# Patient Record
Sex: MALE | Race: WHITE | Employment: OTHER | URBAN - METROPOLITAN AREA
[De-identification: names, ages, dates, MRNs, and addresses within clinical notes are randomized per-mention and may not be internally consistent; named-entity substitution may affect disease eponyms.]

---

## 2017-03-31 ENCOUNTER — OFFICE VISIT (OUTPATIENT)
Dept: FAMILY MEDICINE CLINIC | Age: 72
End: 2017-03-31

## 2017-03-31 VITALS
BODY MASS INDEX: 29.29 KG/M2 | TEMPERATURE: 98.3 F | RESPIRATION RATE: 20 BRPM | HEART RATE: 73 BPM | SYSTOLIC BLOOD PRESSURE: 116 MMHG | OXYGEN SATURATION: 96 % | WEIGHT: 221 LBS | DIASTOLIC BLOOD PRESSURE: 80 MMHG | HEIGHT: 73 IN

## 2017-03-31 DIAGNOSIS — M06.9 RHEUMATOID ARTHRITIS, INVOLVING UNSPECIFIED SITE, UNSPECIFIED RHEUMATOID FACTOR PRESENCE: ICD-10-CM

## 2017-03-31 DIAGNOSIS — I73.9 PVD (PERIPHERAL VASCULAR DISEASE) (HCC): ICD-10-CM

## 2017-03-31 DIAGNOSIS — J40 BRONCHITIS: Primary | ICD-10-CM

## 2017-03-31 PROCEDURE — G8420 CALC BMI NORM PARAMETERS: HCPCS | Performed by: FAMILY MEDICINE

## 2017-03-31 PROCEDURE — 4040F PNEUMOC VAC/ADMIN/RCVD: CPT | Performed by: FAMILY MEDICINE

## 2017-03-31 PROCEDURE — 3017F COLORECTAL CA SCREEN DOC REV: CPT | Performed by: FAMILY MEDICINE

## 2017-03-31 PROCEDURE — 1036F TOBACCO NON-USER: CPT | Performed by: FAMILY MEDICINE

## 2017-03-31 PROCEDURE — 1123F ACP DISCUSS/DSCN MKR DOCD: CPT | Performed by: FAMILY MEDICINE

## 2017-03-31 PROCEDURE — G8598 ASA/ANTIPLAT THER USED: HCPCS | Performed by: FAMILY MEDICINE

## 2017-03-31 PROCEDURE — 99213 OFFICE O/P EST LOW 20 MIN: CPT | Performed by: FAMILY MEDICINE

## 2017-03-31 PROCEDURE — G8484 FLU IMMUNIZE NO ADMIN: HCPCS | Performed by: FAMILY MEDICINE

## 2017-03-31 PROCEDURE — G8427 DOCREV CUR MEDS BY ELIG CLIN: HCPCS | Performed by: FAMILY MEDICINE

## 2017-03-31 RX ORDER — CEFDINIR 300 MG/1
300 CAPSULE ORAL 2 TIMES DAILY
Qty: 20 CAPSULE | Refills: 0 | Status: SHIPPED | OUTPATIENT
Start: 2017-03-31 | End: 2017-04-10

## 2017-03-31 RX ORDER — EVOLOCUMAB 140 MG/ML
INJECTION, SOLUTION SUBCUTANEOUS
Status: ON HOLD | COMMUNITY
Start: 2017-03-30 | End: 2018-01-11

## 2017-03-31 RX ORDER — LISINOPRIL AND HYDROCHLOROTHIAZIDE 20; 12.5 MG/1; MG/1
1 TABLET ORAL DAILY
COMMUNITY
Start: 2017-01-04 | End: 2019-02-22

## 2017-03-31 ASSESSMENT — ENCOUNTER SYMPTOMS
RHINORRHEA: 1
COUGH: 1
ALLERGIC/IMMUNOLOGIC NEGATIVE: 1
SINUS PRESSURE: 1
SHORTNESS OF BREATH: 0
GASTROINTESTINAL NEGATIVE: 1
EYES NEGATIVE: 1

## 2017-05-27 ENCOUNTER — OFFICE VISIT (OUTPATIENT)
Dept: FAMILY MEDICINE CLINIC | Age: 72
End: 2017-05-27

## 2017-05-27 VITALS
HEIGHT: 74 IN | RESPIRATION RATE: 15 BRPM | DIASTOLIC BLOOD PRESSURE: 70 MMHG | TEMPERATURE: 97.4 F | BODY MASS INDEX: 28.11 KG/M2 | SYSTOLIC BLOOD PRESSURE: 128 MMHG | HEART RATE: 78 BPM | WEIGHT: 219 LBS

## 2017-05-27 DIAGNOSIS — J20.9 ACUTE BRONCHITIS, UNSPECIFIED ORGANISM: ICD-10-CM

## 2017-05-27 DIAGNOSIS — J01.90 ACUTE NON-RECURRENT SINUSITIS, UNSPECIFIED LOCATION: Primary | ICD-10-CM

## 2017-05-27 PROCEDURE — G8427 DOCREV CUR MEDS BY ELIG CLIN: HCPCS | Performed by: FAMILY MEDICINE

## 2017-05-27 PROCEDURE — 1123F ACP DISCUSS/DSCN MKR DOCD: CPT | Performed by: FAMILY MEDICINE

## 2017-05-27 PROCEDURE — 99213 OFFICE O/P EST LOW 20 MIN: CPT | Performed by: FAMILY MEDICINE

## 2017-05-27 PROCEDURE — 4040F PNEUMOC VAC/ADMIN/RCVD: CPT | Performed by: FAMILY MEDICINE

## 2017-05-27 PROCEDURE — 3017F COLORECTAL CA SCREEN DOC REV: CPT | Performed by: FAMILY MEDICINE

## 2017-05-27 PROCEDURE — 1036F TOBACCO NON-USER: CPT | Performed by: FAMILY MEDICINE

## 2017-05-27 PROCEDURE — G8598 ASA/ANTIPLAT THER USED: HCPCS | Performed by: FAMILY MEDICINE

## 2017-05-27 PROCEDURE — G8420 CALC BMI NORM PARAMETERS: HCPCS | Performed by: FAMILY MEDICINE

## 2017-05-27 RX ORDER — CLARITHROMYCIN 500 MG/1
500 TABLET, COATED ORAL 2 TIMES DAILY
Qty: 20 TABLET | Refills: 0 | Status: SHIPPED | OUTPATIENT
Start: 2017-05-27 | End: 2017-06-06

## 2017-05-27 RX ORDER — ALBUTEROL SULFATE 90 UG/1
2 AEROSOL, METERED RESPIRATORY (INHALATION) EVERY 6 HOURS PRN
Qty: 1 INHALER | Refills: 0 | Status: SHIPPED | OUTPATIENT
Start: 2017-05-27 | End: 2018-04-21 | Stop reason: ALTCHOICE

## 2017-05-27 RX ORDER — PREDNISONE 10 MG/1
30 TABLET ORAL DAILY
Qty: 30 TABLET | Refills: 0 | Status: SHIPPED | OUTPATIENT
Start: 2017-05-27 | End: 2017-06-06

## 2017-05-27 ASSESSMENT — ENCOUNTER SYMPTOMS
SHORTNESS OF BREATH: 0
SORE THROAT: 0
COUGH: 1
HEMOPTYSIS: 0
RHINORRHEA: 1
GASTROINTESTINAL NEGATIVE: 1
WHEEZING: 1
HEARTBURN: 0

## 2017-06-20 ENCOUNTER — OFFICE VISIT (OUTPATIENT)
Dept: FAMILY MEDICINE CLINIC | Age: 72
End: 2017-06-20

## 2017-06-20 VITALS
BODY MASS INDEX: 28.11 KG/M2 | WEIGHT: 219 LBS | HEIGHT: 74 IN | SYSTOLIC BLOOD PRESSURE: 128 MMHG | DIASTOLIC BLOOD PRESSURE: 80 MMHG | HEART RATE: 74 BPM | RESPIRATION RATE: 16 BRPM | TEMPERATURE: 98.3 F | OXYGEN SATURATION: 96 %

## 2017-06-20 DIAGNOSIS — R55 SYNCOPE, UNSPECIFIED SYNCOPE TYPE: ICD-10-CM

## 2017-06-20 DIAGNOSIS — R05.9 COUGH: Primary | ICD-10-CM

## 2017-06-20 DIAGNOSIS — R53.82 CHRONIC FATIGUE: ICD-10-CM

## 2017-06-20 DIAGNOSIS — R05.9 COUGH: ICD-10-CM

## 2017-06-20 DIAGNOSIS — J44.1 COPD EXACERBATION (HCC): ICD-10-CM

## 2017-06-20 LAB
ALBUMIN SERPL-MCNC: 3.9 G/DL (ref 3.9–4.9)
ALP BLD-CCNC: 48 U/L (ref 35–104)
ALT SERPL-CCNC: 19 U/L (ref 0–41)
ANION GAP SERPL CALCULATED.3IONS-SCNC: 12 MEQ/L (ref 7–13)
AST SERPL-CCNC: 17 U/L (ref 0–40)
BASOPHILS ABSOLUTE: 0.1 K/UL (ref 0–0.2)
BASOPHILS RELATIVE PERCENT: 1.2 %
BILIRUB SERPL-MCNC: 0.3 MG/DL (ref 0–1.2)
BUN BLDV-MCNC: 25 MG/DL (ref 8–23)
C-REACTIVE PROTEIN: 15.2 MG/L (ref 0–5)
CALCIUM SERPL-MCNC: 9.3 MG/DL (ref 8.6–10.2)
CHLORIDE BLD-SCNC: 105 MEQ/L (ref 98–107)
CO2: 24 MEQ/L (ref 22–29)
CREAT SERPL-MCNC: 1.11 MG/DL (ref 0.7–1.2)
EOSINOPHILS ABSOLUTE: 1 K/UL (ref 0–0.7)
EOSINOPHILS RELATIVE PERCENT: 9.7 %
GFR AFRICAN AMERICAN: >60
GFR NON-AFRICAN AMERICAN: >60
GLOBULIN: 2.5 G/DL (ref 2.3–3.5)
GLUCOSE BLD-MCNC: 104 MG/DL (ref 74–109)
HCT VFR BLD CALC: 46.4 % (ref 42–52)
HEMOGLOBIN: 15.4 G/DL (ref 14–18)
LYMPHOCYTES ABSOLUTE: 1.9 K/UL (ref 1–4.8)
LYMPHOCYTES RELATIVE PERCENT: 19.2 %
MCH RBC QN AUTO: 31.8 PG (ref 27–31.3)
MCHC RBC AUTO-ENTMCNC: 33.1 % (ref 33–37)
MCV RBC AUTO: 96 FL (ref 80–100)
MONOCYTES ABSOLUTE: 0.7 K/UL (ref 0.2–0.8)
MONOCYTES RELATIVE PERCENT: 6.9 %
NEUTROPHILS ABSOLUTE: 6.3 K/UL (ref 1.4–6.5)
NEUTROPHILS RELATIVE PERCENT: 63 %
PDW BLD-RTO: 14.1 % (ref 11.5–14.5)
PLATELET # BLD: 226 K/UL (ref 130–400)
POTASSIUM SERPL-SCNC: 4.2 MEQ/L (ref 3.5–5.1)
RBC # BLD: 4.83 M/UL (ref 4.7–6.1)
SEDIMENTATION RATE, ERYTHROCYTE: 21 MM (ref 0–20)
SODIUM BLD-SCNC: 141 MEQ/L (ref 132–144)
TOTAL PROTEIN: 6.4 G/DL (ref 6.4–8.1)
WBC # BLD: 9.9 K/UL (ref 4.8–10.8)

## 2017-06-20 PROCEDURE — G8926 SPIRO NO PERF OR DOC: HCPCS | Performed by: FAMILY MEDICINE

## 2017-06-20 PROCEDURE — 1123F ACP DISCUSS/DSCN MKR DOCD: CPT | Performed by: FAMILY MEDICINE

## 2017-06-20 PROCEDURE — 4040F PNEUMOC VAC/ADMIN/RCVD: CPT | Performed by: FAMILY MEDICINE

## 2017-06-20 PROCEDURE — G8419 CALC BMI OUT NRM PARAM NOF/U: HCPCS | Performed by: FAMILY MEDICINE

## 2017-06-20 PROCEDURE — G8598 ASA/ANTIPLAT THER USED: HCPCS | Performed by: FAMILY MEDICINE

## 2017-06-20 PROCEDURE — 3023F SPIROM DOC REV: CPT | Performed by: FAMILY MEDICINE

## 2017-06-20 PROCEDURE — G8427 DOCREV CUR MEDS BY ELIG CLIN: HCPCS | Performed by: FAMILY MEDICINE

## 2017-06-20 PROCEDURE — 1036F TOBACCO NON-USER: CPT | Performed by: FAMILY MEDICINE

## 2017-06-20 PROCEDURE — 99213 OFFICE O/P EST LOW 20 MIN: CPT | Performed by: FAMILY MEDICINE

## 2017-06-20 PROCEDURE — 3017F COLORECTAL CA SCREEN DOC REV: CPT | Performed by: FAMILY MEDICINE

## 2017-06-20 PROCEDURE — 94060 EVALUATION OF WHEEZING: CPT | Performed by: FAMILY MEDICINE

## 2017-06-20 RX ORDER — ALBUTEROL SULFATE 2.5 MG/3ML
2.5 SOLUTION RESPIRATORY (INHALATION) ONCE
Status: SHIPPED | OUTPATIENT
Start: 2017-06-20

## 2017-06-20 ASSESSMENT — ENCOUNTER SYMPTOMS
VOMITING: 0
NAUSEA: 0
SHORTNESS OF BREATH: 0
VISUAL CHANGE: 0
ABDOMINAL PAIN: 0
RHINORRHEA: 1
COUGH: 1
CHANGE IN BOWEL HABIT: 0
SWOLLEN GLANDS: 0
WHEEZING: 1
GASTROINTESTINAL NEGATIVE: 1
SORE THROAT: 0

## 2017-06-20 ASSESSMENT — PATIENT HEALTH QUESTIONNAIRE - PHQ9
1. LITTLE INTEREST OR PLEASURE IN DOING THINGS: 0
2. FEELING DOWN, DEPRESSED OR HOPELESS: 0
SUM OF ALL RESPONSES TO PHQ QUESTIONS 1-9: 0
SUM OF ALL RESPONSES TO PHQ9 QUESTIONS 1 & 2: 0

## 2017-06-28 DIAGNOSIS — R05.9 COUGH: Primary | ICD-10-CM

## 2017-06-28 DIAGNOSIS — R05.9 COUGH: ICD-10-CM

## 2017-07-02 LAB
2000687N OAK TREE IGE: <0.1 KU/L
ALLERGEN ASPERGILLUS ALTERNATA IGE: <0.1 KU/L
ALLERGEN ASPERGILLUS FUMIGATUS IGE: <0.1 KU/L
ALLERGEN BERMUDA GRASS IGE: <0.1 KU/L
ALLERGEN BIRCH IGE: <0.1 KU/L
ALLERGEN CAT DANDER IGE: <0.1 KU/L
ALLERGEN COMMON SHORT RAGWEED IGE: <0.1 KU/L
ALLERGEN COTTONWOOD: <0.1 KU/L
ALLERGEN COW MILK IGE: <0.1 KU/L
ALLERGEN DOG DANDER IGE: <0.1 KU/L
ALLERGEN ELM IGE: <0.1 KU/L
ALLERGEN FUNGI/MOLD M.RACEMOSUS IGE: <0.1 KU/L
ALLERGEN GERMAN COCKROACH IGE: <0.1 KU/L
ALLERGEN HORMODENDRUM HORDEI IGE: <0.1 KU/L
ALLERGEN MAPLE/BOX ELDER IGE: <0.1 KU/L
ALLERGEN MITE DUST FARINAE IGE: <0.1 KU/L
ALLERGEN MITE DUST PTERONYSSINUS IGE: <0.1 KU/L
ALLERGEN MOUNTAIN CEDAR: <0.1 KU/L
ALLERGEN MOUSE EPITHELIA IGE: <0.1 KU/L
ALLERGEN PEANUT (F13) IGE: <0.1 KU/L
ALLERGEN PECAN TREE IGE: <0.1 KU/L
ALLERGEN PENICILLIUM NOTATUM: <0.1 KU/L
ALLERGEN ROUGH PIGWEED (W14) IGE: <0.1 KU/L
ALLERGEN RUSSIAN THISTLE IGE: <0.1 KU/L
ALLERGEN SEE NOTE: NORMAL
ALLERGEN SHEEP SORREL (W18) IGE: <0.1 KU/L
ALLERGEN TIMOTHY GRASS: <0.1 KU/L
ALLERGEN TREE SYCAMORE: <0.1 KU/L
ALLERGEN WALNUT TREE IGE: <0.1 KU/L
ALLERGEN WHITE MULBERRY TREE, IGE: <0.1 KU/L
ALLERGEN, TREE, WHITE ASH IGE: <0.1 KU/L
IGE: 21 KU/L

## 2018-01-03 ENCOUNTER — HOSPITAL ENCOUNTER (OUTPATIENT)
Dept: PREADMISSION TESTING | Age: 73
Discharge: HOME | End: 2018-01-03
Payer: COMMERCIAL

## 2018-01-03 VITALS
HEIGHT: 74 IN | SYSTOLIC BLOOD PRESSURE: 129 MMHG | RESPIRATION RATE: 16 BRPM | WEIGHT: 219.4 LBS | TEMPERATURE: 96.7 F | DIASTOLIC BLOOD PRESSURE: 66 MMHG | HEART RATE: 64 BPM | BODY MASS INDEX: 28.16 KG/M2 | OXYGEN SATURATION: 95 %

## 2018-01-03 DIAGNOSIS — Z87.891 FORMER SMOKER, STOPPED SMOKING IN DISTANT PAST: Chronic | ICD-10-CM

## 2018-01-03 DIAGNOSIS — G56.01 CARPAL TUNNEL SYNDROME OF RIGHT WRIST: ICD-10-CM

## 2018-01-03 DIAGNOSIS — G25.81 RLS (RESTLESS LEGS SYNDROME): Chronic | ICD-10-CM

## 2018-01-03 LAB
ANION GAP SERPL CALCULATED.3IONS-SCNC: 14 MEQ/L (ref 7–13)
BUN BLDV-MCNC: 28 MG/DL (ref 8–23)
CALCIUM SERPL-MCNC: 9.4 MG/DL (ref 8.6–10.2)
CHLORIDE BLD-SCNC: 104 MEQ/L (ref 98–107)
CO2: 26 MEQ/L (ref 22–29)
CREAT SERPL-MCNC: 1.33 MG/DL (ref 0.7–1.2)
EKG ATRIAL RATE: 56 BPM
EKG P AXIS: 37 DEGREES
EKG P-R INTERVAL: 156 MS
EKG Q-T INTERVAL: 450 MS
EKG QRS DURATION: 148 MS
EKG QTC CALCULATION (BAZETT): 434 MS
EKG R AXIS: 95 DEGREES
EKG T AXIS: 55 DEGREES
EKG VENTRICULAR RATE: 56 BPM
GFR AFRICAN AMERICAN: >60
GFR NON-AFRICAN AMERICAN: 52.5
GLUCOSE BLD-MCNC: 100 MG/DL (ref 74–109)
HCT VFR BLD CALC: 44.1 % (ref 42–52)
HEMOGLOBIN: 14.8 G/DL (ref 14–18)
MCH RBC QN AUTO: 32 PG (ref 27–31.3)
MCHC RBC AUTO-ENTMCNC: 33.6 % (ref 33–37)
MCV RBC AUTO: 95.4 FL (ref 80–100)
PDW BLD-RTO: 14.3 % (ref 11.5–14.5)
PLATELET # BLD: 265 K/UL (ref 130–400)
POTASSIUM SERPL-SCNC: 4.4 MEQ/L (ref 3.5–5.1)
RBC # BLD: 4.63 M/UL (ref 4.7–6.1)
SODIUM BLD-SCNC: 144 MEQ/L (ref 132–144)
WBC # BLD: 10.6 K/UL (ref 4.8–10.8)

## 2018-01-03 PROCEDURE — 80048 BASIC METABOLIC PNL TOTAL CA: CPT

## 2018-01-03 PROCEDURE — 85027 COMPLETE CBC AUTOMATED: CPT

## 2018-01-03 PROCEDURE — 93005 ELECTROCARDIOGRAM TRACING: CPT

## 2018-01-03 RX ORDER — SODIUM CHLORIDE 0.9 % (FLUSH) 0.9 %
10 SYRINGE (ML) INJECTION PRN
Status: CANCELLED | OUTPATIENT
Start: 2018-01-03

## 2018-01-03 RX ORDER — IPRATROPIUM BROMIDE AND ALBUTEROL SULFATE 2.5; .5 MG/3ML; MG/3ML
1 SOLUTION RESPIRATORY (INHALATION) EVERY 4 HOURS
COMMUNITY

## 2018-01-03 RX ORDER — ACETAMINOPHEN 500 MG
500 TABLET ORAL EVERY 6 HOURS PRN
COMMUNITY
End: 2019-07-25 | Stop reason: ALTCHOICE

## 2018-01-03 RX ORDER — GABAPENTIN 600 MG/1
600 TABLET ORAL NIGHTLY
COMMUNITY
End: 2019-02-22

## 2018-01-03 RX ORDER — MONTELUKAST SODIUM 10 MG/1
10 TABLET ORAL NIGHTLY
COMMUNITY
End: 2018-04-21 | Stop reason: ALTCHOICE

## 2018-01-03 RX ORDER — SODIUM CHLORIDE 0.9 % (FLUSH) 0.9 %
10 SYRINGE (ML) INJECTION EVERY 12 HOURS SCHEDULED
Status: CANCELLED | OUTPATIENT
Start: 2018-01-03

## 2018-01-03 RX ORDER — LIDOCAINE HYDROCHLORIDE 10 MG/ML
1 INJECTION, SOLUTION EPIDURAL; INFILTRATION; INTRACAUDAL; PERINEURAL
Status: CANCELLED | OUTPATIENT
Start: 2018-01-03 | End: 2018-01-03

## 2018-01-03 RX ORDER — SODIUM CHLORIDE, SODIUM LACTATE, POTASSIUM CHLORIDE, CALCIUM CHLORIDE 600; 310; 30; 20 MG/100ML; MG/100ML; MG/100ML; MG/100ML
INJECTION, SOLUTION INTRAVENOUS CONTINUOUS
Status: CANCELLED | OUTPATIENT
Start: 2018-01-03

## 2018-01-03 ASSESSMENT — ENCOUNTER SYMPTOMS
WHEEZING: 0
SORE THROAT: 0
DIARRHEA: 0
EYES NEGATIVE: 1
CONSTIPATION: 0
HEARTBURN: 0
VOMITING: 0
BACK PAIN: 0
COUGH: 0
NAUSEA: 0
SHORTNESS OF BREATH: 0
STRIDOR: 0
ABDOMINAL PAIN: 0

## 2018-01-11 ENCOUNTER — HOSPITAL ENCOUNTER (OUTPATIENT)
Age: 73
Setting detail: OUTPATIENT SURGERY
Discharge: HOME | End: 2018-01-11
Attending: ORTHOPAEDIC SURGERY | Admitting: ORTHOPAEDIC SURGERY
Payer: COMMERCIAL

## 2018-01-11 ENCOUNTER — ANESTHESIA EVENT (OUTPATIENT)
Dept: OPERATING ROOM | Age: 73
End: 2018-01-11
Payer: COMMERCIAL

## 2018-01-11 ENCOUNTER — ANESTHESIA (OUTPATIENT)
Dept: OPERATING ROOM | Age: 73
End: 2018-01-11
Payer: COMMERCIAL

## 2018-01-11 VITALS
RESPIRATION RATE: 20 BRPM | TEMPERATURE: 97 F | SYSTOLIC BLOOD PRESSURE: 125 MMHG | DIASTOLIC BLOOD PRESSURE: 78 MMHG | HEART RATE: 63 BPM | OXYGEN SATURATION: 96 %

## 2018-01-11 VITALS
SYSTOLIC BLOOD PRESSURE: 107 MMHG | DIASTOLIC BLOOD PRESSURE: 58 MMHG | OXYGEN SATURATION: 95 % | RESPIRATION RATE: 11 BRPM

## 2018-01-11 PROCEDURE — 2580000003 HC RX 258: Performed by: ANESTHESIOLOGY

## 2018-01-11 PROCEDURE — 2500000003 HC RX 250 WO HCPCS: Performed by: ANESTHESIOLOGY

## 2018-01-11 PROCEDURE — 6360000002 HC RX W HCPCS: Performed by: NURSE PRACTITIONER

## 2018-01-11 PROCEDURE — 2500000003 HC RX 250 WO HCPCS: Performed by: STUDENT IN AN ORGANIZED HEALTH CARE EDUCATION/TRAINING PROGRAM

## 2018-01-11 PROCEDURE — 3600000013 HC SURGERY LEVEL 3 ADDTL 15MIN: Performed by: ORTHOPAEDIC SURGERY

## 2018-01-11 PROCEDURE — 3700000001 HC ADD 15 MINUTES (ANESTHESIA): Performed by: ORTHOPAEDIC SURGERY

## 2018-01-11 PROCEDURE — 2500000003 HC RX 250 WO HCPCS: Performed by: ORTHOPAEDIC SURGERY

## 2018-01-11 PROCEDURE — 7100000000 HC PACU RECOVERY - FIRST 15 MIN: Performed by: ORTHOPAEDIC SURGERY

## 2018-01-11 PROCEDURE — 3600000003 HC SURGERY LEVEL 3 BASE: Performed by: ORTHOPAEDIC SURGERY

## 2018-01-11 PROCEDURE — 2580000003 HC RX 258: Performed by: ORTHOPAEDIC SURGERY

## 2018-01-11 PROCEDURE — 6360000002 HC RX W HCPCS: Performed by: ANESTHESIOLOGY

## 2018-01-11 PROCEDURE — 7100000011 HC PHASE II RECOVERY - ADDTL 15 MIN: Performed by: ORTHOPAEDIC SURGERY

## 2018-01-11 PROCEDURE — 7100000010 HC PHASE II RECOVERY - FIRST 15 MIN: Performed by: ORTHOPAEDIC SURGERY

## 2018-01-11 PROCEDURE — 2500000003 HC RX 250 WO HCPCS: Performed by: NURSE ANESTHETIST, CERTIFIED REGISTERED

## 2018-01-11 PROCEDURE — 6360000002 HC RX W HCPCS: Performed by: NURSE ANESTHETIST, CERTIFIED REGISTERED

## 2018-01-11 PROCEDURE — 3700000000 HC ANESTHESIA ATTENDED CARE: Performed by: ORTHOPAEDIC SURGERY

## 2018-01-11 PROCEDURE — 6370000000 HC RX 637 (ALT 250 FOR IP): Performed by: ORTHOPAEDIC SURGERY

## 2018-01-11 PROCEDURE — 7100000001 HC PACU RECOVERY - ADDTL 15 MIN: Performed by: ORTHOPAEDIC SURGERY

## 2018-01-11 RX ORDER — SODIUM CHLORIDE 0.9 % (FLUSH) 0.9 %
10 SYRINGE (ML) INJECTION PRN
Status: DISCONTINUED | OUTPATIENT
Start: 2018-01-11 | End: 2018-01-11 | Stop reason: HOSPADM

## 2018-01-11 RX ORDER — OXYCODONE HYDROCHLORIDE AND ACETAMINOPHEN 5; 325 MG/1; MG/1
1 TABLET ORAL EVERY 4 HOURS PRN
Status: DISCONTINUED | OUTPATIENT
Start: 2018-01-11 | End: 2018-01-11 | Stop reason: HOSPADM

## 2018-01-11 RX ORDER — PROPOFOL 10 MG/ML
INJECTION, EMULSION INTRAVENOUS CONTINUOUS PRN
Status: DISCONTINUED | OUTPATIENT
Start: 2018-01-11 | End: 2018-01-11 | Stop reason: SDUPTHER

## 2018-01-11 RX ORDER — ACETAMINOPHEN 325 MG/1
650 TABLET ORAL EVERY 4 HOURS PRN
Status: DISCONTINUED | OUTPATIENT
Start: 2018-01-11 | End: 2018-01-11 | Stop reason: HOSPADM

## 2018-01-11 RX ORDER — HYDROCODONE BITARTRATE AND ACETAMINOPHEN 5; 325 MG/1; MG/1
2 TABLET ORAL PRN
Status: DISCONTINUED | OUTPATIENT
Start: 2018-01-11 | End: 2018-01-11 | Stop reason: HOSPADM

## 2018-01-11 RX ORDER — SODIUM CHLORIDE 0.9 % (FLUSH) 0.9 %
10 SYRINGE (ML) INJECTION EVERY 12 HOURS SCHEDULED
Status: DISCONTINUED | OUTPATIENT
Start: 2018-01-11 | End: 2018-01-11 | Stop reason: HOSPADM

## 2018-01-11 RX ORDER — FENTANYL CITRATE 50 UG/ML
INJECTION, SOLUTION INTRAMUSCULAR; INTRAVENOUS PRN
Status: DISCONTINUED | OUTPATIENT
Start: 2018-01-11 | End: 2018-01-11 | Stop reason: SDUPTHER

## 2018-01-11 RX ORDER — HYDROCODONE BITARTRATE AND ACETAMINOPHEN 5; 325 MG/1; MG/1
1 TABLET ORAL PRN
Status: DISCONTINUED | OUTPATIENT
Start: 2018-01-11 | End: 2018-01-11 | Stop reason: HOSPADM

## 2018-01-11 RX ORDER — GINSENG 100 MG
CAPSULE ORAL PRN
Status: DISCONTINUED | OUTPATIENT
Start: 2018-01-11 | End: 2018-01-11 | Stop reason: HOSPADM

## 2018-01-11 RX ORDER — METOCLOPRAMIDE HYDROCHLORIDE 5 MG/ML
10 INJECTION INTRAMUSCULAR; INTRAVENOUS
Status: DISCONTINUED | OUTPATIENT
Start: 2018-01-11 | End: 2018-01-11 | Stop reason: HOSPADM

## 2018-01-11 RX ORDER — ONDANSETRON 2 MG/ML
4 INJECTION INTRAMUSCULAR; INTRAVENOUS
Status: DISCONTINUED | OUTPATIENT
Start: 2018-01-11 | End: 2018-01-11 | Stop reason: HOSPADM

## 2018-01-11 RX ORDER — LIDOCAINE HYDROCHLORIDE 10 MG/ML
1 INJECTION, SOLUTION EPIDURAL; INFILTRATION; INTRACAUDAL; PERINEURAL
Status: COMPLETED | OUTPATIENT
Start: 2018-01-11 | End: 2018-01-11

## 2018-01-11 RX ORDER — ONDANSETRON 2 MG/ML
4 INJECTION INTRAMUSCULAR; INTRAVENOUS EVERY 6 HOURS PRN
Status: DISCONTINUED | OUTPATIENT
Start: 2018-01-11 | End: 2018-01-11 | Stop reason: HOSPADM

## 2018-01-11 RX ORDER — MORPHINE SULFATE 4 MG/ML
4 INJECTION, SOLUTION INTRAMUSCULAR; INTRAVENOUS
Status: DISCONTINUED | OUTPATIENT
Start: 2018-01-11 | End: 2018-01-11 | Stop reason: HOSPADM

## 2018-01-11 RX ORDER — SODIUM CHLORIDE, SODIUM LACTATE, POTASSIUM CHLORIDE, CALCIUM CHLORIDE 600; 310; 30; 20 MG/100ML; MG/100ML; MG/100ML; MG/100ML
INJECTION, SOLUTION INTRAVENOUS CONTINUOUS PRN
Status: DISCONTINUED | OUTPATIENT
Start: 2018-01-11 | End: 2018-01-11 | Stop reason: SDUPTHER

## 2018-01-11 RX ORDER — LIDOCAINE HYDROCHLORIDE 20 MG/ML
INJECTION, SOLUTION INFILTRATION; PERINEURAL PRN
Status: DISCONTINUED | OUTPATIENT
Start: 2018-01-11 | End: 2018-01-11 | Stop reason: SDUPTHER

## 2018-01-11 RX ORDER — MAGNESIUM HYDROXIDE 1200 MG/15ML
LIQUID ORAL CONTINUOUS PRN
Status: DISCONTINUED | OUTPATIENT
Start: 2018-01-11 | End: 2018-01-11 | Stop reason: HOSPADM

## 2018-01-11 RX ORDER — OXYCODONE HYDROCHLORIDE AND ACETAMINOPHEN 5; 325 MG/1; MG/1
2 TABLET ORAL EVERY 4 HOURS PRN
Status: DISCONTINUED | OUTPATIENT
Start: 2018-01-11 | End: 2018-01-11 | Stop reason: HOSPADM

## 2018-01-11 RX ORDER — SODIUM CHLORIDE 9 MG/ML
50 INJECTION, SOLUTION INTRAVENOUS CONTINUOUS
Status: ACTIVE | OUTPATIENT
Start: 2018-01-11 | End: 2018-01-11

## 2018-01-11 RX ORDER — FENTANYL CITRATE 50 UG/ML
50 INJECTION, SOLUTION INTRAMUSCULAR; INTRAVENOUS EVERY 10 MIN PRN
Status: DISCONTINUED | OUTPATIENT
Start: 2018-01-11 | End: 2018-01-11 | Stop reason: HOSPADM

## 2018-01-11 RX ORDER — LIDOCAINE HYDROCHLORIDE 5 MG/ML
INJECTION, SOLUTION INFILTRATION; INTRAVENOUS PRN
Status: DISCONTINUED | OUTPATIENT
Start: 2018-01-11 | End: 2018-01-11 | Stop reason: SDUPTHER

## 2018-01-11 RX ORDER — MEPERIDINE HYDROCHLORIDE 25 MG/ML
12.5 INJECTION INTRAMUSCULAR; INTRAVENOUS; SUBCUTANEOUS EVERY 5 MIN PRN
Status: DISCONTINUED | OUTPATIENT
Start: 2018-01-11 | End: 2018-01-11 | Stop reason: HOSPADM

## 2018-01-11 RX ORDER — MORPHINE SULFATE 2 MG/ML
2 INJECTION, SOLUTION INTRAMUSCULAR; INTRAVENOUS
Status: DISCONTINUED | OUTPATIENT
Start: 2018-01-11 | End: 2018-01-11 | Stop reason: HOSPADM

## 2018-01-11 RX ORDER — DIPHENHYDRAMINE HYDROCHLORIDE 50 MG/ML
12.5 INJECTION INTRAMUSCULAR; INTRAVENOUS
Status: DISCONTINUED | OUTPATIENT
Start: 2018-01-11 | End: 2018-01-11 | Stop reason: HOSPADM

## 2018-01-11 RX ORDER — SODIUM CHLORIDE, SODIUM LACTATE, POTASSIUM CHLORIDE, CALCIUM CHLORIDE 600; 310; 30; 20 MG/100ML; MG/100ML; MG/100ML; MG/100ML
INJECTION, SOLUTION INTRAVENOUS CONTINUOUS
Status: DISCONTINUED | OUTPATIENT
Start: 2018-01-11 | End: 2018-01-11 | Stop reason: HOSPADM

## 2018-01-11 RX ORDER — BUPIVACAINE HYDROCHLORIDE 5 MG/ML
INJECTION, SOLUTION EPIDURAL; INTRACAUDAL PRN
Status: DISCONTINUED | OUTPATIENT
Start: 2018-01-11 | End: 2018-01-11 | Stop reason: HOSPADM

## 2018-01-11 RX ADMIN — LIDOCAINE HYDROCHLORIDE 0.1 ML: 10 INJECTION, SOLUTION EPIDURAL; INFILTRATION; INTRACAUDAL; PERINEURAL at 11:55

## 2018-01-11 RX ADMIN — CEFAZOLIN SODIUM 2 G: 2 SOLUTION INTRAVENOUS at 12:53

## 2018-01-11 RX ADMIN — FENTANYL CITRATE 35 MCG: 50 INJECTION, SOLUTION INTRAMUSCULAR; INTRAVENOUS at 12:48

## 2018-01-11 RX ADMIN — SODIUM CHLORIDE, POTASSIUM CHLORIDE, SODIUM LACTATE AND CALCIUM CHLORIDE: 600; 310; 30; 20 INJECTION, SOLUTION INTRAVENOUS at 12:44

## 2018-01-11 RX ADMIN — LIDOCAINE HYDROCHLORIDE 175 MG: 5 INJECTION, SOLUTION INFILTRATION at 12:51

## 2018-01-11 RX ADMIN — PROPOFOL 100 MCG/KG/MIN: 10 INJECTION, EMULSION INTRAVENOUS at 12:50

## 2018-01-11 RX ADMIN — LIDOCAINE HYDROCHLORIDE 100 MG: 20 INJECTION, SOLUTION INFILTRATION; PERINEURAL at 12:50

## 2018-01-11 ASSESSMENT — PULMONARY FUNCTION TESTS
PIF_VALUE: 1
PIF_VALUE: 0
PIF_VALUE: 1
PIF_VALUE: 0
PIF_VALUE: 1
PIF_VALUE: 0
PIF_VALUE: 1

## 2018-01-11 ASSESSMENT — COPD QUESTIONNAIRES: CAT_SEVERITY: MILD

## 2018-01-11 ASSESSMENT — PAIN SCALES - GENERAL
PAINLEVEL_OUTOF10: 0
PAINLEVEL_OUTOF10: 0

## 2018-01-11 ASSESSMENT — PAIN - FUNCTIONAL ASSESSMENT: PAIN_FUNCTIONAL_ASSESSMENT: 0-10

## 2018-01-11 NOTE — ANESTHESIA POSTPROCEDURE EVALUATION
Department of Anesthesiology  Postprocedure Note    Patient: Rivka Lee  MRN: [id number]  YOB: 1945  Date of evaluation: 1/11/2018  Time:  1:22 PM     Procedure Summary     Date:  01/11/18 Room / Location:  [address] [place] [facility]    Anesthesia Start:  1244 Anesthesia Stop:  1320    Procedure:  RIGHT WRIST CARPAL TUNNEL RELEASE (Right Wrist) Diagnosis:  (RIGHT WRIST CARPAL TUNNEL SYNDROME)    Surgeon:  Jose David Tyler MD Responsible Provider:  Iram Lao MD    Anesthesia Type:  MAC, Mary block ASA Status:  3          Anesthesia Type: MAC, [place] block    Adrian Phase I:      Adrian Phase II:      Last vitals: Reviewed and per EMR flowsheets.        Anesthesia Post Evaluation    Patient location during evaluation: PACU  Patient participation: complete - patient participated  Level of consciousness: awake and alert  Pain score: 0  Airway patency: patent  Nausea & Vomiting: no vomiting and no nausea  Complications: no  Cardiovascular status: hemodynamically stable  Respiratory status: face mask  Hydration status: stable

## 2018-01-11 NOTE — PROGRESS NOTES
To room 18 accompanied by Judith Jones RN  Head of bed elevated for comfort. No pain. Circulation good to right hand. Right hand is pink, warm, brisk capillary refill. Feels touch, moves fingers as instructed. Taking snack and drink well. Will call for his ride when ready.

## 2018-01-11 NOTE — H&P
History and physical is reviewed, patient re evaluated, no changes. Procedure, risks, benefits, and postoperative course were discussed with the patient and consent is reviewed.   Varsha Carrera MD

## 2018-01-11 NOTE — ANESTHESIA PROCEDURE NOTES
Peripheral Block    Patient location during procedure: OR  Start time: 1/11/2018 12:50 PM  End time: 1/11/2018 12:51 PM  Staffing  Anesthesiologist: Herlinda Thomas  Performed: anesthesiologist   Preanesthetic Checklist  Completed: patient identified, site marked, surgical consent, pre-op evaluation, timeout performed, IV checked, risks and benefits discussed, monitors and equipment checked, anesthesia consent given, oxygen available and patient being monitored  Peripheral Block  Patient position: supine  Prep: ChloraPrep  Patient monitoring: cardiac monitor, continuous pulse ox, frequent blood pressure checks and IV access  Block type: Mary block  Laterality: right  Injection technique: single-shot  Procedures: through iv catheter   Local infiltration: ropivacaine and lidocaine  Infiltration strength: 0.5 %  Dose: 35 mL  Provider prep: mask and sterile gloves  Local infiltration: ropivacaine and lidocaine  Needle  Needle type:  Other   Needle gauge: 22 G  Assessment  Injection assessment: negative aspiration for heme, no paresthesia on injection and local visualized surrounding nerve on ultrasound  Paresthesia pain: immediately resolved  Slow fractionated injection: yes  Hemodynamics: stable  Additional Notes       Reason for block: post-op pain management and at surgeon's request

## 2018-01-11 NOTE — ANESTHESIA PRE PROCEDURE
UNITS TABS Take 5,000 Units by mouth daily. Historical Provider, MD   metoprolol (TOPROL XL) 50 MG XL tablet Take 1 tablet by mouth daily. 4/29/14   Sally Finch MD   methotrexate (RHEUMATREX) 2.5 MG chemo tablet Take 4 tablets by mouth once a week. 10/25/12   Sally Finch MD   aspirin 81 MG tablet Take 81 mg by mouth daily. Historical Provider, MD   magnesium oxide (MAG-OX) 400 MG tablet Take 400 mg by mouth daily. Historical Provider, MD       Current medications:    Current Facility-Administered Medications   Medication Dose Route Frequency Provider Last Rate Last Dose    lactated ringers infusion   Intravenous Continuous Jared Kenny, DO        lidocaine PF 1 % injection 1 mL  1 mL Intradermal Once PRN Sunshine Brooks, DO        sodium chloride flush 0.9 % injection 10 mL  10 mL Intravenous 2 times per day Sunshine Brooks, DO        sodium chloride flush 0.9 % injection 10 mL  10 mL Intravenous PRN Sunshine Brooks, DO        ceFAZolin (ANCEF) 2 g in dextrose 3 % 50 mL IVPB (duplex)  2 g Intravenous Once Dena Emmanuel NP           Allergies:     Allergies   Allergen Reactions    Seasonal      Sinus congestion    Statins      MYALGIAS       Problem List:    Patient Active Problem List   Diagnosis Code    CAD (coronary artery disease) I25.10    Hypertension I10    Eczema L30.9    Rheumatoid arthritis ([facility] M06.9    BPH (benign prostatic hyperplasia) N40.0    Neuropathy (HCC) G62.9    Vitamin D deficiency E55.9    CTS (carpal tunnel syndrome) G56.00    Degenerative arthritis of lumbar spine M47.816    Arthritis of shoulder region M19.019    Trigger index finger of left hand M65.322    Statin intolerance Z78.9    Claudication of gluteal region (HCC) I73.9    CKD (chronic kidney disease) stage 3, GFR 30-59 ml/min N18.3    Chronic back pain M54.9, G89.29    Elevated PSA R97.20    RLS (restless legs syndrome) G25.81    Carpal tunnel syndrome of right wrist

## 2018-01-12 NOTE — OP NOTE
[address]                       [address], [address] [facility]                                 OPERATIVE REPORT    PATIENT NAME: Cuco Pineda                  :        1945  MED REC NO:   [id number]                            ROOM:  ACCOUNT NO:   [de-identified]                           ADMIT DATE: 2018  PROVIDER:     Epifanio Tim MD    DATE OF PROCEDURE:  2018    PREOPERATIVE DIAGNOSIS:  Right carpal tunnel syndrome. POSTOPERATIVE DIAGNOSIS:  Right carpal tunnel syndrome. PROCEDURE PERFORMED:  Right carpal tunnel release. ANESTHESIA:  Mary block. BLOOD LOSS:  Less than 10 mL. PRIMARY SURGEON:  Epifanio Tim MD    ASSISTANT:  Lizzie Plata PA-C was present throughout the entire case. Given  the nature of the disease process and the procedure, a skilled surgical  first assistant was necessary during the case. The assistant was necessary  to hold retractors and manipulate the extremity during the procedure. A  certified scrub tech was at the back table managing the instruments and  supplies for the surgical case. CLINICAL NOTE:  The patient is a 57-year-old with numbness and tingling in  his right hand, refractory to conservative treatment, presents for elective  carpal tunnel release. The procedure, its risks, benefits, treatment  alternatives, and postoperative course were discussed with him. He  understood and wished to proceed. Prior to taking the patient to the  operating room, surgical site was marked. His H and P was reviewed,  updated, and signed. He received appropriate preoperative antibiotics. OPERATION AND FINDINGS:  The patient was taken to the operating room and  placed in the supine position, whereupon adequate [place] block anesthesia was  then obtained. Prepped and draped in the usual sterile manner. Surgical  time-out was performed and the patient received antibiotics.   Exsanguinated  with an Esmarch bandage, tourniquet inflated to 50 mmHg. A linear incision  was made in line with the radial border of the 4th ray through the skin and  subcutaneous tissue using sharp and blunt dissection. Bovie electrocautery  was used for hemostasis. Transverse carpal ligament was then identified. A small nick was placed in the ligament using a 0.062 [place] blade. [place]  elevator was then placed deep to the ligament under direct visualization  without complication. The contents of the carpal tunnel were inspected and  no other abnormalities were noted. The wound was irrigated with copious  amounts of saline irrigation and closed using #5-0 nylon suture for the  skin edges. Skin edges were infiltrated with 0.5% Marcaine. Dressed with  a bulky dressing. Tolerated the procedure well and taken to post-anesthesia care unit in good  condition without complication.         Maryjane Noguera MD    D: 01/12/2018 7:00:14       T: 01/12/2018 8:30:54     [id number]  Job#: [id number]     Doc#: [id number]    CC:

## 2018-04-21 ENCOUNTER — OFFICE VISIT (OUTPATIENT)
Dept: INTERNAL MEDICINE CLINIC | Age: 73
End: 2018-04-21
Payer: COMMERCIAL

## 2018-04-21 VITALS
RESPIRATION RATE: 18 BRPM | TEMPERATURE: 98.2 F | OXYGEN SATURATION: 97 % | SYSTOLIC BLOOD PRESSURE: 104 MMHG | BODY MASS INDEX: 29.13 KG/M2 | HEART RATE: 66 BPM | HEIGHT: 74 IN | WEIGHT: 227 LBS | DIASTOLIC BLOOD PRESSURE: 62 MMHG

## 2018-04-21 DIAGNOSIS — J01.10 ACUTE NON-RECURRENT FRONTAL SINUSITIS: ICD-10-CM

## 2018-04-21 DIAGNOSIS — I73.9 CLAUDICATION OF GLUTEAL REGION (HCC): ICD-10-CM

## 2018-04-21 PROCEDURE — 4040F PNEUMOC VAC/ADMIN/RCVD: CPT | Performed by: FAMILY MEDICINE

## 2018-04-21 PROCEDURE — G8598 ASA/ANTIPLAT THER USED: HCPCS | Performed by: FAMILY MEDICINE

## 2018-04-21 PROCEDURE — 1036F TOBACCO NON-USER: CPT | Performed by: FAMILY MEDICINE

## 2018-04-21 PROCEDURE — G8417 CALC BMI ABV UP PARAM F/U: HCPCS | Performed by: FAMILY MEDICINE

## 2018-04-21 PROCEDURE — 1123F ACP DISCUSS/DSCN MKR DOCD: CPT | Performed by: FAMILY MEDICINE

## 2018-04-21 PROCEDURE — 99213 OFFICE O/P EST LOW 20 MIN: CPT | Performed by: FAMILY MEDICINE

## 2018-04-21 PROCEDURE — 3017F COLORECTAL CA SCREEN DOC REV: CPT | Performed by: FAMILY MEDICINE

## 2018-04-21 PROCEDURE — G8427 DOCREV CUR MEDS BY ELIG CLIN: HCPCS | Performed by: FAMILY MEDICINE

## 2018-04-21 RX ORDER — FOLIC ACID 1 MG/1
1 TABLET ORAL DAILY
COMMUNITY

## 2018-04-21 RX ORDER — GABAPENTIN 300 MG/1
CAPSULE ORAL
COMMUNITY
Start: 2018-04-05 | End: 2018-04-21 | Stop reason: DRUGHIGH

## 2018-04-21 RX ORDER — CEFDINIR 300 MG/1
300 CAPSULE ORAL 2 TIMES DAILY
Qty: 20 CAPSULE | Refills: 0 | Status: SHIPPED | OUTPATIENT
Start: 2018-04-21 | End: 2018-05-01

## 2018-04-21 ASSESSMENT — ENCOUNTER SYMPTOMS
EYES NEGATIVE: 1
HOARSE VOICE: 1
SORE THROAT: 1
SHORTNESS OF BREATH: 0
ALLERGIC/IMMUNOLOGIC NEGATIVE: 1
RESPIRATORY NEGATIVE: 1
SINUS PRESSURE: 1
GASTROINTESTINAL NEGATIVE: 1

## 2018-05-11 ENCOUNTER — OFFICE VISIT (OUTPATIENT)
Dept: INTERNAL MEDICINE CLINIC | Age: 73
End: 2018-05-11
Payer: COMMERCIAL

## 2018-05-11 VITALS
DIASTOLIC BLOOD PRESSURE: 60 MMHG | HEART RATE: 89 BPM | SYSTOLIC BLOOD PRESSURE: 100 MMHG | TEMPERATURE: 98.5 F | HEIGHT: 74 IN | OXYGEN SATURATION: 96 % | BODY MASS INDEX: 29.31 KG/M2 | WEIGHT: 228.4 LBS | RESPIRATION RATE: 16 BRPM

## 2018-05-11 DIAGNOSIS — J40 BRONCHITIS: Primary | ICD-10-CM

## 2018-05-11 PROCEDURE — 99213 OFFICE O/P EST LOW 20 MIN: CPT | Performed by: NURSE PRACTITIONER

## 2018-05-11 PROCEDURE — G8427 DOCREV CUR MEDS BY ELIG CLIN: HCPCS | Performed by: NURSE PRACTITIONER

## 2018-05-11 PROCEDURE — 1036F TOBACCO NON-USER: CPT | Performed by: NURSE PRACTITIONER

## 2018-05-11 PROCEDURE — G8417 CALC BMI ABV UP PARAM F/U: HCPCS | Performed by: NURSE PRACTITIONER

## 2018-05-11 PROCEDURE — 1123F ACP DISCUSS/DSCN MKR DOCD: CPT | Performed by: NURSE PRACTITIONER

## 2018-05-11 PROCEDURE — 3017F COLORECTAL CA SCREEN DOC REV: CPT | Performed by: NURSE PRACTITIONER

## 2018-05-11 PROCEDURE — 4040F PNEUMOC VAC/ADMIN/RCVD: CPT | Performed by: NURSE PRACTITIONER

## 2018-05-11 PROCEDURE — G8598 ASA/ANTIPLAT THER USED: HCPCS | Performed by: NURSE PRACTITIONER

## 2018-05-11 RX ORDER — DOXYCYCLINE HYCLATE 100 MG
100 TABLET ORAL 2 TIMES DAILY
Qty: 14 TABLET | Refills: 0 | Status: SHIPPED | OUTPATIENT
Start: 2018-05-11 | End: 2018-05-18

## 2018-05-11 RX ORDER — PREDNISONE 10 MG/1
TABLET ORAL
Qty: 45 TABLET | Refills: 0 | Status: SHIPPED | OUTPATIENT
Start: 2018-05-11 | End: 2018-06-20 | Stop reason: ALTCHOICE

## 2018-05-14 ASSESSMENT — ENCOUNTER SYMPTOMS
SHORTNESS OF BREATH: 1
DIARRHEA: 0
NAUSEA: 0
VOMITING: 0
SINUS PAIN: 0
TROUBLE SWALLOWING: 0
SORE THROAT: 1
COUGH: 1
WHEEZING: 1
SWOLLEN GLANDS: 0
RHINORRHEA: 1
ABDOMINAL PAIN: 0

## 2018-06-20 ENCOUNTER — OFFICE VISIT (OUTPATIENT)
Dept: INTERNAL MEDICINE CLINIC | Age: 73
End: 2018-06-20
Payer: COMMERCIAL

## 2018-06-20 VITALS
WEIGHT: 224.8 LBS | BODY MASS INDEX: 28.85 KG/M2 | RESPIRATION RATE: 16 BRPM | DIASTOLIC BLOOD PRESSURE: 68 MMHG | OXYGEN SATURATION: 96 % | HEIGHT: 74 IN | SYSTOLIC BLOOD PRESSURE: 128 MMHG | HEART RATE: 66 BPM | TEMPERATURE: 97.7 F

## 2018-06-20 DIAGNOSIS — E55.9 VITAMIN D DEFICIENCY: ICD-10-CM

## 2018-06-20 DIAGNOSIS — M06.9 RHEUMATOID ARTHRITIS, INVOLVING UNSPECIFIED SITE, UNSPECIFIED RHEUMATOID FACTOR PRESENCE: ICD-10-CM

## 2018-06-20 DIAGNOSIS — M25.552 BILATERAL HIP PAIN: ICD-10-CM

## 2018-06-20 DIAGNOSIS — I10 ESSENTIAL HYPERTENSION: Primary | ICD-10-CM

## 2018-06-20 DIAGNOSIS — R73.03 PREDIABETES: ICD-10-CM

## 2018-06-20 DIAGNOSIS — R53.83 FATIGUE, UNSPECIFIED TYPE: ICD-10-CM

## 2018-06-20 DIAGNOSIS — Z23 NEED FOR VACCINATION WITH 13-POLYVALENT PNEUMOCOCCAL CONJUGATE VACCINE: ICD-10-CM

## 2018-06-20 DIAGNOSIS — Z12.11 SCREEN FOR COLON CANCER: ICD-10-CM

## 2018-06-20 DIAGNOSIS — Z13.6 SCREENING FOR AAA (ABDOMINAL AORTIC ANEURYSM): ICD-10-CM

## 2018-06-20 DIAGNOSIS — M25.551 BILATERAL HIP PAIN: ICD-10-CM

## 2018-06-20 LAB
ALBUMIN SERPL-MCNC: 3.9 G/DL
ALP BLD-CCNC: 43 U/L
ALT SERPL-CCNC: 19 U/L
ANION GAP SERPL CALCULATED.3IONS-SCNC: 14 MMOL/L
AST SERPL-CCNC: 21 U/L
BASOPHILS ABSOLUTE: 0.01 /ΜL
BASOPHILS RELATIVE PERCENT: NORMAL %
BILIRUB SERPL-MCNC: 0.3 MG/DL (ref 0.1–1.4)
BUN BLDV-MCNC: 35 MG/DL
CALCIUM SERPL-MCNC: 9.4 MG/DL
CHLORIDE BLD-SCNC: 105 MMOL/L
CO2: NORMAL MMOL/L
CREAT SERPL-MCNC: 1.93 MG/DL
EOSINOPHILS ABSOLUTE: 0.32 /ΜL
EOSINOPHILS RELATIVE PERCENT: NORMAL %
GFR CALCULATED: 34
GLUCOSE BLD-MCNC: 123 MG/DL
HCT VFR BLD CALC: 43.5 % (ref 41–53)
HEMOGLOBIN: 13.8 G/DL (ref 13.5–17.5)
LYMPHOCYTES ABSOLUTE: 1.98 /ΜL
LYMPHOCYTES RELATIVE PERCENT: NORMAL %
MCH RBC QN AUTO: 32.7 PG
MCHC RBC AUTO-ENTMCNC: 31.7 G/DL
MCV RBC AUTO: 103.1 FL
MONOCYTES ABSOLUTE: 0.6 /ΜL
MONOCYTES RELATIVE PERCENT: NORMAL %
NEUTROPHILS ABSOLUTE: 5.59 /ΜL
NEUTROPHILS RELATIVE PERCENT: NORMAL %
PDW BLD-RTO: 55.6 %
PLATELET # BLD: 245 K/ΜL
PMV BLD AUTO: 10.4 FL
POTASSIUM SERPL-SCNC: 4.4 MMOL/L
RBC # BLD: 4.22 10^6/ΜL
SODIUM BLD-SCNC: 143 MMOL/L
TOTAL PROTEIN: 6.6
VITAMIN D 25-HYDROXY: 43
VITAMIN D2, 25 HYDROXY: NORMAL
VITAMIN D3,25 HYDROXY: NORMAL
WBC # BLD: 8.6 10^3/ML

## 2018-06-20 PROCEDURE — 3017F COLORECTAL CA SCREEN DOC REV: CPT | Performed by: NURSE PRACTITIONER

## 2018-06-20 PROCEDURE — 99214 OFFICE O/P EST MOD 30 MIN: CPT | Performed by: NURSE PRACTITIONER

## 2018-06-20 PROCEDURE — 4040F PNEUMOC VAC/ADMIN/RCVD: CPT | Performed by: NURSE PRACTITIONER

## 2018-06-20 PROCEDURE — 1123F ACP DISCUSS/DSCN MKR DOCD: CPT | Performed by: NURSE PRACTITIONER

## 2018-06-20 PROCEDURE — G8598 ASA/ANTIPLAT THER USED: HCPCS | Performed by: NURSE PRACTITIONER

## 2018-06-20 PROCEDURE — G8417 CALC BMI ABV UP PARAM F/U: HCPCS | Performed by: NURSE PRACTITIONER

## 2018-06-20 PROCEDURE — 90471 IMMUNIZATION ADMIN: CPT | Performed by: NURSE PRACTITIONER

## 2018-06-20 PROCEDURE — 90670 PCV13 VACCINE IM: CPT | Performed by: NURSE PRACTITIONER

## 2018-06-20 PROCEDURE — 1036F TOBACCO NON-USER: CPT | Performed by: NURSE PRACTITIONER

## 2018-06-20 PROCEDURE — G8427 DOCREV CUR MEDS BY ELIG CLIN: HCPCS | Performed by: NURSE PRACTITIONER

## 2018-06-20 RX ORDER — CIPROFLOXACIN 500 MG/1
1 TABLET, FILM COATED ORAL DAILY
Refills: 0 | COMMUNITY
Start: 2018-06-06 | End: 2018-06-20

## 2018-06-20 ASSESSMENT — ENCOUNTER SYMPTOMS
CONSTIPATION: 0
DIARRHEA: 0
ABDOMINAL PAIN: 0
ORTHOPNEA: 0
COUGH: 0
WHEEZING: 0
SHORTNESS OF BREATH: 0
VOMITING: 0
CHEST TIGHTNESS: 0
TROUBLE SWALLOWING: 0
BLOOD IN STOOL: 0
PHOTOPHOBIA: 0
NAUSEA: 0
SORE THROAT: 0
BLURRED VISION: 0
COLOR CHANGE: 0

## 2018-06-22 ENCOUNTER — NURSE ONLY (OUTPATIENT)
Dept: INTERNAL MEDICINE CLINIC | Age: 73
End: 2018-06-22
Payer: COMMERCIAL

## 2018-06-22 DIAGNOSIS — Z12.11 SCREEN FOR COLON CANCER: ICD-10-CM

## 2018-06-22 LAB
CONTROL: NORMAL
HEMOCCULT STL QL: NORMAL

## 2018-06-22 PROCEDURE — 82274 ASSAY TEST FOR BLOOD FECAL: CPT | Performed by: NURSE PRACTITIONER

## 2018-06-25 DIAGNOSIS — E55.9 VITAMIN D DEFICIENCY: ICD-10-CM

## 2018-06-25 DIAGNOSIS — R73.03 PREDIABETES: ICD-10-CM

## 2018-06-25 DIAGNOSIS — M25.551 BILATERAL HIP PAIN: Primary | ICD-10-CM

## 2018-06-25 DIAGNOSIS — M25.552 BILATERAL HIP PAIN: Primary | ICD-10-CM

## 2018-06-25 DIAGNOSIS — I10 ESSENTIAL HYPERTENSION: ICD-10-CM

## 2018-06-25 LAB
AVERAGE GLUCOSE: NORMAL
CHOLESTEROL, TOTAL: 196 MG/DL
CHOLESTEROL/HDL RATIO: 5.6
HBA1C MFR BLD: 6.2 %
HDLC SERPL-MCNC: 35 MG/DL (ref 35–70)
LDL CHOLESTEROL CALCULATED: 88 MG/DL (ref 0–160)
TRIGL SERPL-MCNC: 363 MG/DL
VLDLC SERPL CALC-MCNC: 73 MG/DL

## 2018-06-25 RX ORDER — OMEGA-3-ACID ETHYL ESTERS 1 G/1
2 CAPSULE, LIQUID FILLED ORAL 2 TIMES DAILY
Qty: 60 CAPSULE | Refills: 3 | Status: SHIPPED | OUTPATIENT
Start: 2018-06-25 | End: 2020-10-16

## 2018-06-29 ENCOUNTER — HOSPITAL ENCOUNTER (OUTPATIENT)
Dept: ULTRASOUND IMAGING | Age: 73
Discharge: HOME | End: 2018-07-01
Payer: COMMERCIAL

## 2018-06-29 DIAGNOSIS — Z13.6 SCREENING FOR AAA (ABDOMINAL AORTIC ANEURYSM): ICD-10-CM

## 2018-06-29 PROCEDURE — 76706 US ABDL AORTA SCREEN AAA: CPT

## 2018-07-05 ENCOUNTER — TELEPHONE (OUTPATIENT)
Dept: INTERNAL MEDICINE CLINIC | Age: 73
End: 2018-07-05

## 2018-12-09 ENCOUNTER — CARE COORDINATION (OUTPATIENT)
Dept: CARE COORDINATION | Age: 73
End: 2018-12-09

## 2018-12-10 ENCOUNTER — CARE COORDINATION (OUTPATIENT)
Dept: CARE COORDINATION | Age: 73
End: 2018-12-10

## 2018-12-19 ENCOUNTER — CARE COORDINATION (OUTPATIENT)
Dept: CARE COORDINATION | Age: 73
End: 2018-12-19

## 2019-02-22 ENCOUNTER — OFFICE VISIT (OUTPATIENT)
Dept: INTERNAL MEDICINE CLINIC | Age: 74
End: 2019-02-22
Payer: MEDICARE

## 2019-02-22 VITALS
HEIGHT: 74 IN | BODY MASS INDEX: 28.23 KG/M2 | DIASTOLIC BLOOD PRESSURE: 88 MMHG | RESPIRATION RATE: 16 BRPM | OXYGEN SATURATION: 95 % | TEMPERATURE: 98.8 F | HEART RATE: 83 BPM | WEIGHT: 220 LBS | SYSTOLIC BLOOD PRESSURE: 136 MMHG

## 2019-02-22 DIAGNOSIS — I25.10 CORONARY ARTERY DISEASE INVOLVING NATIVE HEART WITHOUT ANGINA PECTORIS, UNSPECIFIED VESSEL OR LESION TYPE: ICD-10-CM

## 2019-02-22 DIAGNOSIS — R73.9 ELEVATED BLOOD SUGAR: ICD-10-CM

## 2019-02-22 DIAGNOSIS — M54.50 CHRONIC BILATERAL LOW BACK PAIN WITHOUT SCIATICA: ICD-10-CM

## 2019-02-22 DIAGNOSIS — M06.9 RHEUMATOID ARTHRITIS, INVOLVING UNSPECIFIED SITE, UNSPECIFIED RHEUMATOID FACTOR PRESENCE: Primary | ICD-10-CM

## 2019-02-22 DIAGNOSIS — N18.30 CKD (CHRONIC KIDNEY DISEASE) STAGE 3, GFR 30-59 ML/MIN (HCC): ICD-10-CM

## 2019-02-22 DIAGNOSIS — I73.9 CLAUDICATION OF GLUTEAL REGION (HCC): ICD-10-CM

## 2019-02-22 DIAGNOSIS — G89.29 CHRONIC BILATERAL LOW BACK PAIN WITHOUT SCIATICA: ICD-10-CM

## 2019-02-22 LAB
ALBUMIN SERPL-MCNC: 4.4 G/DL (ref 3.5–4.6)
ALP BLD-CCNC: 75 U/L (ref 35–104)
ALT SERPL-CCNC: 21 U/L (ref 0–41)
ANION GAP SERPL CALCULATED.3IONS-SCNC: 13 MEQ/L (ref 9–15)
AST SERPL-CCNC: 21 U/L (ref 0–40)
ATYPICAL LYMPHOCYTE RELATIVE PERCENT: 4 %
BASOPHILS ABSOLUTE: 0 K/UL (ref 0–0.2)
BASOPHILS RELATIVE PERCENT: 0.5 %
BILIRUB SERPL-MCNC: 0.3 MG/DL (ref 0.2–0.7)
BUN BLDV-MCNC: 25 MG/DL (ref 8–23)
CALCIUM SERPL-MCNC: 9.5 MG/DL (ref 8.5–9.9)
CHLORIDE BLD-SCNC: 104 MEQ/L (ref 95–107)
CHOLESTEROL, TOTAL: 226 MG/DL (ref 0–199)
CO2: 24 MEQ/L (ref 20–31)
CREAT SERPL-MCNC: 1.3 MG/DL (ref 0.7–1.2)
EOSINOPHILS ABSOLUTE: 0 K/UL (ref 0–0.7)
EOSINOPHILS RELATIVE PERCENT: 0 %
GFR AFRICAN AMERICAN: >60
GFR NON-AFRICAN AMERICAN: 53.7
GLOBULIN: 2.9 G/DL (ref 2.3–3.5)
GLUCOSE BLD-MCNC: 116 MG/DL (ref 70–99)
HBA1C MFR BLD: 6.1 % (ref 4.8–5.9)
HCT VFR BLD CALC: 45.2 % (ref 42–52)
HDLC SERPL-MCNC: 48 MG/DL (ref 40–59)
HEMOGLOBIN: 14.9 G/DL (ref 14–18)
LDL CHOLESTEROL CALCULATED: 160 MG/DL (ref 0–129)
LYMPHOCYTES ABSOLUTE: 2.1 K/UL (ref 1–4.8)
LYMPHOCYTES RELATIVE PERCENT: 12 %
MCH RBC QN AUTO: 32.1 PG (ref 27–31.3)
MCHC RBC AUTO-ENTMCNC: 32.8 % (ref 33–37)
MCV RBC AUTO: 97.8 FL (ref 80–100)
MONOCYTES ABSOLUTE: 0.1 K/UL (ref 0.2–0.8)
MONOCYTES RELATIVE PERCENT: 0.8 %
NEUTROPHILS ABSOLUTE: 10.8 K/UL (ref 1.4–6.5)
NEUTROPHILS RELATIVE PERCENT: 83 %
PDW BLD-RTO: 15.3 % (ref 11.5–14.5)
PLATELET # BLD: 271 K/UL (ref 130–400)
POTASSIUM SERPL-SCNC: 4.5 MEQ/L (ref 3.4–4.9)
RBC # BLD: 4.62 M/UL (ref 4.7–6.1)
SODIUM BLD-SCNC: 141 MEQ/L (ref 135–144)
TOTAL PROTEIN: 7.3 G/DL (ref 6.3–8)
TRIGL SERPL-MCNC: 91 MG/DL (ref 0–150)
WBC # BLD: 13 K/UL (ref 4.8–10.8)

## 2019-02-22 PROCEDURE — G8427 DOCREV CUR MEDS BY ELIG CLIN: HCPCS | Performed by: FAMILY MEDICINE

## 2019-02-22 PROCEDURE — G8598 ASA/ANTIPLAT THER USED: HCPCS | Performed by: FAMILY MEDICINE

## 2019-02-22 PROCEDURE — G8484 FLU IMMUNIZE NO ADMIN: HCPCS | Performed by: FAMILY MEDICINE

## 2019-02-22 PROCEDURE — 1123F ACP DISCUSS/DSCN MKR DOCD: CPT | Performed by: FAMILY MEDICINE

## 2019-02-22 PROCEDURE — 4040F PNEUMOC VAC/ADMIN/RCVD: CPT | Performed by: FAMILY MEDICINE

## 2019-02-22 PROCEDURE — 99213 OFFICE O/P EST LOW 20 MIN: CPT | Performed by: FAMILY MEDICINE

## 2019-02-22 PROCEDURE — 1101F PT FALLS ASSESS-DOCD LE1/YR: CPT | Performed by: FAMILY MEDICINE

## 2019-02-22 PROCEDURE — 1036F TOBACCO NON-USER: CPT | Performed by: FAMILY MEDICINE

## 2019-02-22 PROCEDURE — G8417 CALC BMI ABV UP PARAM F/U: HCPCS | Performed by: FAMILY MEDICINE

## 2019-02-22 PROCEDURE — 3017F COLORECTAL CA SCREEN DOC REV: CPT | Performed by: FAMILY MEDICINE

## 2019-02-22 RX ORDER — PREDNISONE 10 MG/1
TABLET ORAL
Refills: 0 | COMMUNITY
Start: 2019-02-20 | End: 2019-07-25 | Stop reason: ALTCHOICE

## 2019-02-22 RX ORDER — GABAPENTIN 300 MG/1
CAPSULE ORAL
Refills: 1 | COMMUNITY
Start: 2019-01-03

## 2019-02-22 RX ORDER — METOPROLOL SUCCINATE 25 MG/1
TABLET, EXTENDED RELEASE ORAL
Refills: 3 | COMMUNITY
Start: 2019-02-08

## 2019-02-22 RX ORDER — AMOXICILLIN 500 MG/1
CAPSULE ORAL
Refills: 0 | COMMUNITY
Start: 2019-02-20 | End: 2019-07-25 | Stop reason: ALTCHOICE

## 2019-02-22 RX ORDER — LISINOPRIL AND HYDROCHLOROTHIAZIDE 12.5; 1 MG/1; MG/1
TABLET ORAL
Refills: 3 | COMMUNITY
Start: 2018-12-10 | End: 2019-07-25 | Stop reason: ALTCHOICE

## 2019-02-22 ASSESSMENT — PATIENT HEALTH QUESTIONNAIRE - PHQ9
2. FEELING DOWN, DEPRESSED OR HOPELESS: 0
SUM OF ALL RESPONSES TO PHQ9 QUESTIONS 1 & 2: 0
SUM OF ALL RESPONSES TO PHQ QUESTIONS 1-9: 0
1. LITTLE INTEREST OR PLEASURE IN DOING THINGS: 0
SUM OF ALL RESPONSES TO PHQ QUESTIONS 1-9: 0

## 2019-02-22 ASSESSMENT — ENCOUNTER SYMPTOMS
SHORTNESS OF BREATH: 0
EYES NEGATIVE: 1
BACK PAIN: 1
ALLERGIC/IMMUNOLOGIC NEGATIVE: 1
RESPIRATORY NEGATIVE: 1
GASTROINTESTINAL NEGATIVE: 1

## 2019-02-23 LAB
CREATININE URINE: 208.4 MG/DL
MICROALBUMIN UR-MCNC: 1.6 MG/DL
MICROALBUMIN/CREAT UR-RTO: 7.7 MG/G (ref 0–30)

## 2019-07-24 ENCOUNTER — TELEPHONE (OUTPATIENT)
Dept: FAMILY MEDICINE CLINIC | Age: 74
End: 2019-07-24

## 2019-07-25 ENCOUNTER — OFFICE VISIT (OUTPATIENT)
Dept: FAMILY MEDICINE CLINIC | Age: 74
End: 2019-07-25
Payer: MEDICARE

## 2019-07-25 VITALS
TEMPERATURE: 98.1 F | HEART RATE: 73 BPM | OXYGEN SATURATION: 98 % | BODY MASS INDEX: 29.26 KG/M2 | DIASTOLIC BLOOD PRESSURE: 80 MMHG | SYSTOLIC BLOOD PRESSURE: 128 MMHG | HEIGHT: 74 IN | RESPIRATION RATE: 16 BRPM | WEIGHT: 228 LBS

## 2019-07-25 DIAGNOSIS — Z23 NEED FOR PNEUMOCOCCAL VACCINATION: ICD-10-CM

## 2019-07-25 DIAGNOSIS — R10.84 GENERALIZED ABDOMINAL PAIN: Primary | ICD-10-CM

## 2019-07-25 DIAGNOSIS — L72.3 SEBACEOUS CYST: ICD-10-CM

## 2019-07-25 PROCEDURE — 1036F TOBACCO NON-USER: CPT | Performed by: FAMILY MEDICINE

## 2019-07-25 PROCEDURE — 90732 PPSV23 VACC 2 YRS+ SUBQ/IM: CPT | Performed by: FAMILY MEDICINE

## 2019-07-25 PROCEDURE — 4040F PNEUMOC VAC/ADMIN/RCVD: CPT | Performed by: FAMILY MEDICINE

## 2019-07-25 PROCEDURE — G8417 CALC BMI ABV UP PARAM F/U: HCPCS | Performed by: FAMILY MEDICINE

## 2019-07-25 PROCEDURE — 99214 OFFICE O/P EST MOD 30 MIN: CPT | Performed by: FAMILY MEDICINE

## 2019-07-25 PROCEDURE — 1123F ACP DISCUSS/DSCN MKR DOCD: CPT | Performed by: FAMILY MEDICINE

## 2019-07-25 PROCEDURE — G0009 ADMIN PNEUMOCOCCAL VACCINE: HCPCS | Performed by: FAMILY MEDICINE

## 2019-07-25 PROCEDURE — G8427 DOCREV CUR MEDS BY ELIG CLIN: HCPCS | Performed by: FAMILY MEDICINE

## 2019-07-25 PROCEDURE — G8598 ASA/ANTIPLAT THER USED: HCPCS | Performed by: FAMILY MEDICINE

## 2019-07-25 RX ORDER — TRIAMTERENE AND HYDROCHLOROTHIAZIDE 37.5; 25 MG/1; MG/1
1 TABLET ORAL DAILY
Qty: 90 TABLET | Refills: 3 | Status: SHIPPED | OUTPATIENT
Start: 2019-07-25 | End: 2021-03-29

## 2019-07-25 ASSESSMENT — ENCOUNTER SYMPTOMS
ALLERGIC/IMMUNOLOGIC NEGATIVE: 1
GASTROINTESTINAL NEGATIVE: 1
EYES NEGATIVE: 1
RESPIRATORY NEGATIVE: 1
SHORTNESS OF BREATH: 0

## 2019-07-26 ENCOUNTER — HOSPITAL ENCOUNTER (OUTPATIENT)
Dept: CT IMAGING | Age: 74
Discharge: HOME | End: 2019-07-28
Payer: MEDICARE

## 2019-07-26 DIAGNOSIS — R10.84 GENERALIZED ABDOMINAL PAIN: ICD-10-CM

## 2019-07-26 PROCEDURE — 74176 CT ABD & PELVIS W/O CONTRAST: CPT

## 2019-07-26 PROCEDURE — 2500000003 HC RX 250 WO HCPCS: Performed by: FAMILY MEDICINE

## 2019-07-26 RX ADMIN — BARIUM SULFATE 450 ML: 20 SUSPENSION ORAL at 09:11

## 2020-09-24 ENCOUNTER — VIRTUAL VISIT (OUTPATIENT)
Dept: FAMILY MEDICINE CLINIC | Age: 75
End: 2020-09-24
Payer: MEDICARE

## 2020-09-24 PROCEDURE — 99442 PR PHYS/QHP TELEPHONE EVALUATION 11-20 MIN: CPT | Performed by: FAMILY MEDICINE

## 2020-09-24 RX ORDER — AMOXICILLIN AND CLAVULANATE POTASSIUM 500; 125 MG/1; MG/1
1 TABLET, FILM COATED ORAL 3 TIMES DAILY
Qty: 30 TABLET | Refills: 0 | Status: SHIPPED | OUTPATIENT
Start: 2020-09-24 | End: 2020-10-04

## 2020-09-24 SDOH — ECONOMIC STABILITY: TRANSPORTATION INSECURITY
IN THE PAST 12 MONTHS, HAS LACK OF TRANSPORTATION KEPT YOU FROM MEETINGS, WORK, OR FROM GETTING THINGS NEEDED FOR DAILY LIVING?: NO

## 2020-09-24 SDOH — ECONOMIC STABILITY: INCOME INSECURITY: HOW HARD IS IT FOR YOU TO PAY FOR THE VERY BASICS LIKE FOOD, HOUSING, MEDICAL CARE, AND HEATING?: NOT HARD AT ALL

## 2020-09-24 SDOH — ECONOMIC STABILITY: FOOD INSECURITY: WITHIN THE PAST 12 MONTHS, THE FOOD YOU BOUGHT JUST DIDN'T LAST AND YOU DIDN'T HAVE MONEY TO GET MORE.: NEVER TRUE

## 2020-09-24 SDOH — ECONOMIC STABILITY: TRANSPORTATION INSECURITY
IN THE PAST 12 MONTHS, HAS THE LACK OF TRANSPORTATION KEPT YOU FROM MEDICAL APPOINTMENTS OR FROM GETTING MEDICATIONS?: NO

## 2020-09-24 SDOH — ECONOMIC STABILITY: FOOD INSECURITY: WITHIN THE PAST 12 MONTHS, YOU WORRIED THAT YOUR FOOD WOULD RUN OUT BEFORE YOU GOT MONEY TO BUY MORE.: NEVER TRUE

## 2020-09-24 ASSESSMENT — PATIENT HEALTH QUESTIONNAIRE - PHQ9
1. LITTLE INTEREST OR PLEASURE IN DOING THINGS: 0
SUM OF ALL RESPONSES TO PHQ9 QUESTIONS 1 & 2: 0
SUM OF ALL RESPONSES TO PHQ QUESTIONS 1-9: 0
2. FEELING DOWN, DEPRESSED OR HOPELESS: 0
SUM OF ALL RESPONSES TO PHQ QUESTIONS 1-9: 0

## 2020-09-24 ASSESSMENT — ENCOUNTER SYMPTOMS
COUGH: 1
TROUBLE SWALLOWING: 0
SHORTNESS OF BREATH: 0

## 2020-09-24 NOTE — PROGRESS NOTES
Subjective:      Patient ID: Elizabeth Pham is a 66 y.o. male    HPI  Here with acute visit. Routinely seen by . Developed fever and chills and cough about 4 days ago. Seen by [facility] and had covid testing   Done yesterday and results were neg today as he was informed     No vomiting or diarrhea. Does have copd-still having cough but does feel slightly better today. No sore throat. Review of Systems   Constitutional: Positive for fatigue. HENT: Negative for trouble swallowing. Respiratory: Positive for cough. Negative for shortness of breath. Cardiovascular: Negative for chest pain. Skin: Negative for rash. Neurological: Negative for weakness.      Reviewed allergy, medical, social, surgical, family and med list changes and updated   Files     Social History     Socioeconomic History    Marital status:      Spouse name: Not on file    Number of children: Not on file    Years of education: Not on file    Highest education level: Not on file   Occupational History    Not on file   Social Needs    Financial resource strain: Not on file    Food insecurity     Worry: Not on file     Inability: Not on file    Transportation needs     Medical: Not on file     Non-medical: Not on file   Tobacco Use    Smoking status: Former Smoker     Packs/day: 1.00     Years: 25.00     Pack years: 25.00     Types: Cigarettes     Last attempt to quit: 1992     Years since quittin.6    Smokeless tobacco: Never Used   Substance and Sexual Activity    Alcohol use: Yes     Comment: occasional     Drug use: No    Sexual activity: Not on file   Lifestyle    Physical activity     Days per week: Not on file     Minutes per session: Not on file    Stress: Not on file   Relationships    Social connections     Talks on phone: Not on file     Gets together: Not on file     Attends Latter day service: Not on file     Active member of club or organization: Not on file     Attends meetings of pulmonary disease) ([facility]     past smoker > 25 yr habit / quit 25 yrs ago    Hyperlipidemia     past trx / cannot tolerate statins    Hypertension     meds > 10 yrs / denies TIA or stroke    PMR (polymyalgia rheumatica) ([facility]    Huang Otero is a 66 y.o. male evaluated via telephone on 9/24/2020. Consent:  He and/or health care decision maker is aware that that he may receive a bill for this telephone service, depending on his insurance coverage, and has provided verbal consent to proceed: Yes  Patient accepts tele health phone visit   Visit about 15min     Documentation:  I communicated with the patient and/or health care decision maker . Details of this discussion including any medical advice provided: This visit was a telephone encounter. Patient was located at their home. Provider was located at their __x_ home or        ____ office. I affirm this is a Patient Initiated Episode with an Established Patient who has not had a related appointment within my department in the past 7 days or scheduled within the next 24 hours. Total Time: minutes: 11-20 minutes    Note: not billable if this call serves to triage the patient into an appointment for the relevant concern      Columbasurendra Seolu   Objective: There were no vitals taken for this visit. Physical Exam  No exam done   Assessment:       Diagnosis Orders   1.  Acute bronchitis, unspecified organism  XR CHEST STANDARD (2 VW)   2. Chronic obstructive pulmonary disease, unspecified COPD type (HCC)  XR CHEST STANDARD (2 VW)         Plan:        Orders Placed This Encounter   Procedures    XR CHEST STANDARD (2 VW)     Standing Status:   Future     Standing Expiration Date:   9/24/2021     Orders Placed This Encounter   Medications    amoxicillin-clavulanate (AUGMENTIN) 500-125 MG per tablet     Sig: Take 1 tablet by mouth 3 times daily for 10 days     Dispense:  30 tablet     Refill:  0   f/u dependent on above

## 2020-10-16 ENCOUNTER — OFFICE VISIT (OUTPATIENT)
Dept: FAMILY MEDICINE CLINIC | Age: 75
End: 2020-10-16
Payer: MEDICARE

## 2020-10-16 VITALS
DIASTOLIC BLOOD PRESSURE: 70 MMHG | WEIGHT: 219.2 LBS | TEMPERATURE: 98.1 F | HEIGHT: 74 IN | BODY MASS INDEX: 28.13 KG/M2 | OXYGEN SATURATION: 96 % | SYSTOLIC BLOOD PRESSURE: 130 MMHG | HEART RATE: 83 BPM

## 2020-10-16 DIAGNOSIS — R53.83 FATIGUE, UNSPECIFIED TYPE: ICD-10-CM

## 2020-10-16 DIAGNOSIS — Z12.5 PROSTATE CANCER SCREENING: ICD-10-CM

## 2020-10-16 LAB
ALBUMIN SERPL-MCNC: 3.9 G/DL (ref 3.5–4.6)
ALP BLD-CCNC: 46 U/L (ref 35–104)
ALT SERPL-CCNC: 25 U/L (ref 0–41)
ANION GAP SERPL CALCULATED.3IONS-SCNC: 11 MEQ/L (ref 9–15)
AST SERPL-CCNC: 29 U/L (ref 0–40)
BASOPHILS ABSOLUTE: 0.1 K/UL (ref 0–0.2)
BASOPHILS RELATIVE PERCENT: 1.1 %
BILIRUB SERPL-MCNC: <0.2 MG/DL (ref 0.2–0.7)
BUN BLDV-MCNC: 22 MG/DL (ref 8–23)
CALCIUM SERPL-MCNC: 9.7 MG/DL (ref 8.5–9.9)
CHLORIDE BLD-SCNC: 105 MEQ/L (ref 95–107)
CHOLESTEROL, TOTAL: 173 MG/DL (ref 0–199)
CO2: 28 MEQ/L (ref 20–31)
CREAT SERPL-MCNC: 1.23 MG/DL (ref 0.7–1.2)
EOSINOPHILS ABSOLUTE: 1 K/UL (ref 0–0.7)
EOSINOPHILS RELATIVE PERCENT: 12.3 %
GFR AFRICAN AMERICAN: >60
GFR NON-AFRICAN AMERICAN: 57
GLOBULIN: 3 G/DL (ref 2.3–3.5)
GLUCOSE BLD-MCNC: 112 MG/DL (ref 70–99)
HCT VFR BLD CALC: 45.9 % (ref 42–52)
HDLC SERPL-MCNC: 42 MG/DL (ref 40–59)
HEMOGLOBIN: 15.3 G/DL (ref 14–18)
LDL CHOLESTEROL CALCULATED: 100 MG/DL (ref 0–129)
LYMPHOCYTES ABSOLUTE: 1.9 K/UL (ref 1–4.8)
LYMPHOCYTES RELATIVE PERCENT: 22.3 %
MCH RBC QN AUTO: 31.8 PG (ref 27–31.3)
MCHC RBC AUTO-ENTMCNC: 33.2 % (ref 33–37)
MCV RBC AUTO: 95.8 FL (ref 80–100)
MONOCYTES ABSOLUTE: 0.6 K/UL (ref 0.2–0.8)
MONOCYTES RELATIVE PERCENT: 6.6 %
NEUTROPHILS ABSOLUTE: 4.9 K/UL (ref 1.4–6.5)
NEUTROPHILS RELATIVE PERCENT: 57.7 %
PDW BLD-RTO: 15.5 % (ref 11.5–14.5)
PLATELET # BLD: 271 K/UL (ref 130–400)
POTASSIUM SERPL-SCNC: 4.5 MEQ/L (ref 3.4–4.9)
PROSTATE SPECIFIC ANTIGEN: 21.2 NG/ML (ref 0–6.22)
RBC # BLD: 4.79 M/UL (ref 4.7–6.1)
SEDIMENTATION RATE, ERYTHROCYTE: 13 MM (ref 0–20)
SODIUM BLD-SCNC: 144 MEQ/L (ref 135–144)
TOTAL PROTEIN: 6.9 G/DL (ref 6.3–8)
TRIGL SERPL-MCNC: 153 MG/DL (ref 0–150)
TSH SERPL DL<=0.05 MIU/L-ACNC: 2.25 UIU/ML (ref 0.44–3.86)
WBC # BLD: 8.5 K/UL (ref 4.8–10.8)

## 2020-10-16 PROCEDURE — 1036F TOBACCO NON-USER: CPT | Performed by: FAMILY MEDICINE

## 2020-10-16 PROCEDURE — 99214 OFFICE O/P EST MOD 30 MIN: CPT | Performed by: FAMILY MEDICINE

## 2020-10-16 PROCEDURE — G8427 DOCREV CUR MEDS BY ELIG CLIN: HCPCS | Performed by: FAMILY MEDICINE

## 2020-10-16 PROCEDURE — G8484 FLU IMMUNIZE NO ADMIN: HCPCS | Performed by: FAMILY MEDICINE

## 2020-10-16 PROCEDURE — G8417 CALC BMI ABV UP PARAM F/U: HCPCS | Performed by: FAMILY MEDICINE

## 2020-10-16 PROCEDURE — G0008 ADMIN INFLUENZA VIRUS VAC: HCPCS | Performed by: FAMILY MEDICINE

## 2020-10-16 PROCEDURE — 4040F PNEUMOC VAC/ADMIN/RCVD: CPT | Performed by: FAMILY MEDICINE

## 2020-10-16 PROCEDURE — 1123F ACP DISCUSS/DSCN MKR DOCD: CPT | Performed by: FAMILY MEDICINE

## 2020-10-16 PROCEDURE — 90694 VACC AIIV4 NO PRSRV 0.5ML IM: CPT | Performed by: FAMILY MEDICINE

## 2020-10-16 ASSESSMENT — ENCOUNTER SYMPTOMS
RESPIRATORY NEGATIVE: 1
ALLERGIC/IMMUNOLOGIC NEGATIVE: 1
BACK PAIN: 1
GASTROINTESTINAL NEGATIVE: 1
SHORTNESS OF BREATH: 0
EYES NEGATIVE: 1

## 2020-10-16 NOTE — PROGRESS NOTES
Vaccine Information Sheet, \"Influenza - Inactivated\"  given to Destiny Tejada, or parent/legal guardian of  Destiny Tejada and verbalized understanding. Patient responses:    Have you ever had a reaction to a flu vaccine? No  Are you able to eat eggs without adverse effects? No  Do you have any current illness? No  Have you ever had Guillian [place] Syndrome? No    Flu vaccine given per order. Please see immunization tab.

## 2020-10-16 NOTE — PROGRESS NOTES
Patient is seen in follow up for   Chief Complaint   Patient presents with    Back Pain     Lower back pain, radiates down both legs and knees. Back, around the waist is hurting terribly. Not sure if it's arthritis. Constant, gets worse when he's more active. Back Pain   This is a chronic problem. The current episode started more than 1 year ago. The problem is unchanged. The pain is present in the lumbar spine. The pain is at a severity of 4/10. The pain is moderate. Pertinent negatives include no chest pain or fever.        Past Medical History:   Diagnosis Date    Arthritis     R/A    CAD (coronary artery disease)     cardiac stents x 3    COPD (chronic obstructive pulmonary disease) ([facility])     past smoker > 25 yr habit / quit 25 yrs ago    Hyperlipidemia     past trx / cannot tolerate statins    Hypertension     meds > 10 yrs / denies TIA or stroke    PMR (polymyalgia rheumatica) ([facility])      Patient Active Problem List    Diagnosis Date Noted    RLS (restless legs syndrome) 01/03/2018    Carpal tunnel syndrome of right wrist 01/03/2018    Former smoker, stopped smoking in distant past 01/03/2018    Elevated PSA 06/19/2014    CKD (chronic kidney disease) stage 3, GFR 30-59 ml/min 05/29/2014    Chronic back pain 05/29/2014    Statin intolerance 12/05/2013    Claudication of gluteal region ([address] 12/05/2013    Trigger index finger of left hand 06/27/2013    Degenerative arthritis of lumbar spine 10/25/2012    Arthritis of shoulder region 10/25/2012    CTS (carpal tunnel syndrome) 09/20/2012    Vitamin D deficiency 07/16/2012    BPH (benign prostatic hyperplasia) 07/10/2012    Neuropathy 07/10/2012    Eczema 01/10/2012    Rheumatoid arthritis ([address] 01/10/2012    CAD (coronary artery disease)     Hypertension      Past Surgical History:   Procedure Laterality Date    CARDIAC SURGERY      2-3 cardiac stents    EYE SURGERY  right    retina hole / Phaco with IOL     HI REVISE MEDIAN N/CARPAL TUNNEL SURG Right 2018    RIGHT WRIST CARPAL TUNNEL RELEASE performed by Camron Villanueva MD at [address] [place] [address] [facility]      2-3 cardiac stents     Family History   Problem Relation Age of Onset    Heart Attack Mother     Cancer Father         [place]    Heart Disease Sister         pacemaker     Social History     Socioeconomic History    Marital status:      Spouse name: None    Number of children: None    Years of education: None    Highest education level: None   Occupational History    None   Social Needs    Financial resource strain: Not hard at all   [place]er[place] insecurity     Worry: Never true     Inability: Never true    Transportation needs     Medical: No     Non-medical: No   Tobacco Use    Smoking status: Former Smoker     Packs/day: 1.00     Years: 25.00     Pack years: 25.00     Types: Cigarettes     Last attempt to quit: 1992     Years since quittin.7    Smokeless tobacco: Never Used   Substance and Sexual Activity    Alcohol use: Yes     Comment: occasional     Drug use: No    Sexual activity: None   Lifestyle    Physical activity     Days per week: None     Minutes per session: None    Stress: None   Relationships    Social connections     Talks on phone: None     Gets together: None     Attends Hinduism service: None     Active member of club or organization: None     Attends meetings of clubs or organizations: None     Relationship status: None    Intimate partner violence     Fear of current or ex partner: None     Emotionally abused: None     Physically abused: None     Forced sexual activity: None   Other Topics Concern    None   Social History Narrative    None     Current Outpatient Medications   Medication Sig Dispense Refill    triamterene-hydrochlorothiazide (MAXZIDE-25) 37.5-25 MG per tablet Take 1 tablet by mouth daily 90 tablet 3    gabapentin (NEURONTIN) 300 MG capsule TAKE 2 CAPSULES BY MOUTH TWICE DAILY  1    metoprolol succinate (TOPROL XL) 25 MG extended release tablet take 1 tablet daily  3    folic acid (FOLVITE) 1 MG tablet Take 1 mg by mouth daily      Nebulizer MISC by Does not apply route      ipratropium-albuterol (DUONEB) 0.5-2.5 (3) MG/3ML SOLN nebulizer solution Inhale 1 vial into the lungs every 4 hours      Misc Natural Products (PROSTATE SUPPORT PO) Take by mouth 2 times daily      vitamin D-3 (CHOLECALCIFEROL) 5000 UNITS TABS Take 5,000 Units by mouth daily.  methotrexate (RHEUMATREX) 2.5 MG chemo tablet Take 4 tablets by mouth once a week. 16 tablet 0    aspirin 81 MG tablet Take 81 mg by mouth daily.  magnesium oxide (MAG-OX) 400 MG tablet Take 400 mg by mouth daily. Current Facility-Administered Medications   Medication Dose Route Frequency Provider Last Rate Last Dose    albuterol (PROVENTIL) nebulizer solution 2.5 mg  2.5 mg Nebulization Once Reola Mourning, DO         Current Outpatient Medications on File Prior to Visit   Medication Sig Dispense Refill    triamterene-hydrochlorothiazide (MAXZIDE-25) 37.5-25 MG per tablet Take 1 tablet by mouth daily 90 tablet 3    gabapentin (NEURONTIN) 300 MG capsule TAKE 2 CAPSULES BY MOUTH TWICE DAILY  1    metoprolol succinate (TOPROL XL) 25 MG extended release tablet take 1 tablet daily  3    folic acid (FOLVITE) 1 MG tablet Take 1 mg by mouth daily      Nebulizer MISC by Does not apply route      ipratropium-albuterol (DUONEB) 0.5-2.5 (3) MG/3ML SOLN nebulizer solution Inhale 1 vial into the lungs every 4 hours      Misc Natural Products (PROSTATE SUPPORT PO) Take by mouth 2 times daily      vitamin D-3 (CHOLECALCIFEROL) 5000 UNITS TABS Take 5,000 Units by mouth daily.  methotrexate (RHEUMATREX) 2.5 MG chemo tablet Take 4 tablets by mouth once a week. 16 tablet 0    aspirin 81 MG tablet Take 81 mg by mouth daily.  magnesium oxide (MAG-OX) 400 MG tablet Take 400 mg by mouth daily.          Current Facility-Administered Medications on File Prior to Visit   Medication Dose Route Frequency Provider Last Rate Last Dose    albuterol (PROVENTIL) nebulizer solution 2.5 mg  2.5 mg Nebulization Once Veldon Phy, DO         Allergies   Allergen Reactions    Seasonal      Sinus congestion    Statins      MYALGIAS     Health Maintenance   Topic Date Due    DTaP/Tdap/Td vaccine (1 - Tdap) 06/27/1962    Potassium monitoring  06/07/2020    Flu vaccine (1) 09/01/2020    Creatinine monitoring  10/09/2020    Shingles Vaccine (1 of 2) 10/01/2025 (Originally 6/27/1993)    PSA counseling  12/03/2020    Pneumococcal 65+ years Vaccine  Completed    Hepatitis A vaccine  Aged Out    Hepatitis B vaccine  Aged Out    Hib vaccine  Aged Out    Meningococcal (ACWY) vaccine  Aged Out       Review of Systems     Review of Systems   Constitutional: Negative for activity change, appetite change, chills, fever and unexpected weight change. HENT: Negative. Eyes: Negative. Respiratory: Negative. Negative for shortness of breath. Cardiovascular: Negative. Negative for chest pain and palpitations. Gastrointestinal: Negative. Endocrine: Negative. Genitourinary: Negative. Musculoskeletal: Positive for back pain. Skin: Negative. Allergic/Immunologic: Negative. Neurological: Negative. Hematological: Negative. Psychiatric/Behavioral: Negative. Physical Exam  Vitals:    10/16/20 0906   BP: 130/70   Site: Left Upper Arm   Position: Sitting   Cuff Size: Medium Adult   Pulse: 83   Temp: 98.1 °F (36.7 °C)   TempSrc: Oral   SpO2: 96%   Weight: 219 lb 3.2 oz (99.4 kg)   Height: 6' 2\" (1.88 m)       Physical Exam  Constitutional:       Appearance: He is well-developed. HENT:      Right Ear: External ear normal.      Left Ear: External ear normal.   Eyes:      Conjunctiva/sclera: Conjunctivae normal.      Pupils: Pupils are equal, round, and reactive to light. Neck:      Musculoskeletal: Normal range of motion and neck supple.       Thyroid: No thyromegaly. Cardiovascular:      Rate and Rhythm: Normal rate and regular rhythm. Heart sounds: Normal heart sounds. No murmur. No friction rub. No gallop. Pulmonary:      Effort: Pulmonary effort is normal. No respiratory distress. Breath sounds: Normal breath sounds. No wheezing. Abdominal:      General: Bowel sounds are normal. There is no distension. Palpations: Abdomen is soft. There is no mass. Tenderness: There is no abdominal tenderness. There is no guarding or rebound. Hernia: No hernia is present. Genitourinary:     Penis: Normal.    Musculoskeletal:      Right shoulder: He exhibits decreased range of motion, tenderness and pain. Lymphadenopathy:      Cervical: No cervical adenopathy. Skin:     General: Skin is warm and dry. Neurological:      Mental Status: He is alert and oriented to person, place, and time. Cranial Nerves: No cranial nerve deficit. Coordination: Coordination normal.         Assessment   Diagnosis Orders   1. Essential hypertension  Comprehensive Metabolic Panel    CBC With Auto Differential   2. Need for influenza vaccination  INFLUENZA, QUADV, ADJUVANTED, 65 YRS =, IM, PF, PREFILL SYR, 0.5ML (FLUAD)   3. Chronic right shoulder pain  XR SHOULDER RIGHT (MIN 2 VIEWS)   4. Rheumatoid arthritis with positive rheumatoid factor, involving unspecified site ([address]     5. Claudication of gluteal region ([address]     6. Prostate cancer screening  Psa screening   7. Fatigue, unspecified type  Sedimentation Rate    TSH without Reflex   8.  Spinal stenosis of lumbar region, unspecified whether neurogenic claudication present       Problem List     Hypertension - Primary    Relevant Orders    Comprehensive Metabolic Panel    CBC With Auto Differential    Rheumatoid arthritis (HCC)    Relevant Medications    aspirin 81 MG tablet    methotrexate (RHEUMATREX) 2.5 MG chemo tablet    Claudication of gluteal region [facility])    Relevant Medications    aspirin 81 MG tablet    gabapentin (NEURONTIN) 300 MG capsule          Plan  Orders Placed This Encounter   Procedures    XR SHOULDER RIGHT (MIN 2 VIEWS)     Standing Status:   Future     Standing Expiration Date:   10/16/2021    INFLUENZA, QUADV, ADJUVANTED, 72 YRS =, IM, PF, PREFILL SYR, 0.5ML (FLUAD)    Comprehensive Metabolic Panel    CBC With Auto Differential    Psa screening     Standing Status:   Future     Standing Expiration Date:   10/16/2021    Sedimentation Rate     Standing Status:   Future     Standing Expiration Date:   10/16/2021    TSH without Reflex     Standing Status:   Future     Standing Expiration Date:   10/16/2021     No orders of the defined types were placed in this encounter. No follow-ups on file.   Chely Moseley MD

## 2020-10-30 ENCOUNTER — HOSPITAL ENCOUNTER (OUTPATIENT)
Dept: MRI IMAGING | Age: 75
Discharge: HOME | End: 2020-11-01
Payer: MEDICARE

## 2020-10-30 PROCEDURE — 72148 MRI LUMBAR SPINE W/O DYE: CPT

## 2020-11-05 ENCOUNTER — VIRTUAL VISIT (OUTPATIENT)
Dept: FAMILY MEDICINE CLINIC | Age: 75
End: 2020-11-05
Payer: MEDICARE

## 2020-11-05 PROCEDURE — 99442 PR PHYS/QHP TELEPHONE EVALUATION 11-20 MIN: CPT | Performed by: FAMILY MEDICINE

## 2020-11-05 ASSESSMENT — ENCOUNTER SYMPTOMS
ALLERGIC/IMMUNOLOGIC NEGATIVE: 1
SHORTNESS OF BREATH: 0
GASTROINTESTINAL NEGATIVE: 1
RESPIRATORY NEGATIVE: 1
EYES NEGATIVE: 1
BACK PAIN: 1

## 2020-11-05 NOTE — PROGRESS NOTES
Arthritis of shoulder region 10/25/2012    CTS (carpal tunnel syndrome) 2012    Vitamin D deficiency 2012    BPH (benign prostatic hyperplasia) 07/10/2012    Neuropathy 07/10/2012    Eczema 01/10/2012    Rheumatoid arthritis ([facility] [address] 01/10/2012    CAD (coronary artery disease)     Hypertension      Past Surgical History:   Procedure Laterality Date    CARDIAC SURGERY      2-3 cardiac stents    EYE SURGERY  right    retina hole / Phaco with IOL     [place] REV[facility] MEDIAN N/CARPAL TUNNEL SURG Right 2018    RIGHT WRIST CARPAL TUNNEL RELEASE performed by Thelma Bynum MD at [address] [place] [address] [facility]      2-3 cardiac stents     Family History   Problem Relation Age of Onset    Heart Attack Mother     Cancer Father         [place]    Heart Disease Sister         pacemaker     Social History     Socioeconomic History    Marital status:      Spouse name: Not on file    Number of children: Not on file    Years of education: Not on file    Highest education level: Not on file   Occupational History    Not on file   Social Needs    Financial resource strain: Not hard at all   [employer] insecurity     Worry: Never true     Inability: Never true   [employer] needs     Medical: No     Non-medical: No   Tobacco Use    Smoking status: Former Smoker     Packs/day: 1.00     Years: 25.00     Pack years: 25.00     Types: Cigarettes     Last attempt to quit: 1992     Years since quittin.7    Smokeless tobacco: Never Used   Substance and Sexual Activity    Alcohol use: Yes     Comment: occasional     Drug use: No    Sexual activity: Not on file   Lifestyle    Physical activity     Days per week: Not on file     Minutes per session: Not on file    Stress: Not on file   Relationships    Social connections     Talks on phone: Not on file     Gets together: Not on file     Attends Caodaism service: Not on file     Active member of club or organization: Not on file     Attends meetings of clubs or organizations: Not on file     Relationship status: Not on file    Intimate partner violence     Fear of current or ex partner: Not on file     Emotionally abused: Not on file     Physically abused: Not on file     Forced sexual activity: Not on file   Other Topics Concern    Not on file   Social History Narrative    Not on file     Current Outpatient Medications   Medication Sig Dispense Refill    triamterene-hydrochlorothiazide (MAXZIDE-25) 37.5-25 MG per tablet Take 1 tablet by mouth daily 90 tablet 3    gabapentin (NEURONTIN) 300 MG capsule TAKE 2 CAPSULES BY MOUTH TWICE DAILY  1    metoprolol succinate (TOPROL XL) 25 MG extended release tablet take 1 tablet daily  3    folic acid (FOLVITE) 1 MG tablet Take 1 mg by mouth daily      Nebulizer MISC by Does not apply route      ipratropium-albuterol (DUONEB) 0.5-2.5 (3) MG/3ML SOLN nebulizer solution Inhale 1 vial into the lungs every 4 hours      Misc Natural Products (PROSTATE SUPPORT PO) Take by mouth 2 times daily      vitamin D-3 (CHOLECALCIFEROL) 5000 UNITS TABS Take 5,000 Units by mouth daily.  methotrexate (RHEUMATREX) 2.5 MG chemo tablet Take 4 tablets by mouth once a week. 16 tablet 0    aspirin 81 MG tablet Take 81 mg by mouth daily.  magnesium oxide (MAG-OX) 400 MG tablet Take 400 mg by mouth daily.          Current Facility-Administered Medications   Medication Dose Route Frequency Provider Last Rate Last Dose    albuterol (PROVENTIL) nebulizer solution 2.5 mg  2.5 mg Nebulization Once Jean Echols, DO         Current Outpatient Medications on File Prior to Visit   Medication Sig Dispense Refill    triamterene-hydrochlorothiazide (MAXZIDE-25) 37.5-25 MG per tablet Take 1 tablet by mouth daily 90 tablet 3    gabapentin (NEURONTIN) 300 MG capsule TAKE 2 CAPSULES BY MOUTH TWICE DAILY  1    metoprolol succinate (TOPROL XL) 25 MG extended release tablet take 1 tablet daily  3    folic acid (FOLVITE) 1 MG tablet Take Neurological: Negative. Hematological: Negative. Psychiatric/Behavioral: Negative. Physical Exam  There were no vitals filed for this visit. Physical Exam    Assessment   Diagnosis Orders   1. Claudication of gluteal region [facility])  Ambulatory referral to Neurosurgery   2. Spinal stenosis of lumbar region, unspecified whether neurogenic claudication present  Ambulatory referral to Neurosurgery     Problem List     Claudication of gluteal region [facility]) - Primary    Relevant Medications    aspirin 81 MG tablet    gabapentin (NEURONTIN) 300 MG capsule    Other Relevant Orders    Ambulatory referral to Neurosurgery          Plan  Orders Placed This Encounter   Procedures    Ambulatory referral to Neurosurgery     Referral Priority:   Routine     Referral Type:   Eval and Treat     Referral Reason:   Specialty Services Required     Referred to Provider:   Roro Yanes MD     Requested Specialty:   Neurosurgery     Number of Visits Requested:   1     No orders of the defined types were placed in this encounter. No follow-ups on file.   Len Patel MD

## 2020-12-08 PROBLEM — M48.062 SPINAL STENOSIS OF LUMBAR REGION WITH NEUROGENIC CLAUDICATION: Status: ACTIVE | Noted: 2020-12-08

## 2020-12-08 PROBLEM — M75.01 ADHESIVE CAPSULITIS OF RIGHT SHOULDER: Status: ACTIVE | Noted: 2020-12-08

## 2020-12-17 ENCOUNTER — HOSPITAL ENCOUNTER (OUTPATIENT)
Dept: PHYSICAL THERAPY | Age: 75
Setting detail: THERAPIES SERIES
Discharge: HOME | End: 2020-12-17
Payer: MEDICARE

## 2020-12-17 PROCEDURE — 97530 THERAPEUTIC ACTIVITIES: CPT

## 2020-12-17 PROCEDURE — 97162 PT EVAL MOD COMPLEX 30 MIN: CPT

## 2020-12-17 PROCEDURE — 97110 THERAPEUTIC EXERCISES: CPT

## 2020-12-17 NOTE — PROGRESS NOTES
Physical Therapy  Initial Assessment  Date: 2020  Patient Name: Yazmin Rose  MRN: [id number]  : 1945     Treatment Diagnosis: Lumbar spinal stenosis with neurogenic claudication, lumbar spondylosis and right shoulder adhesive capsulitis         Subjective   General  Chart Reviewed: Yes  Patient assessed for rehabilitation services?: Yes  Additional Pertinent Hx: Pt reports having back pain for approx 1 years & legs \"giving out\" had gotten worse over the past year and right right shoulder issues \"for years\"  Family / Caregiver Present: No  Referring Practitioner: Dr Ondina Robins  Referral Date : 20  Diagnosis: Spinal stenosis of lumbar neurogenic claudication, lumbar spondylosis and Adhesive capsulitis right shoulder  PT Visit Information  Onset Date: 19  Total # of Visits Approved: 12(1-2x week for 4-6 weeks=12)  Total # of Visits to Date: 1  Plan of Care/Certification Expiration Date: 21  No Show: 0  Canceled Appointment: 0  Subjective  Subjective: Pt reports not having back pain currently in sitting but had increased 3-4/10 low back pain in standing  Pain Screening  Patient Currently in Pain: No  Vital Signs  Patient Currently in Pain: No         Objective  Pt demonstrates Full Right shoulder flex (170 degrees)  Right shoulder abd=170 degrees  Right shoulder ER with elbow at his side= 55 degrees     75% of trunk flexion; <25% of trunk extension;  Full SB R and L and full Rotation L and R  90-90 test=35 degrees right and left  Neg SLR R and L and neg hip grinding test B hips          AROM RLE (degrees)  RLE AROM: WNL  AROM LLE (degrees)  LLE AROM : WNL    Strength RLE  R Hip Flexion: 4/5  R Hip ABduction: 4/5  R Hip ADduction: 4/5  R Knee Flexion: 4/5  R Knee Extension: 4/5  R Ankle Dorsiflexion: 4/5  R Ankle Plantar flexion: 4/5  Strength LLE  L Hip Flexion: 4/5  L Hip ABduction: 4/5  L Hip ADduction: 4/5  L Knee Flexion: 4/5  L Knee Extension: 4/5  L Ankle Dorsiflexion: 4/5 L Ankle Plantar Flexion: 4/5  Strength RUE  R Shoulder Flexion: 4-/5  R Shoulder ABduction: 4/5  R Shoulder Internal Rotation: 4/5  R Shoulder External Rotation: 4/5  R Shoulder Horizontal ABduction: 4/5  R Shoulder Horizontal ADduction: 4/5  R Elbow Flexion: 4/5  R Elbow Extension: 4/5  Strength LUE  L Shoulder Flexion: 4/5  L Shoulder ABduction: 4/5  L Shoulder Internal Rotation: 4/5  L Shoulder External Rotation: 4/5  L Elbow Flexion: 4/5  L Elbow Extension: 4/5                            Exercises  Exercise 1: seated HS stretch x 3 H20                      Assessment   Conditions Requiring Skilled Therapeutic Intervention  Body structures, Functions, Activity limitations: Decreased functional mobility ; Decreased sensation; Increased pain;Decreased balance;Decreased ROM; Decreased strength;Decreased endurance  Assessment: Pt is 67 yo male who reports having back pain and right shoulder pain for approx 1 year and that his pain is getting worse. Pt was dx with lumbar spinal stenosis with neurogenic claudication, lumbar spondylosis and right shoulder adhesive capsulitis. PT completed evaluation and discussed pts many questions. PT then instructed pt in seated HS stretch and demonstrated standing stretch. To give pt a copy of HEP on his next visit. Discussed the need for a strengthening program for UEs, core and LEs. Pt reports not liking to exercise but agreeable to POC.   Treatment Diagnosis: Lumbar spinal stenosis with neurogenic claudication, lumbar spondylosis and right shoulder adhesive capsulitis  Prognosis: Good  Decision Making: Medium Complexity  REQUIRES PT FOLLOW UP: Yes         Plan   Plan  Times per week: 1-2  Plan weeks: 4-6  Current Treatment Recommendations: Strengthening, Home Exercise Program, ROM, Balance Training, Endurance Training, Manual Therapy - Joint Manipulation, Gait Training, Modalities, Pain Management  Plan Comment: Estim, US, KT tape, CP/HP    G-Code       OutComes Score AM-PAC Score             Goals  Short term goals  Time Frame for Short term goals: 1-2 weeks  Short term goal 1: Pt reports back pain as 3/10 or less with increased activity and 3/10 R raquel pain when reaching overhead  Short term goal 2: Pt reports able to perform ther ex 3x week  Long term goals  Time Frame for Long term goals : 4-6 weeks  Long term goal 1: Pt reports back pain and right shoulder pain as 1/10 or less when performing household chores and yard work  Long term goal 2:  Increase B LE and core strength to 4+/5 or > so pt can perform chores at home  Long term goal 3: Pt reports able to sleep on his right shoulder without waking due to pain  Long term goal 4: Pt reports a decrease in S/S down his legs by 75% or better  Long term goal 5: Pt indep with HEP  Patient Goals   Patient goals : Pt get rid of his pain       Therapy Time   Individual Concurrent Group Co-treatment   Time In  9:00am          Time Out  10:00am          Minutes  60 min                  MICAELA Longoria#[id number]

## 2020-12-22 ENCOUNTER — HOSPITAL ENCOUNTER (OUTPATIENT)
Dept: PHYSICAL THERAPY | Age: 75
Setting detail: THERAPIES SERIES
Discharge: HOME | End: 2020-12-22
Payer: MEDICARE

## 2020-12-22 PROCEDURE — 97530 THERAPEUTIC ACTIVITIES: CPT

## 2020-12-22 PROCEDURE — 97110 THERAPEUTIC EXERCISES: CPT

## 2020-12-22 PROCEDURE — 97140 MANUAL THERAPY 1/> REGIONS: CPT

## 2020-12-22 ASSESSMENT — PAIN DESCRIPTION - ORIENTATION: ORIENTATION: RIGHT;LEFT;LOWER

## 2020-12-22 ASSESSMENT — PAIN DESCRIPTION - LOCATION: LOCATION: BACK;LEG;SHOULDER

## 2020-12-22 ASSESSMENT — PAIN DESCRIPTION - PAIN TYPE: TYPE: ACUTE PAIN

## 2020-12-22 NOTE — PROGRESS NOTES
Physical Therapy  Daily Treatment Note  Date: 2020  Patient Name: Zeferino Moreland  MRN: [id number]     :   1945    Treatment Diagnosis: Lumbar spinal stenosis with neurogenic claudication, lumbar spondylosis and right shoulder adhesive capsulitis    Subjective:   General  Chart Reviewed: Yes  Additional Pertinent Hx: Pt reports having back pain for approx 1 years & legs \"giving out\" had gotten worse over the past year and right right shoulder issues \"for years\"  Family / Caregiver Present: No  Referring Practitioner: Dr Rivka Malik  PT Visit Information  Onset Date: 19  Total # of Visits Approved: 12(1-2x week for 4-6 weeks=12)  Total # of Visits to Date: 2  Plan of Care/Certification Expiration Date: 21  No Show: 0  Canceled Appointment: 0  Subjective  Subjective: Pt. states spurs on shd and sleeps on sides due to diffculty breathing while sleeping on back. Pt. rpeorts pain with active use of R shd approx one year. Pt. also rpeorts constant ache inc pain with bending. States uses heating pad for back pain . Pain Screening  Patient Currently in Pain: Yes  Pain Assessment  Pain Assessment: 0-10(5-6/10 in back and bilat leg.  4/10 with shd )  Pain Type: Acute pain  Pain Location: Back;Leg;Shoulder  Pain Orientation: Right;Left;Lower  Pain Radiating Towards: shd achy inc pain with movement, back and bilat legs R>L achey tight burning N/T   Vital Signs  Patient Currently in Pain: Yes       Treatment Activities:   Manual therapy  PROM: PROM/Stretching B hips HS and IR/ER to inc ROM and dec pain               Ambulation  Ambulation?: No                   Exercises  Exercise 1: seated hamstring stretch hold 30 sec x 3   Exercise 2: scap retractions x 10 hold 3 sec   Exercise 3: LTR 10 sec x 5 ea direction  Exercise 4: butterfly stretch 30 sec x 3   Exercise 5: figure 4 stretch 30 sec x 3 bilat  Exercise 6: bridge with abd bracing x 5 hold 3 sec      Modalities Moist heat: Uses heat on back helps with pain declined at end of  therapy session. Cryotherapy (Minutes\Location): Pt. declined   Manual therapy  PROM: PROM/Stretching B hips HS and IR/ER to inc ROM and dec pain                           Assessment:   Conditions Requiring Skilled Therapeutic Intervention  Body structures, Functions, Activity limitations: Decreased functional mobility ; Decreased sensation; Increased pain;Decreased balance;Decreased ROM; Decreased strength;Decreased endurance  Assessment: Pt. demos dec ROM and painful ROM in bilat hips. Perfformed gentle stretching also manual stretching /PROM to reduce strain on spine and dec pain. Pt. also exhibits weakness with depressed R shd and winging scap added scap retractions to improve stability. Will attempt gentle strengthening next session. Pt. stating not an exerciser and only issued scpa retractions and seated HS stretch to HEP for hopes of consistnecy and inc carryover. Discussed with pt. gentle scap retraction prior to active use of R shd foe inc stability also discussed to square up with all activities and one foot slightly forward with bending to reduce strain. Lang Bradley post did not rate pain. Pt. states will use heat at home on back. Educated ice on shd to reduce pain and inflammation.     Treatment Diagnosis: Lumbar spinal stenosis with neurogenic claudication, lumbar spondylosis and right shoulder adhesive capsulitis  Prognosis: Good  REQUIRES PT FOLLOW UP: Yes      G-Code:     OutComes Score                                                     Goals:  Short term goals  Time Frame for Short term goals: 1-2 weeks  Short term goal 1: Pt reports back pain as 3/10 or less with increased activity and 3/10 R raquel pain when reaching overhead  Short term goal 2: Pt reports able to perform ther ex 3x week  Long term goals  Time Frame for Long term goals : 4-6 weeks Long term goal 1: Pt reports back pain and right shoulder pain as 1/10 or less when performing household chores and yard work  Long term goal 2:  Increase B LE and core strength to 4+/5 or > so pt can perform chores at home  Long term goal 3: Pt reports able to sleep on his right shoulder without waking due to pain  Long term goal 4: Pt reports a decrease in S/S down his legs by 75% or better  Long term goal 5: Pt indep with HEP  Patient Goals   Patient goals : Pt get rid of his pain    Plan:      Plan  Times per week: 1-2  Plan weeks: 4-6  Current Treatment Recommendations: Strengthening, Home Exercise Program, ROM, Balance Training, Endurance Training, Manual Therapy - Joint Manipulation, Gait Training, Modalities, Pain Management  Plan Comment: Purvi, US, KT tape, CP/HP        Therapy Time   Individual Concurrent Group Co-treatment   Time In  1000         Time Out  1100         Minutes  [address] [place] [address]  License and [address] Number: [id number]

## 2020-12-28 ENCOUNTER — HOSPITAL ENCOUNTER (OUTPATIENT)
Dept: PHYSICAL THERAPY | Age: 75
Setting detail: THERAPIES SERIES
Discharge: HOME | End: 2020-12-28
Payer: MEDICARE

## 2020-12-28 PROCEDURE — 97110 THERAPEUTIC EXERCISES: CPT

## 2020-12-28 PROCEDURE — 97140 MANUAL THERAPY 1/> REGIONS: CPT

## 2020-12-28 ASSESSMENT — PAIN DESCRIPTION - PAIN TYPE: TYPE: ACUTE PAIN

## 2020-12-28 ASSESSMENT — PAIN DESCRIPTION - LOCATION: LOCATION: BACK;LEG;SHOULDER

## 2020-12-28 NOTE — PROGRESS NOTES
Manual therapy  PROM: PROM/Stretching B hips HS and IR/ER to inc ROM and dec pain                           Assessment:   Conditions Requiring Skilled Therapeutic Intervention  Body structures, Functions, Activity limitations: Decreased functional mobility ; Decreased sensation; Increased pain;Decreased balance;Decreased ROM; Decreased strength;Decreased endurance  Assessment: Discussed with patient at length of importance of consistency with HEP and pt. reports he has never been an exerciser. Discussed with pt. therapy is not going to help if not performing at home. Pt. so far has been \"trying to do HS stretches and scap retractions. Pain less post session \"it feels alittle better\" with pt. rubbing the back of his legs pt. did not rate pain. Pt. has F/U with MD on Jan 7 th. Treatment Diagnosis: Lumbar spinal stenosis with neurogenic claudication, lumbar spondylosis and right shoulder adhesive capsulitis  Prognosis: Good  REQUIRES PT FOLLOW UP: Yes      G-Code:     OutComes Score                                                     Goals:  Short term goals  Time Frame for Short term goals: 1-2 weeks  Short term goal 1: Pt reports back pain as 3/10 or less with increased activity and 3/10 R raquel pain when reaching overhead  Short term goal 2: Pt reports able to perform ther ex 3x week  Long term goals  Time Frame for Long term goals : 4-6 weeks  Long term goal 1: Pt reports back pain and right shoulder pain as 1/10 or less when performing household chores and yard work  Long term goal 2:  Increase B LE and core strength to 4+/5 or > so pt can perform chores at home  Long term goal 3: Pt reports able to sleep on his right shoulder without waking due to pain  Long term goal 4: Pt reports a decrease in S/S down his legs by 75% or better  Long term goal 5: Pt indep with HEP  Patient Goals   Patient goals : Pt get rid of his pain    Plan:      Plan  Times per week: 1-2  Plan weeks: 4-6  Current Treatment Recommendations: Strengthening, Home Exercise Program, ROM, Balance Training, Endurance Training, Manual Therapy - Joint Manipulation, Gait Training, Modalities, Pain Management  Plan Comment: Estim, US, KT tape, CP/HP        Therapy Time   Individual Concurrent Group Co-treatment   Time In  900         Time Out  1000         Minutes  [address] [place] [address]  License and [address] Number: [id number]

## 2020-12-31 ENCOUNTER — HOSPITAL ENCOUNTER (OUTPATIENT)
Dept: PHYSICAL THERAPY | Age: 75
Setting detail: THERAPIES SERIES
Discharge: HOME | End: 2020-12-31
Payer: MEDICARE

## 2020-12-31 PROCEDURE — 97110 THERAPEUTIC EXERCISES: CPT

## 2020-12-31 ASSESSMENT — PAIN DESCRIPTION - PAIN TYPE: TYPE: ACUTE PAIN

## 2020-12-31 NOTE — PROGRESS NOTES
Physical Therapy  Daily Treatment Note  Date: 2020  Patient Name: Ida Sandoval  MRN: [id number]     :   1945    Subjective:   General  Chart Reviewed: Yes  Additional Pertinent Hx: Pt reports having back pain for approx 1 years & legs \"giving out\" had gotten worse over the past year and right right shoulder issues \"for years\"  Family / Caregiver Present: No  Referring Practitioner: Dr Rosaura Qureshi  PT Visit Information  Onset Date: 19  Total # of Visits Approved: 12(1-2x week for 4-6 weeks=12)  Total # of Visits to Date: 4  Plan of Care/Certification Expiration Date: 21  No Show: 0  Canceled Appointment: 0  Subjective  Subjective: Pt reports having back pain \"achy\" 5-6/10 pain and \"achy\" pain down both of his legs and 5/10 Right shoulder pain \"sharp\" pain at times   Pain Screening  Patient Currently in Pain: Yes  Pain Assessment  Pain Assessment: 0-10  Pain Type: Acute pain  Pain Location: Back;Leg  Vital Signs  Patient Currently in Pain: Yes       Treatment Activities:            Exercises  Exercise 1: seated hamstring stretch hold 30 sec x 3   Exercise 2: scap retractions x 10 hold 3 sec   Exercise 3: LTR 30 sec x 3   Exercise 6: bridge with abd bracing x 10 hold 3 sec   Exercise 7: *mid rows x 10 hold 3 sec GTB   Exercise 9: *B ER x 10 hold 3-5 sec GTB   Exercise 12: Supine Ant hip stretch x 3 H15 (increased low back pain in supine)   Exercise 13: Seated back stretch x 3 H30  Exercise 14: Pelvic tilt x 10 H10   Exercise 15: PPT with SLR x 10 H3  Exercise 16: *lat pull downs x 10 H5                                    Assessment:   Conditions Requiring Skilled Therapeutic Intervention  Body structures, Functions, Activity limitations: Decreased functional mobility ; Decreased sensation; Increased pain;Decreased balance;Decreased ROM; Decreased strength;Decreased endurance

## 2021-01-05 ENCOUNTER — HOSPITAL ENCOUNTER (OUTPATIENT)
Dept: PHYSICAL THERAPY | Age: 76
Setting detail: THERAPIES SERIES
Discharge: HOME | End: 2021-01-05
Payer: MEDICARE

## 2021-01-05 PROCEDURE — 97110 THERAPEUTIC EXERCISES: CPT

## 2021-01-05 PROCEDURE — 97140 MANUAL THERAPY 1/> REGIONS: CPT

## 2021-01-05 ASSESSMENT — PAIN DESCRIPTION - DIRECTION: RADIATING_TOWARDS: WAIST DOWN

## 2021-01-05 ASSESSMENT — PAIN DESCRIPTION - LOCATION: LOCATION_2: SHOULDER

## 2021-01-05 ASSESSMENT — PAIN DESCRIPTION - DESCRIPTORS: DESCRIPTORS: ACHING

## 2021-01-05 ASSESSMENT — PAIN DESCRIPTION - ORIENTATION: ORIENTATION: RIGHT;LEFT;POSTERIOR

## 2021-01-05 ASSESSMENT — PAIN SCALES - GENERAL: PAINLEVEL_OUTOF10: 5

## 2021-01-05 NOTE — PROGRESS NOTES
Physical Therapy  Daily Treatment Note  Date: 2021  Patient Name: Francia Velasquez  MRN: [id number]     :   1945    Treatment Diagnosis: Lumbar spinal stenosis with neurogenic claudication, lumbar spondylosis and right shoulder adhesive capsulitis    Subjective:   General  Chart Reviewed: Yes  Additional Pertinent Hx: Pt reports having back pain for approx 1 years & legs \"giving out\" had gotten worse over the past year and right right shoulder issues \"for years\"  Family / Caregiver Present: No  Referring Practitioner: Dr Sebastian Lr  PT Visit Information  Onset Date: 19  Total # of Visits Approved: 12(1-2x week for 4-6 weeks=12)  Total # of Visits to Date: 5  Plan of Care/Certification Expiration Date: 21  No Show: 0  Canceled Appointment: 0  Subjective  Subjective: \"My back and my waist nothing helps that. Doing the exercises seems to help my mobility. \"  Pt. reports no change in pain level in back, waist, and hips since inititating PT.  Pt. also reports shd pain \"about the same. \"  Pt. issued HEP last session. States \"trying to do them. \"  Pt. reports inc pain in back post taking down Josette tree,   \"I didn't use any ice or heat. I usually use the heating pad, that helps with my back. \"   Pain Screening  Patient Currently in Pain: Yes  Pain Assessment  Pain Assessment: 0-10  Pain Level: 5  Pain Type: Acute pain  Pain Location: (Back and legs also neutorpathy in bilat feet. )  Pain Orientation: Right;Left;Posterior  Pain Radiating Towards: \"waist down\"    Pain Descriptors: Aching  Pain Frequency: (worsening by the end of the day and with bending )  Pain 2  Pain Rating 2: (R shd no pain at rest inc pain mild with reahing and pulling)  Pain Type 2: Acute Pain  Pain Location 2: Shoulder  Pain Orientation 2: Anterior;Right  Pain Descriptors 2: (Intense )  Vital Signs  Patient Currently in Pain: Yes       Treatment Activities:                              AROM RLE (degrees)  R Hip Flexion 0-125: 110 deg R Hip Extension 0-10: 8 deg (inc pain )  R Hip ABduction 0-45: 12 deg   R Hip External Rotation 0-45: 30 deg   R Hip Internal Rotation 0-45: 28 deg   AROM LLE (degrees)  L Hip Flexion 0-125: 108 deg   L Hip Extension 0-10: 8 deg (inc pain )  L Hip ABduction 0-45: 20 deg   L Hip External Rotation 0-45: 25 deg   L Hip Internal Rotation 0-45: 27 deg   Spine  Lumbar: Lumbar Ext 25 % inc pain   Special Tests: HS 90/90 RLE lacks 25 deg improved 10 deg since eval , LLE lacks 32 deg improved 3 deg since eval  Strength RLE  R Hip Flexion: 4/5  R Hip ABduction: 4/5  R Hip ADduction: 4/5  R Knee Flexion: 4/5  R Knee Extension: 4/5  R Ankle Dorsiflexion: 4/5  R Ankle Plantar flexion: 4/5  Strength LLE  L Hip Flexion: 4/5  L Hip ABduction: 4/5  L Hip ADduction: 4/5  L Knee Flexion: 4/5  L Knee Extension: 4/5  L Ankle Dorsiflexion: 4/5  L Ankle Plantar Flexion: 4/5  Strength RUE  R Shoulder Flexion: 4-/5  R Shoulder ABduction: 4/5  R Shoulder Internal Rotation: 4/5  R Shoulder External Rotation: 4/5  R Shoulder Horizontal ABduction: 4/5  R Shoulder Horizontal ADduction: 4/5  R Elbow Flexion: 4/5  R Elbow Extension: 4/5  Strength LUE  L Shoulder Flexion: 4/5  L Shoulder ABduction: 4/5  L Shoulder Internal Rotation: 4/5  L Shoulder External Rotation: 4/5  L Elbow Flexion: 4/5  L Elbow Extension: 4/5    Exercises  Exercise 1: seated hamstring stretch hold 30 sec x 3   Exercise 3: LTR 30 sec x 3   Exercise 4: butterfly stretch 30 sec x 3   Exercise 5: figure 4 stretch 30 sec x 3 bilat  Exercise 6: bridge with abd bracing x 10 hold 3 sec   Exercise 11: supine HS stretch 90/90 position 30 sec x 3   Exercise 12: Doorway hip flexor stretch  Attempted inc pain thus deffered   Exercise 14: Pelvic tilt x 10 H10   Exercise 15: PPT with SLR x 10 H3  Other Activities  Comment: Educated pt. in facing/squaring up with activity also to place one LE forward to allow hinge from hips rather than inc flexion of Lumbar spine.   \"It's hard to teach an old dog new tricks. \"                                 Assessment:   Conditions Requiring Skilled Therapeutic Intervention  Body structures, Functions, Activity limitations: Decreased functional mobility ; Decreased sensation; Increased pain;Decreased balance;Decreased ROM; Decreased strength;Decreased endurance  Assessment: Pt. continues with limited change in pain level since initiating PT. Pt. requires motivation, education, and encouragment for inc participation with therapy and HEP as well as body mechanics. Limited strength and flexibility. Limited abilti and tolerance to ext inc pain and symptoms down BLE \"feels like my legs want to buckle. Pt. unable to tolerate doorway stretch and prone hip ext. Back pain continues to limit pt's ADLS and daily life. Pain inc post session with measurments. Deffered Posutral strengthening due to inc pain. Pain post session 6-7/10 weakening and achey down post thighs to calves. Shd pain unchanged. Pt. declined CP. Treatment Diagnosis: Lumbar spinal stenosis with neurogenic claudication, lumbar spondylosis and right shoulder adhesive capsulitis  Prognosis: Good  REQUIRES PT FOLLOW UP: Yes      G-Code:     OutComes Score                                                     Goals:  Short term goals  Time Frame for Short term goals: 1-2 weeks  Short term goal 1: Pt reports back pain as 3/10 or less with increased activity and 3/10 R raquel pain when reaching overhead(GOAL NOT MET )  Short term goal 2: Pt reports able to perform ther ex 3x week(GOAL MET )  Long term goals  Time Frame for Long term goals : 4-6 weeks  Long term goal 1: Pt reports back pain and right shoulder pain as 1/10 or less when performing household chores and yard work(GOAL NOT MET )  Long term goal 2:  Increase B LE and core strength to 4+/5 or > so pt can perform chores at home  Long term goal 3: Pt reports able to sleep on his right shoulder without waking due to pain(awakens due to pain not met )  Long term goal 4: Pt reports a decrease in S/S down his legs by 75% or better(GOAL NOT MET )  Long term goal 5: Pt indep with HEP(Needs encoragement for inc participation )  Patient Goals   Patient goals : Pt get rid of his pain    Plan:      Plan  Times per week: 1-2  Plan weeks: 4-6  Current Treatment Recommendations: Strengthening, Home Exercise Program, ROM, Balance Training, Endurance Training, Manual Therapy - Joint Manipulation, Gait Training, Modalities, Pain Management  Plan Comment: Estim, US, KT tape, CP/HP        Therapy Time   Individual Concurrent Group Co-treatment   Time In  1000         Time Out  1100         Minutes  [facility]  License and [address] Number: [id number]

## 2021-01-08 ENCOUNTER — HOSPITAL ENCOUNTER (OUTPATIENT)
Dept: PHYSICAL THERAPY | Age: 76
Setting detail: THERAPIES SERIES
Discharge: HOME | End: 2021-01-08
Payer: MEDICARE

## 2021-01-08 PROCEDURE — 97530 THERAPEUTIC ACTIVITIES: CPT

## 2021-01-08 PROCEDURE — 97110 THERAPEUTIC EXERCISES: CPT

## 2021-01-08 ASSESSMENT — PAIN DESCRIPTION - FREQUENCY: FREQUENCY: CONTINUOUS

## 2021-01-08 ASSESSMENT — PAIN SCALES - GENERAL: PAINLEVEL_OUTOF10: 5

## 2021-01-08 ASSESSMENT — PAIN DESCRIPTION - ORIENTATION: ORIENTATION: RIGHT;LEFT

## 2021-01-08 NOTE — PROGRESS NOTES
Physical Therapy  Daily Treatment Note  Date: 2021  Patient Name: Francia Brandt  MRN: [id number]     :   1945    Subjective:   General  Chart Reviewed: Yes  Additional Pertinent Hx: Pt reports having back pain for approx 1 years & legs \"giving out\" had gotten worse over the past year and right right shoulder issues \"for years\"  Family / Caregiver Present: No  Referring Practitioner: Dr Cayden Heller  PT Visit Information  Onset Date: 19  Total # of Visits Approved: 12(1-2x per week for 4-6 weeks= 12 visits )  Total # of Visits to Date: 6  Plan of Care/Certification Expiration Date: 21  No Show: 0  Canceled Appointment: 0  Subjective  Subjective: Pt missed his doctor's appt the other day. \"I got the time screwed up\". Pt states therapy is helping his arm but nothing is helping his back so far. Pt states today his pain is not as bad as it was  and wednesday. Pt states he put heat on his back at home the past few days which decreases his pain. Pt felt like SLR and bridges intensified his pain. Pt reports difficulty walking the past 2 days. Pt reports sitting is comfortable for him and takes his ache away. Pt reports a \"small ache\" in his shoulders.    General Comment  Comments: F/U on 21  Pain Screening  Patient Currently in Pain: Yes  Pain Assessment  Pain Assessment: 0-10  Pain Level: 5  Pain Orientation: Right;Left  Pain Radiating Towards: \"waist, butt and down back of thighs\"  Pain Descriptors: Aching;Tightness  Pain Frequency: Continuous  Vital Signs  Patient Currently in Pain: Yes       Treatment Activities:                                      Exercises  Exercise 7: *mid rows on PB 2 x 10 hold 5 sec BTB   Exercise 15: PB LAQ with abdominal bracing; H3'' x 10   Exercise 16: lat pull downs on PB; H5'' 2 x 10 YTB (good form with minVC's)   Exercise 17: seated hamstring stretch; H30'' x 3 (good form without VC's ) Exercise 18: seated PB bounce; done between stretches and ther ex for pain relief  Exercise 19: PB circles CW/CCW; x 10 ea   Exercise 20: PB lateral shifts; H5-10'' x 10                                    Assessment:   Conditions Requiring Skilled Therapeutic Intervention  Body structures, Functions, Activity limitations: Decreased functional mobility ; Decreased sensation; Increased pain;Decreased balance;Decreased ROM; Decreased strength;Decreased endurance  Assessment: Educated pt with demo on staggered stance with pt demo'ing understanding at sink for proper body mechanics. Pt able to perform seated PB LE ther ex and UE ther ex to inc core strength with no c/o pain as well as tolerating stretching with no inc in pain. Pt reports no change in pain post. Will continue with strengthening next visit especially hamstrings. Pain level 5/10 post and no c/o pain in shoulders. Issued ice to low back for ride home. Continue with POC. Treatment Diagnosis: Lumbar spinal stenosis with neurogenic claudication, lumbar spondylosis and right shoulder adhesive capsulitis  REQUIRES PT FOLLOW UP: Yes      G-Code:     OutComes Score                                                     Goals:  Short term goals  Time Frame for Short term goals: 1-2 weeks  Short term goal 1: Pt reports back pain as 3/10 or less with increased activity and 3/10 R raquel pain when reaching overhead  Short term goal 2: Pt reports able to perform ther ex 3x week  Long term goals  Time Frame for Long term goals : 4-6 weeks  Long term goal 1: Pt reports back pain and right shoulder pain as 1/10 or less when performing household chores and yard work  Long term goal 2:  Increase B LE and core strength to 4+/5 or > so pt can perform chores at home  Long term goal 3: Pt reports able to sleep on his right shoulder without waking due to pain  Long term goal 4: Pt reports a decrease in S/S down his legs by 75% or better  Long term goal 5: Pt indep with HEP Patient Goals   Patient goals : Pt get rid of his pain    Plan:      Plan  Times per week: 1-2  Plan weeks: 4-6  Current Treatment Recommendations: Strengthening, Home Exercise Program, ROM, Balance Training, Endurance Training, Manual Therapy - Joint Manipulation, Gait Training, Modalities, Pain Management  Plan Comment: Estim, US, KT tape, CP/HP        Therapy Time   Individual Concurrent Group Co-treatment   Time In  1000         Time Out  1100         Minutes  1 [facility]  License and [address] Number: [id number]

## 2021-01-15 ENCOUNTER — OFFICE VISIT (OUTPATIENT)
Dept: PODIATRY | Facility: CLINIC | Age: 76
End: 2021-01-15

## 2021-01-15 VITALS — BODY MASS INDEX: 27.59 KG/M2 | WEIGHT: 215 LBS | TEMPERATURE: 98 F | HEIGHT: 74 IN

## 2021-01-15 DIAGNOSIS — M79.672 PAIN IN BOTH FEET: Primary | ICD-10-CM

## 2021-01-15 DIAGNOSIS — L95.9 VASCULITIS OF SKIN: ICD-10-CM

## 2021-01-15 DIAGNOSIS — I83.899 HEMOSIDERIN PIGMENTATION OF LOWER EXTREMITY DUE TO VARICOSE VEINS: ICD-10-CM

## 2021-01-15 DIAGNOSIS — L81.8 HEMOSIDERIN PIGMENTATION OF LOWER EXTREMITY DUE TO VARICOSE VEINS: ICD-10-CM

## 2021-01-15 DIAGNOSIS — R23.3 PETECHIAE: ICD-10-CM

## 2021-01-15 DIAGNOSIS — M79.671 PAIN IN BOTH FEET: Primary | ICD-10-CM

## 2021-01-15 DIAGNOSIS — G62.9 NEUROPATHY: ICD-10-CM

## 2021-01-15 ASSESSMENT — ENCOUNTER SYMPTOMS
NAUSEA: 0
SHORTNESS OF BREATH: 0
DIARRHEA: 0
BACK PAIN: 1
CONSTIPATION: 0

## 2021-01-15 NOTE — PROGRESS NOTES
Annabel Nicole  (1945)    1/15/21    Subjective     Annabel Nicole is 66 y.o. male who complains today of:    Chief Complaint   Patient presents with    Numbness     both feet    Bleeding/Bruising     discoloration of left foot       HPI: The patient presents with a complaint of a \"bruise\" to the top of the left foot, he points to the area at the base of the central digits. He states that the discoloration has been present for at least a year. The area is purple and brown. He denies trauma. He does report a history of leg swelling, neuropathy, and takes aspirin chronically. He describes a dull pain that he rates at 1/10, the pain is exacerbated by the position of the foot and alleviated by mobving the foot. He admits to paresthesia to the feet. He denies a history of diabetes. Review of Systems   Constitutional: Negative for fever. HENT: Negative for hearing loss. Respiratory: Negative for shortness of breath. Gastrointestinal: Negative for constipation, diarrhea and nausea. Genitourinary: Negative for difficulty urinating. Musculoskeletal: Positive for back pain. Negative for neck pain. Skin: Negative for rash. Neurological: Negative for headaches. Hematological: Does not bruise/bleed easily. Psychiatric/Behavioral: Negative for sleep disturbance. He has not tried physical therapy. Date of last of last PT treatment: none    The patient is not a diabetic.        Allergies:  Seasonal and Statins    Current Outpatient Medications on File Prior to Visit   Medication Sig Dispense Refill    naproxen (NAPROSYN) 500 MG tablet Take 1 tablet by mouth 2 times daily as needed for Pain 60 tablet 5    tamsulosin (FLOMAX) 0.4 MG capsule Take 0.4 mg by mouth daily      montelukast (SINGULAIR) 10 MG tablet Take 10 mg by mouth nightly      triamterene-hydrochlorothiazide (MAXZIDE-25) 37.5-25 MG per tablet Take 1 tablet by mouth daily 90 tablet 3  gabapentin (NEURONTIN) 300 MG capsule TAKE 2 CAPSULES BY MOUTH TWICE DAILY  1    metoprolol succinate (TOPROL XL) 25 MG extended release tablet take 1 tablet daily  3    folic acid (FOLVITE) 1 MG tablet Take 1 mg by mouth daily      Nebulizer MISC by Does not apply route      ipratropium-albuterol (DUONEB) 0.5-2.5 (3) MG/3ML SOLN nebulizer solution Inhale 1 vial into the lungs every 4 hours      Misc Natural Products (PROSTATE SUPPORT PO) Take by mouth 2 times daily      vitamin D-3 (CHOLECALCIFEROL) 5000 UNITS TABS Take 5,000 Units by mouth daily.  methotrexate (RHEUMATREX) 2.5 MG chemo tablet Take 4 tablets by mouth once a week. 16 tablet 0    aspirin 81 MG tablet Take 81 mg by mouth daily.  magnesium oxide (MAG-OX) 400 MG tablet Take 400 mg by mouth daily.          Current Facility-Administered Medications on File Prior to Visit   Medication Dose Route Frequency Provider Last Rate Last Admin    albuterol (PROVENTIL) nebulizer solution 2.5 mg  2.5 mg Nebulization Once Oleta Rasp, DO           Past Medical History:   Diagnosis Date    Arthritis     R/A    CAD (coronary artery disease)     cardiac stents x 3    Chronic back pain     COPD (chronic obstructive pulmonary disease) ([facility] [address]     past smoker > 25 yr habit / quit 25 yrs ago    COPD (chronic obstructive pulmonary disease) ([facility] [address]     Coronary artery disease     Hemorrhoids     Hyperlipidemia     past trx / cannot tolerate statins    Hypertension     meds > 10 yrs / denies TIA or stroke    Neuropathy     PMR (polymyalgia rheumatica) (HCC)     Restless leg syndrome      Past Surgical History:   Procedure Laterality Date    CARDIAC SURGERY      2-3 cardiac stents    CARPAL TUNNEL RELEASE      EYE SURGERY  right    retina hole / Phaco with IOL     AL REVISE MEDIAN N/CARPAL TUNNEL SURG Right 1/11/2018    RIGHT WRIST CARPAL TUNNEL RELEASE performed by Fabrice Oliveros MD at [address] [place] [address] [facility]      2-3 cardiac stents Social History     Socioeconomic History    Marital status:      Spouse name: Not on file    Number of children: Not on file    Years of education: Not on file    Highest education level: Not on file   Occupational History    Not on file   Social Needs    Financial resource strain: Not hard at all    Food insecurity     Worry: Never true     Inability: Never true   [employer] needs     Medical: No     Non-medical: No   Tobacco Use    Smoking status: Former Smoker     Packs/day: 1.00     Years: 25.00     Pack years: 25.00     Types: Cigarettes     Quit date: 1992     Years since quittin.9    Smokeless tobacco: Never Used   Substance and Sexual Activity    Alcohol use: Not Currently     Comment: occasional     Drug use: No    Sexual activity: Not on file   Lifestyle    Physical activity     Days per week: Not on file     Minutes per session: Not on file    Stress: Not on file   Relationships    Social connections     Talks on phone: Not on file     Gets together: Not on file     Attends Taoist service: Not on file     Active member of club or organization: Not on file     Attends meetings of clubs or organizations: Not on file     Relationship status: Not on file    Intimate partner violence     Fear of current or ex partner: Not on file     Emotionally abused: Not on file     Physically abused: Not on file     Forced sexual activity: Not on file   Other Topics Concern    Not on file   Social History Narrative    Not on file     Family History   Problem Relation Age of Onset    Heart Attack Mother     Cancer Father         [place]    Heart Disease Sister         pacemaker           Objective:   Vitals:  Temp 98 °F (36.7 °C)   Ht 6' 2\" (1.88 m)   Wt 215 lb (97.5 kg)   BMI 27.60 kg/m² Pain Score:   1    Physical Exam  Constitutional:     Well nourished and well developed. Appears neat and clean. Patient is alert, oriented x3, and in no apparent distress. Vascular:   Dorsalis pedis pulse is palpable bilateral foot. Posterior tibial pulse is palpable bilateral foot. There is 1+ pitting edema noted to the bilateral lower extremity. Capillary refill is immediate bilateral hallux. There are varicosities noted bilaterally. There are telangiectasia noted bilaterally. Skin temperature gradient is noted to be warm to warm bilaterally. There are diffuse petechiae to the bilateral lower extremity. There is a confluence of petechiae versus patch of violaceous vasculitis at the dorsum of the left foot near the base of toes 2-4. There is brown discoloration consistent with hemosiderin deposition proximal to the purple area. Hair growth is diminished bilaterally. Neurological:   Light touch sensation is intact bilaterally. Vibratory sensation is diminished bilaterally. Hot versus cold discrimination is intact bilaterally. Sharp versus dull discrimination is intact bilaterally. Patellar deep tendon reflex is graded at 0/4 bilaterally. Achilles tendon deep tendon reflex is graded at 0/4 bilaterally. The Babinski response is negative bilaterally. No ankle clonus is noted bilaterally. Dermatological:  No open lesions noted bilateral foot. The toenails are dystrophic and are well groomed bilaterally. The nail plates are yellow, thickened, are incurvated, and there is no subungual debris. Xerotic skin texture noted bilaterally. Focal hyperkeratotic lesions noted: right 1st MPJ. \"Stucco keratosis\" lesions noted bilateral LE. Normal skin turgor noted bilaterally. Webspace 1-4 is dry and intact bilateral foot. Musculoskeletal:  Rectus foot type noted bilaterally. Manual muscle strength is graded at 5/5 for all groups tested bilateral foot. Gross static deformities noted: hallux abductovlagus, tailors bunionette, 2nd - 4th digit hammertoe deformities, and adductovarus 5th toe deformities noted bilaterally. Pain or crepitus noted with active or passive range of motion of the joints of the foot or ankle: none. Decreased ankle joint dorsiflexion noted bilateral lower extremity. Psychiatric:    Judgement and insight intact. Short and long term memory intact. No evidence of depression, anxiety, or agitation. Patient is calm, cooperative, and communicative. Appropriate interactions and affect. Assessment:      Diagnosis Orders   1. Pain in both feet     2. Neuropathy     3. Petechiae     4. Vasculitis of skin     5. 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 I explained that the discoloration is likely permanent. I encouraged the patient to avoid any homeopathic products frequently advertised with internet searches as the discoloration is most likely harmless. I have instructed him to monitor the purple area for rapid enlargement, breakdown of the skin, or other changes in character. All questions were answered to the patient's satisfaction. Total time spent in patient care: 27 minutes. Level of medical decision making: low. Follow up:  Return if symptoms worsen or fail to improve. Berry Velasquez DPM      Please note that this report has been partially produced using speech recognition software which may cause errors including grammar, punctuation, and spelling or words and phrases that may seem inappropriate. If there are questions or concerns please feel free to contact me for clarification.

## 2021-01-15 NOTE — PATIENT INSTRUCTIONS
Patient Education        Neuropathic Pain: Care Instructions  Your Care Instructions     Neuropathic pain is caused by pressure on or damage to your nerves. It's often simply called nerve pain. Some people feel this type of pain all the time. For others, it comes and goes. Diabetes, shingles, or an injury can cause nerve pain. Many people say the pain feels sharp, burning, or stabbing. But some people feel it as a dull ache. In some cases, it makes your skin very sensitive. So touch, pressure, and other sensations that did not hurt before may now cause pain. It's important to know that this kind of pain is real and can affect your quality of life. It's also important to know that treatment can help. Treatment includes pain medicines, exercise, and physical therapy. Medicines can help reduce the number of pain signals that travel over the nerves. This can make the painful areas less sensitive. It can also help you sleep better and improve your mood. But medicines are only one part of successful treatment. Most people do best with more than one kind of treatment. Your doctor may recommend that you try cognitive-behavioral therapy and stress management. Or, if needed, you may decide to try to quit smoking, lower your blood pressure, or better control blood sugar. These kinds of healthy changes can also make a difference. If you feel that your treatment is not working, talk to your doctor. And be sure to tell your doctor if you think you might be depressed or anxious. These are common problems that can also be treated. Follow-up care is a key part of your treatment and safety. Be sure to make and go to all appointments, and call your doctor if you are having problems. It's also a good idea to know your test results and keep a list of the medicines you take. How can you care for yourself at home? · Be safe with medicines. Read and follow all instructions on the label. ? If the doctor gave you a prescription medicine for pain, take it as prescribed. ? If you are not taking a prescription pain medicine, ask your doctor if you can take an over-the-counter medicine. · Save hard tasks for days when you have less pain. Follow a hard task with an easy task. And remember to take breaks. · Relax, and reduce stress. You may want to try deep breathing or meditation. These can help. · Keep moving. Gentle, daily exercise can help reduce pain. Your doctor or physical therapist can tell you what type of exercise is best for you. This may include walking, swimming, and stationary biking. It may also include stretches and range-of-motion exercises. · Try heat, cold packs, and massage. · Get enough sleep. Constant pain can make you more tired. If the pain makes it hard to sleep, talk with your doctor. · Think positively. Your thoughts can affect your pain. Do fun things to distract yourself from the pain. See a movie, read a book, listen to music, or spend time with a friend. · Keep a pain diary. Try to write down how strong your pain is and what it feels like. Also try to notice and write down how your moods, thoughts, sleep, activities, and medicine affect your pain. These notes can help you and your doctor find the best ways to treat your pain. Reducing constipation caused by pain medicine  Pain medicines often cause constipation. To reduce constipation:  · Include fruits, vegetables, beans, and whole grains in your diet each day. These foods are high in fiber. · Drink plenty of fluids, enough so that your urine is light yellow or clear like water. If you have kidney, heart, or liver disease and have to limit fluids, talk with your doctor before you increase the amount of fluids you drink. · Get some exercise every day. Build up slowly to 30 to 60 minutes a day on 5 or more days of the week. · Take a fiber supplement, such as Citrucel or Metamucil, every day if needed. Read and follow all instructions on the label. · Schedule time each day for a bowel movement. Having a daily routine may help. Take your time and do not strain when having a bowel movement. · Ask your doctor about a laxative. The goal is to have one easy bowel movement every 1 to 2 days. Do not let constipation go untreated for more than 3 days. When should you call for help? Call your doctor now or seek immediate medical care if:    · You feel sad, anxious, or hopeless for more than a few days. This could mean you are depressed. Depression is common in people who have a lot of pain. But it can be treated.     · You have trouble with bowel movements, such as:  ? No bowel movement in 3 days. ? Blood in the anal area, in your stool, or on the toilet paper. ? Diarrhea for more than 24 hours. Watch closely for changes in your health, and be sure to contact your doctor if:    · Your pain is getting worse.     · You can't sleep because of pain.     · You are very worried or anxious about your pain.     · You have trouble taking your pain medicine.     · You have any concerns about your pain medicine or its side effects.     · You have vomiting or cramps for more than 2 hours. Where can you learn more? Go to https://FunnelFirepeInspireMD.Netragon. org and sign in to your [email or web address] account. Enter N145 in the [employer] box to learn more about \"Neuropathic Pain: Care Instructions. \"     If you do not have an account, please click on the \"Sign Up Now\" link. Current as of: November 20, 2019               Content Version: 12.6  © 4948-4383 [employer]. Care instructions adapted under license by [facility] ([facility]). If you have questions about a medical condition or this instruction, always ask your healthcare professional. [address] any warranty or liability for your use of this information. Patient Education        Learning About Vasculitis  What is vasculitis? Vasculitis means inflamed blood vessels. This happens when the body's own immune system attacks the blood vessels. Your immune system might be reacting to an infection or a medicine. Or you may have an immune disorder. Vasculitis causes blood vessel walls to thicken, weaken, or stretch. This makes it harder for blood to flow. And that can lead to symptoms in any parts of your body that aren't getting enough blood. There are different types of vasculitis. And different parts of the body can be affected. This includes the head, joints and muscles, the skin, and some internal organs. What can you expect when you have vasculitis? Vasculitis can cause a wide variety of symptoms. Which ones you have will depend on what blood vessels are involved and how serious the problem is. Some common symptoms are:  · Fever. · Losing weight and not feeling hungry. · General tiredness. · General aches and pains. · Pain and swelling in an arm, a leg, or some other body part where the blood vessels are inflamed. For some people, the problem is short-term. For others it is long-term, or chronic. This problem may also go away, only to come back again later. How is it treated? The main goal of treatment is to reduce inflammation in the blood vessels. Mild cases may go away on their own. Sometimes over-the-counter pain medicine helps. For more severe cases, a doctor might prescribe stronger medicine, such as a corticosteroid. Follow-up care is a key part of your treatment and safety. Be sure to make and go to all appointments, and call your doctor if you are having problems. It's also a good idea to know your test results and keep a list of the medicines you take. Where can you learn more? Go to https://chpepiceweb.[email or web address] org and sign in to your [employer] account. Enter [id number] in the [facility] box to learn more about \"Learning About Vasculitis. \"     If you do not have an account, please click on the \"Sign Up Now\" link. Current as of: June 26, 2019               Content Version: 12.6  © 5234-1357 [email or web address]. Care instructions adapted under license by [facility] ([facility]). If you have questions about a medical condition or this instruction, always ask your healthcare professional. Jeffrie Prude any warranty or liability for your use of this information.

## 2021-02-26 ENCOUNTER — NURSE ONLY (OUTPATIENT)
Dept: PRIMARY CARE CLINIC | Age: 76
End: 2021-02-26

## 2021-02-26 ENCOUNTER — VIRTUAL VISIT (OUTPATIENT)
Dept: FAMILY MEDICINE CLINIC | Age: 76
End: 2021-02-26
Payer: MEDICARE

## 2021-02-26 DIAGNOSIS — M05.9 RHEUMATOID ARTHRITIS WITH POSITIVE RHEUMATOID FACTOR, INVOLVING UNSPECIFIED SITE (HCC): ICD-10-CM

## 2021-02-26 DIAGNOSIS — I73.9 CLAUDICATION OF GLUTEAL REGION (HCC): ICD-10-CM

## 2021-02-26 DIAGNOSIS — J44.9 CHRONIC OBSTRUCTIVE PULMONARY DISEASE, UNSPECIFIED COPD TYPE (HCC): ICD-10-CM

## 2021-02-26 DIAGNOSIS — Z00.00 ROUTINE GENERAL MEDICAL EXAMINATION AT A HEALTH CARE FACILITY: Primary | ICD-10-CM

## 2021-02-26 DIAGNOSIS — J06.9 UPPER RESPIRATORY TRACT INFECTION, UNSPECIFIED TYPE: ICD-10-CM

## 2021-02-26 DIAGNOSIS — J06.9 UPPER RESPIRATORY TRACT INFECTION, UNSPECIFIED TYPE: Primary | ICD-10-CM

## 2021-02-26 PROCEDURE — 4040F PNEUMOC VAC/ADMIN/RCVD: CPT | Performed by: NURSE PRACTITIONER

## 2021-02-26 PROCEDURE — G0438 PPPS, INITIAL VISIT: HCPCS | Performed by: NURSE PRACTITIONER

## 2021-02-26 PROCEDURE — 1123F ACP DISCUSS/DSCN MKR DOCD: CPT | Performed by: NURSE PRACTITIONER

## 2021-02-26 PROCEDURE — 99442 PR PHYS/QHP TELEPHONE EVALUATION 11-20 MIN: CPT | Performed by: NURSE PRACTITIONER

## 2021-02-26 PROCEDURE — G8484 FLU IMMUNIZE NO ADMIN: HCPCS | Performed by: NURSE PRACTITIONER

## 2021-02-26 RX ORDER — HYDROCHLOROTHIAZIDE 12.5 MG/1
TABLET ORAL
COMMUNITY
Start: 2020-11-24

## 2021-02-26 RX ORDER — AMOXICILLIN AND CLAVULANATE POTASSIUM 875; 125 MG/1; MG/1
1 TABLET, FILM COATED ORAL 2 TIMES DAILY
Qty: 20 TABLET | Refills: 0 | Status: SHIPPED | OUTPATIENT
Start: 2021-02-26 | End: 2021-03-08

## 2021-02-26 ASSESSMENT — ENCOUNTER SYMPTOMS
RHINORRHEA: 1
COUGH: 1
TROUBLE SWALLOWING: 0
SHORTNESS OF BREATH: 0
SORE THROAT: 1
WHEEZING: 0
HOARSE VOICE: 0
SWOLLEN GLANDS: 0
SINUS PRESSURE: 1

## 2021-02-26 ASSESSMENT — PATIENT HEALTH QUESTIONNAIRE - PHQ9
SUM OF ALL RESPONSES TO PHQ QUESTIONS 1-9: 0
SUM OF ALL RESPONSES TO PHQ QUESTIONS 1-9: 0
2. FEELING DOWN, DEPRESSED OR HOPELESS: 0
SUM OF ALL RESPONSES TO PHQ QUESTIONS 1-9: 0
SUM OF ALL RESPONSES TO PHQ QUESTIONS 1-9: 0
1. LITTLE INTEREST OR PLEASURE IN DOING THINGS: 0
SUM OF ALL RESPONSES TO PHQ QUESTIONS 1-9: 0

## 2021-02-26 NOTE — PROGRESS NOTES
Subjective:     Patient ID: Ronan Vasquez is a 66 y.o. male who presentstoday for:  Chief Complaint   Patient presents with    Sinusitis     Possible sinus infection. Sinusitis  This is a new problem. The current episode started in the past 7 days. The problem has been waxing and waning since onset. There has been no fever. Associated symptoms include congestion, coughing, sinus pressure and a sore throat. Pertinent negatives include no chills, diaphoresis, ear pain, headaches, hoarse voice, neck pain, shortness of breath, sneezing or swollen glands. Past treatments include nothing.        Past Medical History:   Diagnosis Date    Arthritis     R/A    CAD (coronary artery disease)     cardiac stents x 3    Chronic back pain     COPD [facility]chronic obstructive pulmonary disease) ([facility])     past smoker > 25 yr habit / quit 25 yrs ago    [facility]) [facility] [address]     Coronary artery disease     Hemorrhoids     Hyperlipidemia     past trx / cannot tolerate statins    Hypertension     meds > 10 yrs / denies TIA or stroke    Neuropathy     PMR (polymyalgia rheumatica) ([facility])     Restless leg syndrome      Current Outpatient Medications on File Prior to Visit   Medication Sig Dispense Refill    hydroCHLOROthiazide (HYDRODIURIL) 12.5 MG tablet TAKE 1 TABLET BY MOUTH EVERY MORNING      naproxen (NAPROSYN) 500 MG tablet Take 1 tablet by mouth 2 times daily as needed for Pain 60 tablet 5    tamsulosin (FLOMAX) 0.4 MG capsule Take 0.4 mg by mouth daily      montelukast (SINGULAIR) 10 MG tablet Take 10 mg by mouth nightly      triamterene-hydrochlorothiazide (MAXZIDE-25) 37.5-25 MG per tablet Take 1 tablet by mouth daily 90 tablet 3    gabapentin (NEURONTIN) 300 MG capsule TAKE 2 CAPSULES BY MOUTH TWICE DAILY  1    metoprolol succinate (TOPROL XL) 25 MG extended release tablet take 1 tablet daily  3    folic acid (FOLVITE) 1 MG tablet Take 1 mg by mouth daily  Nebulizer MISC by Does not apply route      ipratropium-albuterol (DUONEB) 0.5-2.5 (3) MG/3ML SOLN nebulizer solution Inhale 1 vial into the lungs every 4 hours      Misc Natural Products (PROSTATE SUPPORT PO) Take by mouth 2 times daily      vitamin D-3 (CHOLECALCIFEROL) 5000 UNITS TABS Take 5,000 Units by mouth daily.  methotrexate (RHEUMATREX) 2.5 MG chemo tablet Take 4 tablets by mouth once a week. 16 tablet 0    aspirin 81 MG tablet Take 81 mg by mouth daily.  magnesium oxide (MAG-OX) 400 MG tablet Take 400 mg by mouth daily.          Current Facility-Administered Medications on File Prior to Visit   Medication Dose Route Frequency Provider Last Rate Last Admin    albuterol (PROVENTIL) nebulizer solution 2.5 mg  2.5 mg Nebulization Once Juliocesar Sagastume DO         Past Surgical History:   Procedure Laterality Date    CARDIAC SURGERY      2-3 cardiac stents    CARPAL TUNNEL RELEASE      EYE SURGERY  right    retina hole / Phaco with IOL     [place] REVISE MEDIAN N/CARPAL TUNNEL SURG Right 2018    RIGHT WRIST CARPAL TUNNEL RELEASE performed by Michelet Chua MD at [address] [place] [address] [facility]      2-3 cardiac stents        Family History   Problem Relation Age of Onset    Heart Attack Mother     Cancer Father         [place]    Heart Disease Sister         pacemaker     Social History     Socioeconomic History    Marital status:      Spouse name: Not on file    Number of children: Not on file    Years of education: Not on file    Highest education level: Not on file   Occupational History    Not on file   Social Needs    Financial resource strain: Not hard at all   [place] insecurity     Worry: Never true     Inability: Never true    Transportation needs     Medical: No     Non-medical: No   Tobacco Use    Smoking status: Former Smoker     Packs/day: 1.00     Years: 25.00     Pack years: 25.00     Types: Cigarettes     Quit date: 1992     Years since quittin.0  Smokeless tobacco: Never Used   Substance and Sexual Activity    Alcohol use: Not Currently     Comment: occasional     Drug use: No    Sexual activity: Not on file   Lifestyle    Physical activity     Days per week: Not on file     Minutes per session: Not on file    Stress: Not on file   Relationships    Social connections     Talks on phone: Not on file     Gets together: Not on file     Attends Oriental orthodox service: Not on file     Active member of club or organization: Not on file     Attends meetings of clubs or organizations: Not on file     Relationship status: Not on file    Intimate partner violence     Fear of current or ex partner: Not on file     Emotionally abused: Not on file     Physically abused: Not on file     Forced sexual activity: Not on file   Other Topics Concern    Not on file   Social History Narrative    Not on file     Allergies:  Seasonal and Statins    Review of Systems   Constitutional: Negative for chills, diaphoresis and fever. HENT: Positive for congestion, postnasal drip, rhinorrhea, sinus pressure and sore throat. Negative for ear pain, hoarse voice, sneezing and trouble swallowing. Respiratory: Positive for cough. Negative for shortness of breath and wheezing. Musculoskeletal: Negative for neck pain. Neurological: Negative for headaches. Objective: There were no vitals taken for this visit. Physical Exam  Constitutional:       General: He is not in acute distress. Pulmonary:      Effort: No respiratory distress. Neurological:      Mental Status: He is alert and oriented to person, place, and time. Psychiatric:         Mood and Affect: Mood normal.         Behavior: Behavior normal.         Assessment & Plan:       Diagnosis Orders   1.  Upper respiratory tract infection, unspecified type  COVID-19 Ambulatory     Orders Placed This Encounter   Procedures    COVID-19 Ambulatory     Standing Status:   Future Standing Expiration Date:   2/26/2022     Scheduling Instructions:      Saline media preferred given current shortage of viral transport media but both acceptable     Order Specific Question:   Is this test for diagnosis or screening? Answer:   Diagnosis of ill patient     Order Specific Question:   Symptomatic for COVID-19 as defined by CDC? Answer:   Yes     Order Specific Question:   Date of Symptom Onset     Answer:   2/21/2021     Order Specific Question:   Hospitalized for COVID-19? Answer:   No     Order Specific Question:   Admitted to ICU for COVID-19? Answer:   No     Order Specific Question:   Employed in healthcare setting? Answer:   No     Order Specific Question:   Resident in a congregate (group) care setting? Answer:   No     Order Specific Question:   Pregnant: Answer:   No     Order Specific Question:   Previously tested for COVID-19? Answer:   No     Orders Placed This Encounter   Medications    amoxicillin-clavulanate (AUGMENTIN) 875-125 MG per tablet     Sig: Take 1 tablet by mouth 2 times daily for 10 days     Dispense:  20 tablet     Refill:  0     There are no discontinued medications. Return if symptoms worsen or fail to improve. Garcia Ansari is a 66 y.o. male evaluated via telephone on 2/26/2021. Consent:  He and/or health care decision maker is aware that that he may receive a bill for this telephone service, depending on his insurance coverage, and has provided verbal consent to proceed: Yes      This visit was a telephone encounter. Patient was located at their home. Provider was located at their _x__ home or        ____ office. I affirm this is a Patient Initiated Episode with an Established Patient who has not had a related appointment within my department in the past 7 days or scheduled within the next 24 hours.     Total Time: minutes: 11-20 minutes Note: not billable if this call serves to triage the patient into an appointment for the relevant concern      Andi Burgos     Reviewed with the patient: currentclinical status, medications, activities and diet. Side effects, adverse effects of the medicationprescribed today, as well as treatment plan/ rationale and result expectations havebeen discussed with the patient who expresses understanding and desires to proceed. Pt instructions reviewed and given to patient.     Close follow up to evaluate treatment resultsand for coordination of care. I have reviewed the patient's medical historyin detail and updated the computerized patient record.     Andi Burgos, APRN - CNP

## 2021-02-26 NOTE — PATIENT INSTRUCTIONS
Personalized Preventive Plan for Myron Goodson - 2/26/2021  Medicare offers a range of preventive health benefits. Some of the tests and screenings are paid in full while other may be subject to a deductible, co-insurance, and/or copay. Some of these benefits include a comprehensive review of your medical history including lifestyle, illnesses that may run in your family, and various assessments and screenings as appropriate. After reviewing your medical record and screening and assessments performed today your provider may have ordered immunizations, labs, imaging, and/or referrals for you. A list of these orders (if applicable) as well as your Preventive Care list are included within your After Visit Summary for your review. Other Preventive Recommendations:    · A preventive eye exam performed by an eye specialist is recommended every 1-2 years to screen for glaucoma; cataracts, macular degeneration, and other eye disorders. · A preventive dental visit is recommended every 6 months. · Try to get at least 150 minutes of exercise per week or 10,000 steps per day on a pedometer . · Order or download the FREE \"Exercise & Physical Activity: Your Everyday Guide\" from [employer]. Call 0-851.315.9438 or search [employer] online. · You need 7782-1241 mg of calcium and 8005-2508 IU of vitamin D per day. It is possible to meet your calcium requirement with diet alone, but a vitamin D supplement is usually necessary to meet this goal.  · When exposed to the sun, use a sunscreen that protects against both UVA and UVB radiation with an SPF of 30 or greater. Reapply every 2 to 3 hours or after sweating, drying off with a towel, or swimming. · Always wear a seat belt when traveling in a car. Always wear a helmet when riding a bicycle or motorcycle.

## 2021-02-26 NOTE — PROGRESS NOTES
Medicare Annual Wellness Visit  Are Name: Selma Ryder Date: 2021   MRN: [id number] Sex: Male   Age: 66 y.o. Ethnicity: Non-/Non    : 1945 Race: Law Polanco is here for Medicare AWV    Screenings for behavioral, psychosocial and functional/safety risks, and cognitive dysfunction are all negative except as indicated below. These results, as well as other patient data from the [address] form, are documented in Flowsheets linked to this Encounter. Allergies   Allergen Reactions    Seasonal      Sinus congestion    Statins      MYALGIAS         Prior to Visit Medications    Medication Sig Taking?  Authorizing Provider   hydroCHLOROthiazide (HYDRODIURIL) 12.5 MG tablet TAKE 1 TABLET BY MOUTH EVERY MORNING Yes Historical Provider, MD   naproxen (NAPROSYN) 500 MG tablet Take 1 tablet by mouth 2 times daily as needed for Pain Yes Kip Mccabe MD   tamsulosin (FLOMAX) 0.4 MG capsule Take 0.4 mg by mouth daily Yes Historical Provider, MD   montelukast (SINGULAIR) 10 MG tablet Take 10 mg by mouth nightly Yes Historical Provider, MD   triamterene-hydrochlorothiazide (MAXZIDE-25) 37.5-25 MG per tablet Take 1 tablet by mouth daily Yes Kay Key MD   gabapentin (NEURONTIN) 300 MG capsule TAKE 2 CAPSULES BY MOUTH TWICE DAILY Yes Historical Provider, MD   metoprolol succinate (TOPROL XL) 25 MG extended release tablet take 1 tablet daily Yes Historical Provider, MD   folic acid (FOLVITE) 1 MG tablet Take 1 mg by mouth daily Yes Historical Provider, MD   Nebulizer MISC by Does not apply route Yes Historical Provider, MD   ipratropium-albuterol (DUONEB) 0.5-2.5 (3) MG/3ML SOLN nebulizer solution Inhale 1 vial into the lungs every 4 hours Yes Historical Provider, MD   Misc Natural Products (PROSTATE SUPPORT PO) Take by mouth 2 times daily Yes Historical Provider, MD vitamin D-3 (CHOLECALCIFEROL) 5000 UNITS TABS Take 5,000 Units by mouth daily. Yes Historical Provider, MD   methotrexate (RHEUMATREX) 2.5 MG chemo tablet Take 4 tablets by mouth once a week. Yes Cayden Jackson MD   aspirin 81 MG tablet Take 81 mg by mouth daily. Yes Historical Provider, MD   magnesium oxide (MAG-OX) 400 MG tablet Take 400 mg by mouth daily.    Yes Historical Provider, MD   amoxicillin-clavulanate (AUGMENTIN) 875-125 MG per tablet Take 1 tablet by mouth 2 times daily for 10 days  Roosevelt Espinoza, APRN - CNP         Past Medical History:   Diagnosis Date    Arthritis     R/A    CAD (coronary artery disease)     cardiac stents x 3    Chronic back pain     COPD [facility]ch[facility] obstructive pulmonary disease) ([facility]     past smoker > 25 yr habit / quit 25 yrs ago    COPD (chronic obstructive pulmonary disease) ([facility]     Coronary artery disease     Hemorrhoids     Hyperlipidemia     past trx / cannot tolerate statins    Hypertension     meds > 10 yrs / denies TIA or stroke    Neuropathy     [facility]     Restless leg syndrome        Past Surgical History:   Procedure Laterality Date    CARDIAC SURGERY      2-3 cardiac stents    CARPAL TUNNEL RELEASE      EYE SURGERY  right    retina hole / Phaco with IOL     [place] REVISE MEDIAN N/CARPAL TUNNEL SURG Right 1/11/2018    RIGHT WRIST CARPAL TUNNEL RELEASE performed by Bela Espinoza MD at [address] [place] [address] [facility]      2-3 cardiac stents         Family History   Problem Relation Age of Onset    Heart Attack Mother     Cancer Father         LYMPHOMA    Heart Disease Sister         pacemaker       CareTeam (Including outside providers/suppliers regularly involved in providing care):   Patient Care Team:  Naya Win MD as PCP - General (Family Medicine)  Naya Win MD as PCP - [facility] Empaneled Provider    Wt Readings from Last 3 Encounters:   02/11/21 225 lb (102.1 kg)   01/15/21 215 lb (97.5 kg)   12/08/20 210 lb (95.3 kg) No flowsheet data found. There is no height or weight on file to calculate BMI. Based upon direct observation of the patient, evaluation of cognition reveals recent and remote memory intact. Patient's complete Health Risk Assessment and screening values have been reviewed and are found in Flowsheets. The following problems were reviewed today and where indicated follow up appointments were made and/or referrals ordered. Positive Risk Factor Screenings with Interventions:     Fall Risk:  2 or more falls in past year?: (!) yes  Fall with injury in past year?: no  Fall Risk Interventions:    · Patient declines any further evaluation/treatment for this issue          General Health and ACP:  General  In general, how would you say your health is?: Good  In the past 7 days, have you experienced any of the following?  New or Increased Pain, New or Increased Fatigue, Loneliness, Social Isolation, Stress or Anger?: (!) New or Increased Fatigue  Do you get the social and emotional support that you need?: (!) No  Do you have a Living Will?: Yes  Advance Directives     Power of  Living Will ACP-Advance Directive ACP-Power of     Not on File Not on File Not on File Not on File      General Health Risk Interventions:  · Fatigue: patient will f/u after he completes antibitoics for fatigue and lab work    Health Habits/Nutrition:  Health Habits/Nutrition  Do you exercise for at least 20 minutes 2-3 times per week?: Yes  Have you lost any weight without trying in the past 3 months?: No  Do you eat only one meal per day?: No  Have you seen the dentist within the past year?: (!) No     Health Habits/Nutrition Interventions:  · Dental exam overdue:  patient encouraged to make appointment with his/her dentist       Personalized Preventive Plan   Current Health Maintenance Status  Immunization History   Administered Date(s) Administered    Hepatitis B 11/30/1999, 05/17/2000  Influenza Virus Vaccine 12/29/2014, 11/01/2020    Influenza, Quadv, adjuvanted, 65 yrs +, IM, PF (Fluad) 10/16/2020    Pneumococcal Conjugate 13-valent ([id number]) 06/20/2018    Pneumococcal Polysaccharide ([id number]) 07/25/2019    Td vaccine (adult) 05/17/2000        Health Maintenance   Topic Date Due    Hepatitis C screen  1945    COVID-19 Vaccine (1 of 2) 06/27/1959    DTaP/Tdap/Td vaccine (1 - Tdap) 06/27/1962    Annual Wellness Visit (AWV)  10/27/2020    Shingles Vaccine (1 of 2) 10/01/2025 (Originally 6/27/1993)    Potassium monitoring  10/16/2021    Creatinine monitoring  10/16/2021    PSA counseling  10/16/2021    Flu vaccine  Completed    Pneumococcal 65+ years Vaccine  Completed    Hepatitis A vaccine  Aged Out    Hepatitis B vaccine  Aged Out    Hib vaccine  Aged Out    Meningococcal (ACWY) vaccine  Aged Out     Recommendations for [place] Due: see orders and patient instructions/AVS.  . Recommended screening schedule for the next 5-10 years is provided to the patient in written form: see Patient Instructions/AVS.    Philip Campos was seen today for medicare awv. Diagnoses and all orders for this visit:    Routine general medical examination at a health care facility    Rheumatoid arthritis with positive rheumatoid factor, involving unspecified site [facility])    Chronic obstructive pulmonary disease, unspecified COPD type ([facility]    Claudication of gluteal region [facility])             Chronic conditions stable at this time. Has routine f/u with PCP. Brad Bo is a 66 y.o. male being evaluated by a Virtual Visit (phone) encounter to address concerns as mentioned above. A caregiver was present when appropriate. Due to this being a TeleHealth encounter (During [facility] public health emergency), evaluation of the following organ systems was limited: Vitals/Constitutional/EENT/Resp/CV/GI//MS/Neuro/Skin/Heme-Lymph-Imm. Pursuant to the emergency declaration under the [address] [place] [address], [address] [place] [address] and the [employer] Act, this Virtual Visit was conducted with patient's (and/or legal guardian's) consent, to reduce the patient's risk of exposure to COVID-19 and provide necessary medical care. The patient (and/or legal guardian) has also been advised to contact this office for worsening conditions or problems, and seek emergency medical treatment and/or call 911 if deemed necessary. Patient identification was verified at the start of the visit: Yes    Services were provided through phone to substitute for in-person clinic visit. Patient and provider were located at their individual homes. --JENNIFER Funk - CNP on 2/26/2021 at 9:51 AM    An electronic signature was used to authenticate this note.

## 2021-02-27 LAB
SARS-COV-2: NOT DETECTED
SOURCE: NORMAL

## 2021-03-29 ENCOUNTER — OFFICE VISIT (OUTPATIENT)
Dept: FAMILY MEDICINE CLINIC | Age: 76
End: 2021-03-29
Payer: MEDICARE

## 2021-03-29 VITALS
TEMPERATURE: 97.8 F | HEART RATE: 60 BPM | DIASTOLIC BLOOD PRESSURE: 64 MMHG | WEIGHT: 221.4 LBS | HEIGHT: 74 IN | SYSTOLIC BLOOD PRESSURE: 114 MMHG | OXYGEN SATURATION: 95 % | BODY MASS INDEX: 28.42 KG/M2

## 2021-03-29 DIAGNOSIS — R53.83 FATIGUE, UNSPECIFIED TYPE: ICD-10-CM

## 2021-03-29 DIAGNOSIS — J44.9 CHRONIC OBSTRUCTIVE PULMONARY DISEASE, UNSPECIFIED COPD TYPE (HCC): ICD-10-CM

## 2021-03-29 DIAGNOSIS — Z91.81 AT HIGH RISK FOR FALLS: ICD-10-CM

## 2021-03-29 DIAGNOSIS — I10 ESSENTIAL HYPERTENSION: ICD-10-CM

## 2021-03-29 DIAGNOSIS — R53.83 FATIGUE, UNSPECIFIED TYPE: Primary | ICD-10-CM

## 2021-03-29 DIAGNOSIS — R07.89 CHEST WALL PAIN: ICD-10-CM

## 2021-03-29 LAB
ALBUMIN SERPL-MCNC: 3.8 G/DL (ref 3.5–4.6)
ALP BLD-CCNC: 47 U/L (ref 35–104)
ALT SERPL-CCNC: 21 U/L (ref 0–41)
ANION GAP SERPL CALCULATED.3IONS-SCNC: 11 MEQ/L (ref 9–15)
AST SERPL-CCNC: 28 U/L (ref 0–40)
BASOPHILS ABSOLUTE: 0.1 K/UL (ref 0–0.2)
BASOPHILS RELATIVE PERCENT: 0.7 %
BILIRUB SERPL-MCNC: <0.2 MG/DL (ref 0.2–0.7)
BUN BLDV-MCNC: 29 MG/DL (ref 8–23)
CALCIUM SERPL-MCNC: 9.8 MG/DL (ref 8.5–9.9)
CHLORIDE BLD-SCNC: 106 MEQ/L (ref 95–107)
CO2: 25 MEQ/L (ref 20–31)
CREAT SERPL-MCNC: 1.68 MG/DL (ref 0.7–1.2)
EOSINOPHILS ABSOLUTE: 1 K/UL (ref 0–0.7)
EOSINOPHILS RELATIVE PERCENT: 11.3 %
GFR AFRICAN AMERICAN: 48.1
GFR NON-AFRICAN AMERICAN: 39.7
GLOBULIN: 2.8 G/DL (ref 2.3–3.5)
GLUCOSE BLD-MCNC: 102 MG/DL (ref 70–99)
HCT VFR BLD CALC: 46.3 % (ref 42–52)
HEMOGLOBIN: 15.4 G/DL (ref 14–18)
LYMPHOCYTES ABSOLUTE: 1.7 K/UL (ref 1–4.8)
LYMPHOCYTES RELATIVE PERCENT: 20.4 %
MCH RBC QN AUTO: 32.1 PG (ref 27–31.3)
MCHC RBC AUTO-ENTMCNC: 33.2 % (ref 33–37)
MCV RBC AUTO: 96.6 FL (ref 80–100)
MONOCYTES ABSOLUTE: 0.6 K/UL (ref 0.2–0.8)
MONOCYTES RELATIVE PERCENT: 7.7 %
NEUTROPHILS ABSOLUTE: 5 K/UL (ref 1.4–6.5)
NEUTROPHILS RELATIVE PERCENT: 59.9 %
PDW BLD-RTO: 14.3 % (ref 11.5–14.5)
PLATELET # BLD: 222 K/UL (ref 130–400)
POTASSIUM SERPL-SCNC: 4.4 MEQ/L (ref 3.4–4.9)
RBC # BLD: 4.79 M/UL (ref 4.7–6.1)
SEDIMENTATION RATE, ERYTHROCYTE: 15 MM (ref 0–20)
SODIUM BLD-SCNC: 142 MEQ/L (ref 135–144)
TOTAL PROTEIN: 6.6 G/DL (ref 6.3–8)
TSH SERPL DL<=0.05 MIU/L-ACNC: 1.86 UIU/ML (ref 0.44–3.86)
WBC # BLD: 8.4 K/UL (ref 4.8–10.8)

## 2021-03-29 PROCEDURE — G8484 FLU IMMUNIZE NO ADMIN: HCPCS | Performed by: FAMILY MEDICINE

## 2021-03-29 PROCEDURE — 4040F PNEUMOC VAC/ADMIN/RCVD: CPT | Performed by: FAMILY MEDICINE

## 2021-03-29 PROCEDURE — G8427 DOCREV CUR MEDS BY ELIG CLIN: HCPCS | Performed by: FAMILY MEDICINE

## 2021-03-29 PROCEDURE — 99213 OFFICE O/P EST LOW 20 MIN: CPT | Performed by: FAMILY MEDICINE

## 2021-03-29 PROCEDURE — 1123F ACP DISCUSS/DSCN MKR DOCD: CPT | Performed by: FAMILY MEDICINE

## 2021-03-29 PROCEDURE — 3023F SPIROM DOC REV: CPT | Performed by: FAMILY MEDICINE

## 2021-03-29 PROCEDURE — 1036F TOBACCO NON-USER: CPT | Performed by: FAMILY MEDICINE

## 2021-03-29 PROCEDURE — G8926 SPIRO NO PERF OR DOC: HCPCS | Performed by: FAMILY MEDICINE

## 2021-03-29 PROCEDURE — G8417 CALC BMI ABV UP PARAM F/U: HCPCS | Performed by: FAMILY MEDICINE

## 2021-03-29 ASSESSMENT — ENCOUNTER SYMPTOMS
EYES NEGATIVE: 1
RESPIRATORY NEGATIVE: 1
GASTROINTESTINAL NEGATIVE: 1
ALLERGIC/IMMUNOLOGIC NEGATIVE: 1
BACK PAIN: 1
SHORTNESS OF BREATH: 0

## 2021-03-29 NOTE — PROGRESS NOTES
(benign prostatic hyperplasia) 07/10/2012    Neuropathy 07/10/2012    Eczema 01/10/2012    Rheumatoid arthritis ([facility] 01/10/2012    CAD (coronary artery disease)     Hypertension      Past Surgical History:   Procedure Laterality Date    CARDIAC SURGERY      2-3 cardiac stents    CARPAL TUNNEL RELEASE      EYE SURGERY  right    retina hole / Phaco with IOL     [place] REVISE MEDIAN N/CARPAL TUNNEL SURG Right 2018    RIGHT WRIST CARPAL TUNNEL RELEASE performed by Jose Miguel Michael MD at [address] [place] [address] [facility]      2-3 cardiac stents     Family History   Problem Relation Age of Onset    Heart Attack Mother     Cancer Father         LYMPHOMA    Heart Disease Sister         pacemaker     Social History     Socioeconomic History    Marital status:      Spouse name: None    Number of children: None    Years of education: None    Highest education level: None   Occupational History    None   Social Needs    Financial resource strain: Not hard at all   [place] insecurity     Worry: Never true     Inability: Never true    Transportation needs     Medical: No     Non-medical: No   Tobacco Use    Smoking status: Former Smoker     Packs/day: 1.00     Years: 25.00     Pack years: 25.00     Types: Cigarettes     Quit date: 1992     Years since quittin.1    Smokeless tobacco: Never Used   Substance and Sexual Activity    Alcohol use: Not Currently     Comment: occasional     Drug use: No    Sexual activity: None   Lifestyle    Physical activity     Days per week: None     Minutes per session: None    Stress: None   Relationships    Social connections     Talks on phone: None     Gets together: None     Attends Taoism service: None     Active member of club or organization: None     Attends meetings of clubs or organizations: None     Relationship status: None    Intimate partner violence     Fear of current or ex partner: None     Emotionally abused: None     Physically abused: None Forced sexual activity: None   Other Topics Concern    None   Social History Narrative    None     Current Outpatient Medications   Medication Sig Dispense Refill    hydroCHLOROthiazide (HYDRODIURIL) 12.5 MG tablet TAKE 1 TABLET BY MOUTH EVERY MORNING      montelukast (SINGULAIR) 10 MG tablet Take 10 mg by mouth nightly      gabapentin (NEURONTIN) 300 MG capsule TAKE 2 CAPSULES BY MOUTH TWICE DAILY  1    metoprolol succinate (TOPROL XL) 25 MG extended release tablet take 1 tablet daily  3    folic acid (FOLVITE) 1 MG tablet Take 1 mg by mouth daily      Nebulizer MISC by Does not apply route      ipratropium-albuterol (DUONEB) 0.5-2.5 (3) MG/3ML SOLN nebulizer solution Inhale 1 vial into the lungs every 4 hours      Misc Natural Products (PROSTATE SUPPORT PO) Take by mouth 2 times daily      vitamin D-3 (CHOLECALCIFEROL) 5000 UNITS TABS Take 5,000 Units by mouth daily.  methotrexate (RHEUMATREX) 2.5 MG chemo tablet Take 4 tablets by mouth once a week. 16 tablet 0    aspirin 81 MG tablet Take 81 mg by mouth daily.  magnesium oxide (MAG-OX) 400 MG tablet Take 400 mg by mouth daily.          Current Facility-Administered Medications   Medication Dose Route Frequency Provider Last Rate Last Admin    albuterol (PROVENTIL) nebulizer solution 2.5 mg  2.5 mg Nebulization Once Dax Amaro, DO         Current Outpatient Medications on File Prior to Visit   Medication Sig Dispense Refill    hydroCHLOROthiazide (HYDRODIURIL) 12.5 MG tablet TAKE 1 TABLET BY MOUTH EVERY MORNING      montelukast (SINGULAIR) 10 MG tablet Take 10 mg by mouth nightly      gabapentin (NEURONTIN) 300 MG capsule TAKE 2 CAPSULES BY MOUTH TWICE DAILY  1    metoprolol succinate (TOPROL XL) 25 MG extended release tablet take 1 tablet daily  3    folic acid (FOLVITE) 1 MG tablet Take 1 mg by mouth daily      Nebulizer MISC by Does not apply route      ipratropium-albuterol (DUONEB) 0.5-2.5 (3) MG/3ML SOLN nebulizer solution Inhale 1 vial into the lungs every 4 hours      Misc Natural Products (PROSTATE SUPPORT PO) Take by mouth 2 times daily      vitamin D-3 (CHOLECALCIFEROL) 5000 UNITS TABS Take 5,000 Units by mouth daily.  methotrexate (RHEUMATREX) 2.5 MG chemo tablet Take 4 tablets by mouth once a week. 16 tablet 0    aspirin 81 MG tablet Take 81 mg by mouth daily.  magnesium oxide (MAG-OX) 400 MG tablet Take 400 mg by mouth daily. Current Facility-Administered Medications on File Prior to Visit   Medication Dose Route Frequency Provider Last Rate Last Admin    albuterol (PROVENTIL) nebulizer solution 2.5 mg  2.5 mg Nebulization Once Oleta Rasp, DO         Allergies   Allergen Reactions    Seasonal      Sinus congestion    Statins      MYALGIAS     Health Maintenance   Topic Date Due    Hepatitis C screen  Never done    COVID-19 Vaccine (1) Never done    DTaP/Tdap/Td vaccine (1 - Tdap) 06/27/1962    Shingles Vaccine (1 of 2) 10/01/2025 (Originally 6/27/1993)    Potassium monitoring  10/16/2021    Creatinine monitoring  10/16/2021    PSA counseling  10/16/2021    Annual Wellness Visit (AWV)  02/27/2022    Flu vaccine  Completed    Pneumococcal 65+ years Vaccine  Completed    Hepatitis A vaccine  Aged Out    Hepatitis B vaccine  Aged Out    Hib vaccine  Aged Out    Meningococcal (ACWY) vaccine  Aged Out       Review of Systems     Review of Systems   Constitutional: Negative for activity change, appetite change, chills, fever and unexpected weight change. HENT: Negative. Eyes: Negative. Respiratory: Negative. Negative for shortness of breath. Cardiovascular: Negative. Negative for chest pain and palpitations. Gastrointestinal: Negative. Endocrine: Negative. Genitourinary: Negative. Musculoskeletal: Positive for back pain. Skin: Negative. Allergic/Immunologic: Negative. Neurological: Negative. Hematological: Negative.     Psychiatric/Behavioral: Negative. Physical Exam  Vitals:    03/29/21 0938   BP: 114/64   Site: Left Upper Arm   Position: Sitting   Cuff Size: Medium Adult   Pulse: 60   Temp: 97.8 °F (36.6 °C)   TempSrc: Oral   SpO2: 95%   Weight: 221 lb 6.4 oz (100.4 kg)   Height: 6' 2\" (1.88 m)       Physical Exam  Constitutional:       Appearance: He is well-developed. HENT:      Right Ear: External ear normal.      Left Ear: External ear normal.   Eyes:      Conjunctiva/sclera: Conjunctivae normal.      Pupils: Pupils are equal, round, and reactive to light. Neck:      Musculoskeletal: Normal range of motion and neck supple. Thyroid: No thyromegaly. Cardiovascular:      Rate and Rhythm: Normal rate and regular rhythm. Heart sounds: Normal heart sounds. No murmur. No friction rub. No gallop. Pulmonary:      Effort: Pulmonary effort is normal. No respiratory distress. Breath sounds: Normal breath sounds. No wheezing. Abdominal:      General: Bowel sounds are normal. There is no distension. Palpations: Abdomen is soft. There is no mass. Tenderness: There is no abdominal tenderness. There is no guarding or rebound. Hernia: No hernia is present. Genitourinary:     Penis: Normal.    Musculoskeletal: Normal range of motion. General: No tenderness. Lymphadenopathy:      Cervical: No cervical adenopathy. Skin:     General: Skin is warm and dry. Neurological:      Mental Status: He is alert and oriented to person, place, and time. Cranial Nerves: No cranial nerve deficit. Coordination: Coordination normal.         Assessment   Diagnosis Orders   1. Fatigue, unspecified type  CBC Auto Differential    Comprehensive Metabolic Panel    TSH without Reflex    Sedimentation Rate   2. At high risk for falls     3. Chronic obstructive pulmonary disease, unspecified COPD type ([facility]     4. Essential hypertension     5.  Chest wall pain  XR CHEST (2 VW)     Problem List     Hypertension    Chronic obstructive pulmonary disease (HCC)    Relevant Medications    albuterol (PROVENTIL) nebulizer solution 2.5 mg    ipratropium-albuterol (DUONEB) 0.5-2.5 (3) MG/3ML SOLN nebulizer solution    montelukast (SINGULAIR) 10 MG tablet          Plan  Orders Placed This Encounter   Procedures    XR CHEST (2 VW)     Standing Status:   Future     Number of Occurrences:   1     Standing Expiration Date:   3/29/2022    CBC Auto Differential     Standing Status:   Future     Number of Occurrences:   1     Standing Expiration Date:   3/29/2022    Comprehensive Metabolic Panel     Standing Status:   Future     Number of Occurrences:   1     Standing Expiration Date:   3/29/2022    TSH without Reflex     Standing Status:   Future     Number of Occurrences:   1     Standing Expiration Date:   3/29/2022    Sedimentation Rate     Standing Status:   Future     Number of Occurrences:   1     Standing Expiration Date:   3/29/2022     No orders of the defined types were placed in this encounter. No follow-ups on file. Kay Key MD        On the basis of positive falls risk screening, assessment and plan is as follows: home safety tips provided.

## 2021-04-08 ENCOUNTER — TELEPHONE (OUTPATIENT)
Dept: FAMILY MEDICINE CLINIC | Age: 76
End: 2021-04-08

## 2021-04-08 DIAGNOSIS — R53.83 FATIGUE, UNSPECIFIED TYPE: Primary | ICD-10-CM

## 2021-04-08 DIAGNOSIS — N18.31 STAGE 3A CHRONIC KIDNEY DISEASE (HCC): ICD-10-CM

## 2021-04-08 NOTE — TELEPHONE ENCOUNTER
Ronaldo Mcadams is asking if there is something else that can be tested for the fatigue? The blood work was fine. Please advise.

## 2021-04-19 ENCOUNTER — TELEPHONE (OUTPATIENT)
Dept: FAMILY MEDICINE CLINIC | Age: 76
End: 2021-04-19

## 2021-04-19 DIAGNOSIS — N18.31 STAGE 3A CHRONIC KIDNEY DISEASE (HCC): ICD-10-CM

## 2021-04-19 DIAGNOSIS — R53.83 FATIGUE, UNSPECIFIED TYPE: ICD-10-CM

## 2021-04-19 LAB — VITAMIN D 25-HYDROXY: 70.4 NG/ML (ref 30–100)

## 2021-04-19 NOTE — TELEPHONE ENCOUNTER
Liane Prieto been having extreme fatigue. I see that he was seen 3/29/21 for fatigue. He has gotten worse. He has been staying in bed the better part of 3 days last week. Cristóbal Morris is concerned. Please advise. I noted that you ordered the AMISHA and Vit D today and I let her know.

## 2021-04-22 LAB — ANA IGG, ELISA: NORMAL

## 2021-04-26 ENCOUNTER — OFFICE VISIT (OUTPATIENT)
Dept: FAMILY MEDICINE CLINIC | Age: 76
End: 2021-04-26
Payer: MEDICARE

## 2021-04-26 VITALS
BODY MASS INDEX: 27.59 KG/M2 | DIASTOLIC BLOOD PRESSURE: 88 MMHG | WEIGHT: 215 LBS | SYSTOLIC BLOOD PRESSURE: 128 MMHG | HEIGHT: 74 IN | OXYGEN SATURATION: 98 % | HEART RATE: 63 BPM | TEMPERATURE: 98.4 F

## 2021-04-26 DIAGNOSIS — M48.061 SPINAL STENOSIS OF LUMBAR REGION, UNSPECIFIED WHETHER NEUROGENIC CLAUDICATION PRESENT: ICD-10-CM

## 2021-04-26 DIAGNOSIS — I10 ESSENTIAL HYPERTENSION: Primary | ICD-10-CM

## 2021-04-26 DIAGNOSIS — N18.31 STAGE 3A CHRONIC KIDNEY DISEASE (HCC): ICD-10-CM

## 2021-04-26 PROCEDURE — 1036F TOBACCO NON-USER: CPT | Performed by: FAMILY MEDICINE

## 2021-04-26 PROCEDURE — 99213 OFFICE O/P EST LOW 20 MIN: CPT | Performed by: FAMILY MEDICINE

## 2021-04-26 PROCEDURE — G8417 CALC BMI ABV UP PARAM F/U: HCPCS | Performed by: FAMILY MEDICINE

## 2021-04-26 PROCEDURE — G8427 DOCREV CUR MEDS BY ELIG CLIN: HCPCS | Performed by: FAMILY MEDICINE

## 2021-04-26 PROCEDURE — 4040F PNEUMOC VAC/ADMIN/RCVD: CPT | Performed by: FAMILY MEDICINE

## 2021-04-26 PROCEDURE — 1123F ACP DISCUSS/DSCN MKR DOCD: CPT | Performed by: FAMILY MEDICINE

## 2021-04-26 ASSESSMENT — ENCOUNTER SYMPTOMS
RESPIRATORY NEGATIVE: 1
ALLERGIC/IMMUNOLOGIC NEGATIVE: 1
EYES NEGATIVE: 1
SHORTNESS OF BREATH: 0
ABDOMINAL PAIN: 1

## 2021-04-26 NOTE — PROGRESS NOTES
Patient is seen in follow up for   Chief Complaint   Patient presents with    Fatigue     Feeling fatigue all the time and noticed it a year ago but it's getting worse.  Dizziness     Feeling dizzy and is seeing fuzzy. If he turns around , he becomes dizzy/ It has been going on for a year/    Back Pain     Patient feels pain under his rib cage that travels to his back. Pain feels like an ache. It has gotten better after not taking Neurotin as it caused gas and bloatness.  Other     Patient states he lost taste and smell for a year or more. He says food taste different for him and symptoms are \"just bad\".  Advance Care Planning     Disucssed HCDM     HPIhere with fatigue not getting better.  Labs reviewed and normal.    Past Medical History:   Diagnosis Date    Arthritis     R/A    CAD (coronary artery disease)     cardiac stents x 3    Chronic back pain     COPD [facility]ch[facility] obstructive pulmonary disease) ([facility])     past smoker > 25 yr habit / quit 25 yrs ago    COPD [facility]) [facility] [address]     Coronary artery disease     Hemorrhoids     Hyperlipidemia     past trx / cannot tolerate statins    Hypertension     meds > 10 yrs / denies TIA or stroke    Neuropathy     PMR (polymyalgia rheumatica) ([facility])     Restless leg syndrome      Patient Active Problem List    Diagnosis Date Noted    Chronic obstructive pulmonary disease ([facility] [address] 02/26/2021    Spinal stenosis of lumbar region with neurogenic claudication 12/08/2020    Adhesive capsulitis of right shoulder 12/08/2020    RLS (restless legs syndrome) 01/03/2018    Carpal tunnel syndrome of right wrist 01/03/2018    Former smoker, stopped smoking in distant past 01/03/2018    Elevated PSA 06/19/2014    CKD (chronic kidney disease) stage 3, GFR 30-59 ml/min 05/29/2014    Chronic back pain 05/29/2014    Statin intolerance 12/05/2013    Claudication of gluteal region ([facility] [address] 12/05/2013    Trigger index finger of left hand Active member of club or organization: Not on file     Attends meetings of clubs or organizations: Not on file     Relationship status: Not on file    Intimate partner violence     Fear of current or ex partner: Not on file     Emotionally abused: Not on file     Physically abused: Not on file     Forced sexual activity: Not on file   Other Topics Concern    Not on file   Social History Narrative    Not on file     Current Outpatient Medications   Medication Sig Dispense Refill    hydroCHLOROthiazide (HYDRODIURIL) 12.5 MG tablet TAKE 1 TABLET BY MOUTH EVERY MORNING      montelukast (SINGULAIR) 10 MG tablet Take 10 mg by mouth nightly      metoprolol succinate (TOPROL XL) 25 MG extended release tablet take 1 tablet daily  3    folic acid (FOLVITE) 1 MG tablet Take 1 mg by mouth daily      Nebulizer MISC by Does not apply route      Misc Natural Products (PROSTATE SUPPORT PO) Take by mouth 2 times daily      vitamin D-3 (CHOLECALCIFEROL) 5000 UNITS TABS Take 5,000 Units by mouth daily.  methotrexate (RHEUMATREX) 2.5 MG chemo tablet Take 4 tablets by mouth once a week. 16 tablet 0    aspirin 81 MG tablet Take 81 mg by mouth daily.  magnesium oxide (MAG-OX) 400 MG tablet Take 400 mg by mouth daily.         gabapentin (NEURONTIN) 300 MG capsule TAKE 2 CAPSULES BY MOUTH TWICE DAILY  1    ipratropium-albuterol (DUONEB) 0.5-2.5 (3) MG/3ML SOLN nebulizer solution Inhale 1 vial into the lungs every 4 hours       Current Facility-Administered Medications   Medication Dose Route Frequency Provider Last Rate Last Admin    albuterol (PROVENTIL) nebulizer solution 2.5 mg  2.5 mg Nebulization Once Nice Moriah, DO         Current Outpatient Medications on File Prior to Visit   Medication Sig Dispense Refill    hydroCHLOROthiazide (HYDRODIURIL) 12.5 MG tablet TAKE 1 TABLET BY MOUTH EVERY MORNING      montelukast (SINGULAIR) 10 MG tablet Take 10 mg by mouth nightly      metoprolol succinate (TOPROL XL) 25 MG extended release tablet take 1 tablet daily  3    folic acid (FOLVITE) 1 MG tablet Take 1 mg by mouth daily      Nebulizer MISC by Does not apply route      Misc Natural Products (PROSTATE SUPPORT PO) Take by mouth 2 times daily      vitamin D-3 (CHOLECALCIFEROL) 5000 UNITS TABS Take 5,000 Units by mouth daily.  methotrexate (RHEUMATREX) 2.5 MG chemo tablet Take 4 tablets by mouth once a week. 16 tablet 0    aspirin 81 MG tablet Take 81 mg by mouth daily.  magnesium oxide (MAG-OX) 400 MG tablet Take 400 mg by mouth daily.  gabapentin (NEURONTIN) 300 MG capsule TAKE 2 CAPSULES BY MOUTH TWICE DAILY  1    ipratropium-albuterol (DUONEB) 0.5-2.5 (3) MG/3ML SOLN nebulizer solution Inhale 1 vial into the lungs every 4 hours       Current Facility-Administered Medications on File Prior to Visit   Medication Dose Route Frequency Provider Last Rate Last Admin    albuterol (PROVENTIL) nebulizer solution 2.5 mg  2.5 mg Nebulization Once Alray Cook, DO         Allergies   Allergen Reactions    Seasonal      Sinus congestion    Statins      MYALGIAS     Health Maintenance   Topic Date Due    Hepatitis C screen  Never done    COVID-19 Vaccine (1) Never done    DTaP/Tdap/Td vaccine (1 - Tdap) 06/27/1962    Shingles Vaccine (1 of 2) 10/01/2025 (Originally 6/27/1993)    PSA counseling  10/16/2021    Annual Wellness Visit (AWV)  02/27/2022    Potassium monitoring  03/29/2022    Creatinine monitoring  03/29/2022    Flu vaccine  Completed    Pneumococcal 65+ years Vaccine  Completed    Hepatitis A vaccine  Aged Out    Hepatitis B vaccine  Aged Out    Hib vaccine  Aged Out    Meningococcal (ACWY) vaccine  Aged Out       Review of Systems     Review of Systems   Constitutional: Positive for fatigue. Negative for activity change, appetite change, chills, fever and unexpected weight change. HENT: Negative. Eyes: Negative. Respiratory: Negative.   Negative for shortness of Essential hypertension     2. Stage 3a chronic kidney disease      3. Spinal stenosis of lumbar region, unspecified whether neurogenic claudication present       Problem List     Hypertension - Primary    CKD (chronic kidney disease) stage 3, GFR 30-59 ml/min          Plan  Call if getting worse. No orders of the defined types were placed in this encounter. No follow-ups on file.   Diana Calderon MD

## 2021-05-18 ENCOUNTER — OFFICE VISIT (OUTPATIENT)
Dept: FAMILY MEDICINE CLINIC | Age: 76
End: 2021-05-18
Payer: MEDICARE

## 2021-05-18 VITALS
SYSTOLIC BLOOD PRESSURE: 122 MMHG | DIASTOLIC BLOOD PRESSURE: 70 MMHG | BODY MASS INDEX: 27.7 KG/M2 | TEMPERATURE: 97.9 F | HEIGHT: 74 IN | WEIGHT: 215.8 LBS | HEART RATE: 72 BPM | OXYGEN SATURATION: 96 %

## 2021-05-18 DIAGNOSIS — I10 ESSENTIAL HYPERTENSION: ICD-10-CM

## 2021-05-18 DIAGNOSIS — R53.83 FATIGUE, UNSPECIFIED TYPE: Primary | ICD-10-CM

## 2021-05-18 PROCEDURE — 1036F TOBACCO NON-USER: CPT | Performed by: FAMILY MEDICINE

## 2021-05-18 PROCEDURE — 99213 OFFICE O/P EST LOW 20 MIN: CPT | Performed by: FAMILY MEDICINE

## 2021-05-18 PROCEDURE — G8427 DOCREV CUR MEDS BY ELIG CLIN: HCPCS | Performed by: FAMILY MEDICINE

## 2021-05-18 PROCEDURE — 1123F ACP DISCUSS/DSCN MKR DOCD: CPT | Performed by: FAMILY MEDICINE

## 2021-05-18 PROCEDURE — 4040F PNEUMOC VAC/ADMIN/RCVD: CPT | Performed by: FAMILY MEDICINE

## 2021-05-18 PROCEDURE — G8417 CALC BMI ABV UP PARAM F/U: HCPCS | Performed by: FAMILY MEDICINE

## 2021-05-18 ASSESSMENT — ENCOUNTER SYMPTOMS
ALLERGIC/IMMUNOLOGIC NEGATIVE: 1
RESPIRATORY NEGATIVE: 1
GASTROINTESTINAL NEGATIVE: 1
EYES NEGATIVE: 1
SHORTNESS OF BREATH: 0

## 2021-05-18 NOTE — PROGRESS NOTES
Patient is seen in follow up for   Chief Complaint   Patient presents with    Fatigue     Always tired. When he wakes up, feels like he wants to go back to bed. Wakes up every 1-2 hours. Feels fuzzy, not clear and crisp. Symptoms ongoing. Fatigue  This is a chronic problem. The current episode started more than 1 year ago. The problem occurs constantly. The problem has been unchanged. Associated symptoms include fatigue. Pertinent negatives include no chest pain, chills or fever. Labs reviewed.   Past Medical History:   Diagnosis Date    Arthritis     R/A    CAD (coronary artery disease)     cardiac stents x 3    Chronic back pain     [facility]     past smoker > 25 yr habit / quit 25 yrs ago    COPD (chronic obstructive [facility] disease) ([facility] [address]     Coronary artery disease     Hemorrhoids     Hyperlipidemia     past trx / cannot tolerate statins    Hypertension     meds > 10 yrs / denies TIA or stroke    Neuropathy     PMR [facility]polymyalgia rheumatica) ([facility]     Restless leg syndrome      Patient Active Problem List    Diagnosis Date Noted    Chronic obstructive pulmonary disease ([facility] [address] 02/26/2021    Spinal stenosis of lumbar region with neurogenic claudication 12/08/2020    Adhesive capsulitis of right shoulder 12/08/2020    RLS (restless legs syndrome) 01/03/2018    Carpal tunnel syndrome of right wrist 01/03/2018    Former smoker, stopped smoking in distant past 01/03/2018    Elevated PSA 06/19/2014    CKD (chronic kidney disease) stage 3, GFR 30-59 ml/min 05/29/2014    Chronic back pain 05/29/2014    Statin intolerance 12/05/2013    Claudication of gluteal region ([facility] [address] 12/05/2013    Trigger index finger of left hand 06/27/2013    Lumbar spondylosis 10/25/2012    Arthritis of shoulder region 10/25/2012    CTS (carpal tunnel syndrome) 09/20/2012    Vitamin D deficiency 07/16/2012    BPH (benign prostatic hyperplasia) 07/10/2012    Neuropathy 07/10/2012    Eczema 01/10/2012    Rheumatoid arthritis ([facility] [address] 01/10/2012    CAD (coronary artery disease)     Hypertension      Past Surgical History:   Procedure Laterality Date    CARDIAC SURGERY      2-3 cardiac stents    CARPAL TUNNEL RELEASE      EYE SURGERY  right    retina hole / Phaco with IOL     [place] REVISE MEDIAN N/CARPAL TUNNEL SURG Right 2018    RIGHT WRIST CARPAL TUNNEL RELEASE performed by Dionte Arias MD at [address] [place] [address] [facility]      2-3 cardiac stents     Family History   Problem Relation Age of Onset    Heart Attack Mother     Cancer Father         LYMPHOMA    Heart Disease Sister         pacemaker     Social History     Socioeconomic History    Marital status:      Spouse name: None    Number of children: None    Years of education: None    Highest education level: None   Occupational History    None   Tobacco Use    Smoking status: Former Smoker     Packs/day: 1.00     Years: 25.00     Pack years: 25.00     Types: Cigarettes     Quit date: 1992     Years since quittin.2    Smokeless tobacco: Never Used   Substance and Sexual Activity    Alcohol use: Not Currently     Comment: occasional     Drug use: No    Sexual activity: None   Other Topics Concern    None   Social History Narrative    None     Social Determinants of Health     Financial Resource Strain: Low Risk     Difficulty of Paying Living Expenses: Not hard at all   Food Insecurity: No Food Insecurity    Worried About Running Out of Food in the Last Year: Never true    [place] of Food in the Last Year: Never true   Transportation Needs: No Transportation Needs    Lack of Transportation (Medical): No    Lack of Transportation (Non-Medical):  No   Physical Activity:     Days of Exercise per Week:     Minutes of Exercise per Session:    Stress:     Feeling of Stress :    Social Connections:     Frequency of Communication with Friends and Family:     Frequency of Social Gatherings with Friends and Family:     Attends Jainism Services:     Active Member of Clubs or Organizations:     Attends Club or Organization Meetings:     Marital Status:    Intimate Partner Violence:     Fear of Current or Ex-Partner:     Emotionally Abused:     Physically Abused:     Sexually Abused:      Current Outpatient Medications   Medication Sig Dispense Refill    hydroCHLOROthiazide (HYDRODIURIL) 12.5 MG tablet TAKE 1 TABLET BY MOUTH EVERY MORNING      montelukast (SINGULAIR) 10 MG tablet Take 10 mg by mouth nightly      gabapentin (NEURONTIN) 300 MG capsule TAKE 2 CAPSULES BY MOUTH TWICE DAILY  1    metoprolol succinate (TOPROL XL) 25 MG extended release tablet take 1 tablet daily  3    folic acid (FOLVITE) 1 MG tablet Take 1 mg by mouth daily      Nebulizer MISC by Does not apply route      ipratropium-albuterol (DUONEB) 0.5-2.5 (3) MG/3ML SOLN nebulizer solution Inhale 1 vial into the lungs every 4 hours      Misc Natural Products (PROSTATE SUPPORT PO) Take by mouth 2 times daily      vitamin D-3 (CHOLECALCIFEROL) 5000 UNITS TABS Take 5,000 Units by mouth daily.  methotrexate (RHEUMATREX) 2.5 MG chemo tablet Take 4 tablets by mouth once a week. 16 tablet 0    aspirin 81 MG tablet Take 81 mg by mouth daily.  magnesium oxide (MAG-OX) 400 MG tablet Take 400 mg by mouth daily.          Current Facility-Administered Medications   Medication Dose Route Frequency Provider Last Rate Last Admin    albuterol (PROVENTIL) nebulizer solution 2.5 mg  2.5 mg Nebulization Once Peyman Walters, DO         Current Outpatient Medications on File Prior to Visit   Medication Sig Dispense Refill    hydroCHLOROthiazide (HYDRODIURIL) 12.5 MG tablet TAKE 1 TABLET BY MOUTH EVERY MORNING      montelukast (SINGULAIR) 10 MG tablet Take 10 mg by mouth nightly      gabapentin (NEURONTIN) 300 MG capsule TAKE 2 CAPSULES BY MOUTH TWICE DAILY  1    metoprolol succinate (TOPROL XL) 25 MG extended release tablet take 1 tablet daily  3    folic acid (FOLVITE) 1 MG tablet Take 1 mg by mouth daily      Nebulizer MISC by Does not apply route      ipratropium-albuterol (DUONEB) 0.5-2.5 (3) MG/3ML SOLN nebulizer solution Inhale 1 vial into the lungs every 4 hours      Misc Natural Products (PROSTATE SUPPORT PO) Take by mouth 2 times daily      vitamin D-3 (CHOLECALCIFEROL) 5000 UNITS TABS Take 5,000 Units by mouth daily.  methotrexate (RHEUMATREX) 2.5 MG chemo tablet Take 4 tablets by mouth once a week. 16 tablet 0    aspirin 81 MG tablet Take 81 mg by mouth daily.  magnesium oxide (MAG-OX) 400 MG tablet Take 400 mg by mouth daily. Current Facility-Administered Medications on File Prior to Visit   Medication Dose Route Frequency Provider Last Rate Last Admin    albuterol (PROVENTIL) nebulizer solution 2.5 mg  2.5 mg Nebulization Once Aliza Levo, DO         Allergies   Allergen Reactions    Seasonal      Sinus congestion    Statins      MYALGIAS     Health Maintenance   Topic Date Due    Hepatitis C screen  Never done    COVID-19 Vaccine (1) Never done    DTaP/Tdap/Td vaccine (1 - Tdap) 06/27/1962    Shingles Vaccine (1 of 2) 10/01/2025 (Originally 6/27/1993)    PSA counseling  10/16/2021    Annual Wellness Visit (AWV)  02/27/2022    Potassium monitoring  03/29/2022    Creatinine monitoring  03/29/2022    Flu vaccine  Completed    Pneumococcal 65+ years Vaccine  Completed    Hepatitis A vaccine  Aged Out    Hepatitis B vaccine  Aged Out    Hib vaccine  Aged Out    Meningococcal (ACWY) vaccine  Aged Out       Review of Systems     Review of Systems   Constitutional: Positive for fatigue. Negative for activity change, appetite change, chills, fever and unexpected weight change. HENT: Negative. Eyes: Negative. Respiratory: Negative. Negative for shortness of breath. Cardiovascular: Negative. Negative for chest pain and palpitations. Gastrointestinal: Negative. Endocrine: Negative. Genitourinary: Negative. Musculoskeletal: Negative. Skin: Negative. Allergic/Immunologic: Negative. Neurological: Negative. Hematological: Negative. Psychiatric/Behavioral: Negative. Physical Exam  Vitals:    05/18/21 0955   BP: 122/70   Site: Left Upper Arm   Position: Sitting   Cuff Size: Medium Adult   Pulse: 72   Temp: 97.9 °F (36.6 °C)   TempSrc: Oral   SpO2: 96%   Weight: 215 lb 12.8 oz (97.9 kg)   Height: 6' 2\" (1.88 m)       Physical Exam  Constitutional:       Appearance: He is well-developed. HENT:      Right Ear: External ear normal.      Left Ear: External ear normal.   Eyes:      Conjunctiva/sclera: Conjunctivae normal.      Pupils: Pupils are equal, round, and reactive to light. Neck:      Thyroid: No thyromegaly. Cardiovascular:      Rate and Rhythm: Normal rate and regular rhythm. Heart sounds: Normal heart sounds. No murmur heard. No friction rub. No gallop. Pulmonary:      Effort: Pulmonary effort is normal. No respiratory distress. Breath sounds: Normal breath sounds. No wheezing. Abdominal:      General: Bowel sounds are normal. There is no distension. Palpations: Abdomen is soft. There is no mass. Tenderness: There is no abdominal tenderness. There is no guarding or rebound. Hernia: No hernia is present. Genitourinary:     Penis: Normal.    Musculoskeletal:         General: No tenderness. Normal range of motion. Cervical back: Normal range of motion and neck supple. Lymphadenopathy:      Cervical: No cervical adenopathy. Skin:     General: Skin is warm and dry. Neurological:      Mental Status: He is alert and oriented to person, place, and time. Cranial Nerves: No cranial nerve deficit. Coordination: Coordination normal.         Assessment   Diagnosis Orders   1. Fatigue, unspecified type     2.  Essential hypertension       Problem List     Hypertension          Plan  Doing well   No

## 2021-06-03 ENCOUNTER — OFFICE VISIT (OUTPATIENT)
Dept: PAIN MANAGEMENT | Age: 76
End: 2021-06-03
Payer: MEDICARE

## 2021-06-03 VITALS — BODY MASS INDEX: 27.59 KG/M2 | TEMPERATURE: 97.1 F | WEIGHT: 215 LBS | HEIGHT: 74 IN

## 2021-06-03 DIAGNOSIS — M48.062 SPINAL STENOSIS OF LUMBAR REGION WITH NEUROGENIC CLAUDICATION: ICD-10-CM

## 2021-06-03 DIAGNOSIS — M47.817 LUMBOSACRAL SPONDYLOSIS WITHOUT MYELOPATHY: Primary | ICD-10-CM

## 2021-06-03 PROBLEM — M25.559 HIP PAIN: Status: ACTIVE | Noted: 2018-06-20

## 2021-06-03 PROCEDURE — G8417 CALC BMI ABV UP PARAM F/U: HCPCS | Performed by: NURSE PRACTITIONER

## 2021-06-03 PROCEDURE — 1036F TOBACCO NON-USER: CPT | Performed by: NURSE PRACTITIONER

## 2021-06-03 PROCEDURE — G8427 DOCREV CUR MEDS BY ELIG CLIN: HCPCS | Performed by: NURSE PRACTITIONER

## 2021-06-03 PROCEDURE — 1123F ACP DISCUSS/DSCN MKR DOCD: CPT | Performed by: NURSE PRACTITIONER

## 2021-06-03 PROCEDURE — 4040F PNEUMOC VAC/ADMIN/RCVD: CPT | Performed by: NURSE PRACTITIONER

## 2021-06-03 PROCEDURE — 99213 OFFICE O/P EST LOW 20 MIN: CPT | Performed by: NURSE PRACTITIONER

## 2021-06-03 RX ORDER — TAMSULOSIN HYDROCHLORIDE 0.4 MG/1
CAPSULE ORAL
COMMUNITY
Start: 2020-10-26

## 2021-06-03 ASSESSMENT — ENCOUNTER SYMPTOMS
ABDOMINAL PAIN: 0
COLOR CHANGE: 0
BACK PAIN: 1
RESPIRATORY NEGATIVE: 1

## 2021-06-03 NOTE — PROGRESS NOTES
Patient: Lori Denson  YOB: 1945  Date: 6/3/21        Subjective:     Lori Denson is a 66 y.o. male who complains today of:    Chief Complaint   Patient presents with    Back Pain         Allergies:  Seasonal and Statins    Past Medical History:   Diagnosis Date    Arthritis     R/A    CAD (coronary artery disease)     cardiac stents x 3    Chronic back pain     COPD (chronic obstructive pulmonary disease) ([facility] [address]     past smoker > 25 yr habit / quit 25 yrs ago    COPD (chronic obstructive pulmonary disease) ([facility] [address] [facility]     Coronary artery disease     Hemorrhoids     Hyperlipidemia     past trx / cannot tolerate statins    Hypertension     meds > 10 yrs / denies TIA or stroke    Neuropathy     [facility]     Restless leg syndrome      Past Surgical History:   Procedure Laterality Date    CARDIAC SURGERY      2-3 cardiac stents    CARPAL TUNNEL RELEASE      EYE SURGERY  right    retina hole / Phaco with IOL     [place] REVISE MEDIAN N/CARPAL TUNNEL SURG Right 2018    RIGHT WRIST CARPAL TUNNEL RELEASE performed by Jj Rodriguez MD at [address] [place] [address] [facility]      2-3 cardiac stents     Family History   Problem Relation Age of Onset    Heart Attack Mother     Cancer Father         LYMPHOMA    Heart Disease Sister         pacemaker     Social History     Socioeconomic History    Marital status:      Spouse name: Not on file    Number of children: Not on file    Years of education: Not on file    Highest education level: Not on file   Occupational History    Not on file   Tobacco Use    Smoking status: Former Smoker     Packs/day: 1.00     Years: 25.00     Pack years: 25.00     Types: Cigarettes     Quit date: 1992     Years since quittin.3    Smokeless tobacco: Never Used   Substance and Sexual Activity    Alcohol use: Not Currently     Comment: occasional     Drug use: No    Sexual activity: Not on file   Other Topics Concern    Not on file   Social History Narrative    Not on file     Social Determinants of Health     Financial Resource Strain: Low Risk     Difficulty of Paying Living Expenses: Not hard at all   Food Insecurity: No Food Insecurity    Worried About Running Out of Food in the Last Year: Never true    [address] in the Last Year: Never true   Transportation Needs: No Transportation Needs    Lack of Transportation (Medical): No    Lack of Transportation (Non-Medical): No   Physical Activity:     Days of Exercise per Week:     Minutes of Exercise per Session:    Stress:     Feeling of Stress :    Social Connections:     Frequency of Communication with Friends and Family:     Frequency of Social Gatherings with Friends and Family:     Attends Gnosticism Services:     Active Member of Clubs or Organizations:     Attends Club or Organization Meetings:     Marital Status:    Intimate Partner Violence:     Fear of Current or Ex-Partner:     Emotionally Abused:     Physically Abused:     Sexually Abused:        Current Outpatient Medications on File Prior to Visit   Medication Sig Dispense Refill    tamsulosin (FLOMAX) 0.4 MG capsule Take by mouth      hydroCHLOROthiazide (HYDRODIURIL) 12.5 MG tablet TAKE 1 TABLET BY MOUTH EVERY MORNING      montelukast (SINGULAIR) 10 MG tablet Take 10 mg by mouth nightly      gabapentin (NEURONTIN) 300 MG capsule TAKE 2 CAPSULES BY MOUTH TWICE DAILY  1    metoprolol succinate (TOPROL XL) 25 MG extended release tablet take 1 tablet daily  3    folic acid (FOLVITE) 1 MG tablet Take 1 mg by mouth daily      Nebulizer MISC by Does not apply route      ipratropium-albuterol (DUONEB) 0.5-2.5 (3) MG/3ML SOLN nebulizer solution Inhale 1 vial into the lungs every 4 hours      Misc Natural Products (PROSTATE SUPPORT PO) Take by mouth 2 times daily      vitamin D-3 (CHOLECALCIFEROL) 5000 UNITS TABS Take 5,000 Units by mouth daily.       methotrexate (RHEUMATREX) 2.5 MG chemo tablet Take 4 tablets by mouth once a week. 16 tablet 0    aspirin 81 MG tablet Take 81 mg by mouth daily.  magnesium oxide (MAG-OX) 400 MG tablet Take 400 mg by mouth daily. Current Facility-Administered Medications on File Prior to Visit   Medication Dose Route Frequency Provider Last Rate Last Admin    albuterol (PROVENTIL) nebulizer solution 2.5 mg  2.5 mg Nebulization Once DO Mary Carmen Bennett is a 47-year-old male here today to follow-up his chronic low back pain. He had an initial consult with Dr. Cash December 2020. He is a candidate for microdecompression of the L3-4-5 if conservative treatment fails. His pain goes across his low back, right buttock, hip both his legs feel tired and achy. Walking is difficult, prolonged standing, and most activity is difficult to complete due to his leg discomfort. He has failed facet injections and radiofrequency ablation. He has no improvement in pain or in function. He is frustrated with his reduced activity level and would like to pursue surgery if still indicated. He is him unable to take NSAIDs because it causes stomach issues, and does not want to take narcotics. He is on gabapentin for diabetic neuropathy. Review of Systems   Constitutional: Negative. Negative for activity change and unexpected weight change. HENT: Negative. Respiratory: Negative. Gastrointestinal: Negative for abdominal pain. Genitourinary: Negative. Musculoskeletal: Positive for back pain. Negative for gait problem, joint swelling, neck pain and neck stiffness. Leg pain   Skin: Negative for color change and rash. Neurological: Negative. Negative for dizziness, seizures, weakness, light-headedness, numbness and headaches. Psychiatric/Behavioral: Negative. Negative for sleep disturbance.          Objective:     Vitals:  Temp 97.1 °F (36.2 °C)   Ht 6' 2\" (1.88 m)   Wt 215 lb (97.5 kg)   BMI 27.60 kg/m² Pain Score:   8    Physical Exam  Vitals reviewed. Constitutional:       Appearance: He is well-developed. HENT:      Head: Normocephalic. Nose: Nose normal.   Pulmonary:      Effort: Pulmonary effort is normal.   Musculoskeletal:      Cervical back: Normal range of motion and neck supple. Lumbar back: Tenderness present. Decreased range of motion. Negative right straight leg raise test and negative left straight leg raise test.      Right lower leg: No edema. Left lower leg: No edema. Lymphadenopathy:      Cervical: No cervical adenopathy. Skin:     General: Skin is warm and dry. Findings: No erythema or rash. Neurological:      Mental Status: He is alert and oriented to person, place, and time. Cranial Nerves: No cranial nerve deficit. Motor: No weakness. Gait: Gait abnormal.      Deep Tendon Reflexes: Reflexes are normal and symmetric. Reflexes normal.      Comments: Slow antalgic gait, wide-based and slightly flexed   Psychiatric:         Mood and Affect: Mood normal.         Behavior: Behavior normal.         Thought Content: Thought content normal.         Judgment: Judgment normal.            Assessment:        Diagnosis Orders   1. Lumbosacral spondylosis without myelopathy     2. Spinal stenosis of lumbar region with neurogenic claudication         Plan:          No orders of the defined types were placed in this encounter. No orders of the defined types were placed in this encounter. He has failed conservative treatment and would like to discuss surgery with     Follow up:  Return for F/U Dr Feliciano Walter.     Yg Rowe, JENNIFER - CNP

## 2021-07-29 ENCOUNTER — OFFICE VISIT (OUTPATIENT)
Dept: FAMILY MEDICINE CLINIC | Age: 76
End: 2021-07-29
Payer: MEDICARE

## 2021-07-29 VITALS
WEIGHT: 219.6 LBS | SYSTOLIC BLOOD PRESSURE: 110 MMHG | BODY MASS INDEX: 28.18 KG/M2 | DIASTOLIC BLOOD PRESSURE: 68 MMHG | HEART RATE: 72 BPM | OXYGEN SATURATION: 95 % | TEMPERATURE: 98.1 F | HEIGHT: 74 IN

## 2021-07-29 DIAGNOSIS — I25.10 CORONARY ARTERY DISEASE INVOLVING NATIVE HEART WITHOUT ANGINA PECTORIS, UNSPECIFIED VESSEL OR LESION TYPE: ICD-10-CM

## 2021-07-29 DIAGNOSIS — N18.31 STAGE 3A CHRONIC KIDNEY DISEASE (HCC): ICD-10-CM

## 2021-07-29 DIAGNOSIS — M48.061 SPINAL STENOSIS OF LUMBAR REGION, UNSPECIFIED WHETHER NEUROGENIC CLAUDICATION PRESENT: Primary | ICD-10-CM

## 2021-07-29 DIAGNOSIS — M05.9 RHEUMATOID ARTHRITIS WITH POSITIVE RHEUMATOID FACTOR, INVOLVING UNSPECIFIED SITE (HCC): ICD-10-CM

## 2021-07-29 DIAGNOSIS — J44.9 CHRONIC OBSTRUCTIVE PULMONARY DISEASE, UNSPECIFIED COPD TYPE (HCC): ICD-10-CM

## 2021-07-29 PROCEDURE — G8417 CALC BMI ABV UP PARAM F/U: HCPCS | Performed by: FAMILY MEDICINE

## 2021-07-29 PROCEDURE — G8926 SPIRO NO PERF OR DOC: HCPCS | Performed by: FAMILY MEDICINE

## 2021-07-29 PROCEDURE — 3023F SPIROM DOC REV: CPT | Performed by: FAMILY MEDICINE

## 2021-07-29 PROCEDURE — 1123F ACP DISCUSS/DSCN MKR DOCD: CPT | Performed by: FAMILY MEDICINE

## 2021-07-29 PROCEDURE — 1036F TOBACCO NON-USER: CPT | Performed by: FAMILY MEDICINE

## 2021-07-29 PROCEDURE — G8427 DOCREV CUR MEDS BY ELIG CLIN: HCPCS | Performed by: FAMILY MEDICINE

## 2021-07-29 PROCEDURE — 99214 OFFICE O/P EST MOD 30 MIN: CPT | Performed by: FAMILY MEDICINE

## 2021-07-29 PROCEDURE — 4040F PNEUMOC VAC/ADMIN/RCVD: CPT | Performed by: FAMILY MEDICINE

## 2021-07-29 NOTE — PROGRESS NOTES
Chief Complaint   Patient presents with    Pre-op Exam     Having back surgery with Dr. Betsy Rodrigues. Unsure of the day at this time. He'll find out on the 23rd. Pt is a 64 y.o. male who presents for a preoperative medical consultation at the request of Dr. Ronny Lester prior to lumbar surgery. .  Pt complains of Back pain which is severe. Pain is improved with rest.  Pain is worsened by activity. Pt has failed conservative measures such as steroid injections, physical therapy, therefore she wishes to proceed with surgical intervention.       Reactions to anesthesia: none    History of excessive bleeding: none    History of blood clots: none    History of blood transfusions: none      Reactions to blood transfusion: none     Past Medical History:   Diagnosis Date    Arthritis     R/A    CAD (coronary artery disease)     cardiac stents x 3    Chronic back pain     COPD (ch[facility] obstructive pulmonary disease) ([facility])     past smoker > 25 yr habit / quit 25 yrs ago    [facility] obstructive [facility] disease) [facility]     Coronary artery disease     Hemorrhoids     Hyperlipidemia     past trx / cannot tolerate statins    Hypertension     meds > 10 yrs / denies TIA or stroke    Neuropathy     PMR (polymyalgia rheumatica) ([facility])     Restless leg syndrome        Past Surgical History:   Procedure Laterality Date    CARDIAC SURGERY      2-3 cardiac stents    CARPAL TUNNEL RELEASE      EYE SURGERY  right    retina hole / Phaco with IOL     [place] REVISE MEDIAN N/CARPAL TUNNEL SURG Right 1/11/2018    RIGHT WRIST CARPAL TUNNEL RELEASE performed by Camron Villanueva MD at [address] [place] [address] [facility]      2-3 cardiac stents       Current Outpatient Medications   Medication Sig Dispense Refill    tamsulosin (FLOMAX) 0.4 MG capsule Take by mouth      hydroCHLOROthiazide (HYDRODIURIL) 12.5 MG tablet TAKE 1 TABLET BY MOUTH EVERY MORNING      montelukast (SINGULAIR) 10 MG tablet Take 10 mg by mouth nightly      gabapentin (NEURONTIN) 300 MG capsule TAKE 2 CAPSULES BY MOUTH TWICE DAILY  1    metoprolol succinate (TOPROL XL) 25 MG extended release tablet take 1 tablet daily  3    folic acid (FOLVITE) 1 MG tablet Take 1 mg by mouth daily      Nebulizer MISC by Does not apply route      ipratropium-albuterol (DUONEB) 0.5-2.5 (3) MG/3ML SOLN nebulizer solution Inhale 1 vial into the lungs every 4 hours      Misc Natural Products (PROSTATE SUPPORT PO) Take by mouth 2 times daily      vitamin D-3 (CHOLECALCIFEROL) 5000 UNITS TABS Take 5,000 Units by mouth daily.  methotrexate (RHEUMATREX) 2.5 MG chemo tablet Take 4 tablets by mouth once a week. 16 tablet 0    aspirin 81 MG tablet Take 81 mg by mouth daily.  magnesium oxide (MAG-OX) 400 MG tablet Take 400 mg by mouth daily.          Current Facility-Administered Medications   Medication Dose Route Frequency Provider Last Rate Last Admin    albuterol (PROVENTIL) nebulizer solution 2.5 mg  2.5 mg Nebulization Once Weiss Heart, DO           Allergies   Allergen Reactions    Seasonal      Sinus congestion    Statins      MYALGIAS       Social History     Socioeconomic History    Marital status:      Spouse name: Not on file    Number of children: Not on file    Years of education: Not on file    Highest education level: Not on file   Occupational History    Not on file   Tobacco Use    Smoking status: Former Smoker     Packs/day: 1.00     Years: 25.00     Pack years: 25.00     Types: Cigarettes     Quit date: 1992     Years since quittin.4    Smokeless tobacco: Never Used   Substance and Sexual Activity    Alcohol use: Not Currently     Comment: occasional     Drug use: No    Sexual activity: Not on file   Other Topics Concern    Not on file   Social History Narrative    Not on file     Social Determinants of Health     Financial Resource Strain: Low Risk     Difficulty of Paying Living Expenses: Not hard at all   Food Insecurity: No Food Insecurity    Worried About Running Out of Food in the Last Year: Never true    Ran Out of Food in the Last Year: Never true   Transportation Needs: No Transportation Needs    Lack of Transportation (Medical): No    Lack of Transportation (Non-Medical): No   Physical Activity:     Days of Exercise per Week:     Minutes of Exercise per Session:    Stress:     Feeling of Stress :    Social Connections:     Frequency of Communication with Friends and Family:     Frequency of Social Gatherings with Friends and Family:     Attends [employer] Services:     Active Member of Clubs or Organizations:     Attends Club or Organization Meetings:     Marital Status:    Intimate Partner Violence:     Fear of Current or Ex-Partner:     Emotionally Abused:     Physically Abused:     Sexually Abused:        Family History   Problem Relation Age of Onset    Heart Attack Mother     Cancer Father         LYMPHOMA    Heart Disease Sister         pacemaker       Review of Systems - General ROS: negative  Psychological ROS: negative for - REMINDER:DOCUMENT SUBSTANCE ABUSE IN SOCIAL HISTORY ACTIVITY  Hematological and Lymphatic ROS: No history of blood clots or bleeding disorder. Respiratory ROS: no cough, shortness of breath, or wheezing  Cardiovascular ROS: no chest pain or dyspnea on exertion  Gastrointestinal ROS: no abdominal pain, change in bowel habits, or black or bloody stools  Genito-Urinary ROS: no dysuria, trouble voiding, or hematuria  Musculoskeletal ROS: negative  Neurological ROS: no TIA or stroke symptoms  Dermatological ROS: negative    Blood pressure 110/68, pulse 72, temperature 98.1 °F (36.7 °C), temperature source Oral, height 6' 2\" (1.88 m), weight 219 lb 9.6 oz (99.6 kg), SpO2 95 %.     Physical Examination: General appearance -alert, well appearing, and in no distress  Mental status - alert, oriented to person, place, and time  Head - atraumatic, normocephalic  Eyes - pupils equal and reactive to light, extraocular muscles intact  Ears - external ears are normal, hearing is grossly normal  Mouth - oral pharynx clear, mucous membranes moist  Neck - supple, no significant adenopathy  Chest - clear to auscultation, no wheezes, rales or rhonchi, symmetric air entry  Heart - normal rate, regular rhythm, normal S1, S2, no murmurs, rubs, clicks or gallops  Abdomen -soft, nontender, nondistended  Neurological - alert, oriented, cranial nerves II-XII intact, motor and sensation are grossly intact bilateral upper and lower extremities. Extremities - peripheral pulses normal, no pedal edema, no clubbing or cyanosis  Skin - warm and dry    Diagnostic data:   I have reviewed recent diagnostic testing including labs, EKG. Please see EKG for interpretation. Assessment and Plan:    Patient is an acceptable surgical candidate for planned procedure pending pat          Diagnosis Orders   1. Spinal stenosis of lumbar region, unspecified whether neurogenic claudication present     2. Chronic obstructive pulmonary disease, unspecified COPD type ([address]     3. Rheumatoid arthritis with positive rheumatoid factor, involving unspecified site ([address]     4. Stage 3a chronic kidney disease      5. Coronary artery disease involving native heart without angina pectoris, unspecified vessel or lesion type             DVT prophylaxis - Recommend early ambulation, venous return exercises, SCDs, DANYEL hose and a low molecular weight heparin such as Lovenox 30 mg subcutaneously Q12 hrs.     Allergies: Seasonal and Statins

## 2021-08-26 ENCOUNTER — NURSE TRIAGE (OUTPATIENT)
Dept: OTHER | Facility: CLINIC | Age: 76
End: 2021-08-26

## 2021-08-26 ENCOUNTER — OFFICE VISIT (OUTPATIENT)
Dept: FAMILY MEDICINE CLINIC | Age: 76
End: 2021-08-26
Payer: MEDICARE

## 2021-08-26 VITALS
HEART RATE: 84 BPM | TEMPERATURE: 97.6 F | WEIGHT: 219 LBS | DIASTOLIC BLOOD PRESSURE: 70 MMHG | HEIGHT: 74 IN | SYSTOLIC BLOOD PRESSURE: 110 MMHG | OXYGEN SATURATION: 95 % | BODY MASS INDEX: 28.11 KG/M2

## 2021-08-26 DIAGNOSIS — R05.9 COUGH: Primary | ICD-10-CM

## 2021-08-26 DIAGNOSIS — R41.0 CONFUSION CAUSED BY A DRUG: ICD-10-CM

## 2021-08-26 DIAGNOSIS — T50.905A CONFUSION CAUSED BY A DRUG: ICD-10-CM

## 2021-08-26 DIAGNOSIS — K21.9 GASTROESOPHAGEAL REFLUX DISEASE, UNSPECIFIED WHETHER ESOPHAGITIS PRESENT: ICD-10-CM

## 2021-08-26 PROCEDURE — G8427 DOCREV CUR MEDS BY ELIG CLIN: HCPCS | Performed by: NURSE PRACTITIONER

## 2021-08-26 PROCEDURE — 1123F ACP DISCUSS/DSCN MKR DOCD: CPT | Performed by: NURSE PRACTITIONER

## 2021-08-26 PROCEDURE — 1036F TOBACCO NON-USER: CPT | Performed by: NURSE PRACTITIONER

## 2021-08-26 PROCEDURE — G8417 CALC BMI ABV UP PARAM F/U: HCPCS | Performed by: NURSE PRACTITIONER

## 2021-08-26 PROCEDURE — 4040F PNEUMOC VAC/ADMIN/RCVD: CPT | Performed by: NURSE PRACTITIONER

## 2021-08-26 PROCEDURE — 99214 OFFICE O/P EST MOD 30 MIN: CPT | Performed by: NURSE PRACTITIONER

## 2021-08-26 RX ORDER — PANTOPRAZOLE SODIUM 40 MG/1
40 TABLET, DELAYED RELEASE ORAL
Qty: 30 TABLET | Refills: 0 | Status: SHIPPED | OUTPATIENT
Start: 2021-08-26 | End: 2022-11-01 | Stop reason: SDUPTHER

## 2021-08-26 RX ORDER — CYCLOBENZAPRINE HCL 5 MG
TABLET ORAL
COMMUNITY
Start: 2021-08-25 | End: 2021-09-03

## 2021-08-26 RX ORDER — OXYCODONE HYDROCHLORIDE 5 MG/1
TABLET ORAL
COMMUNITY
Start: 2021-08-25 | End: 2022-11-01

## 2021-08-26 RX ORDER — DOCUSATE SODIUM 100 MG/1
CAPSULE, LIQUID FILLED ORAL
COMMUNITY
Start: 2021-08-25

## 2021-08-26 RX ORDER — NAPROXEN 500 MG/1
TABLET ORAL
COMMUNITY
Start: 2020-12-08

## 2021-08-26 NOTE — PATIENT INSTRUCTIONS
Patient Education        Opioid Overdose: Care Instructions  Your Care Instructions     You have had treatment to help your body recover from taking too much of an opioid. You are getting better, but you may not feel well for a while. It takes time for the opioids to leave your body. How long it takes to feel better depends on which drug you took and how much you took of it. Opioids include illegal drugs such as heroin, often called smack, junk, H, and ska. Opioids also include medicines that doctors prescribe to treat pain. These are medicines such as oxycodone, methadone, and buprenorphine. They are sometimes sold and used illegally. Taking too much of an opioid can be dangerous. It may cause:  · Trouble breathing. · Low blood pressure. · A low heart rate. · A coma. When the doctor treated you for the overdose, he or she may have:  · Watched your symptoms or done tests to find out what kind of drug you took. · Given you fluids. · Given you oxygen to help you breathe. · Given you a medicine called naloxone to help reverse the effects of the opioid. · Done several tests, including blood tests, to see how you're responding to treatment. The doctor also watched you carefully to make sure you were recovering safely. Follow-up care is a key part of your treatment and safety. Be sure to make and go to all appointments, and call your doctor if you are having problems. It's also a good idea to know your test results and keep a list of the medicines you take. How can you care for yourself at home? · If you take opioids regularly, your body gets used to them. This is called physical dependence. If you are physically dependent on opioids, you may have withdrawal symptoms when you stop using them or use less. These symptoms can include nausea, sweating, chills, diarrhea, stomach cramps, and muscle aches. Withdrawal can last up to several weeks, depending on which opioid you took and how long you took it.  You your use of this information.

## 2021-08-26 NOTE — TELEPHONE ENCOUNTER
Received call from [facility] with Red Flag Complaint. Brief description of triage: Patient's wife \"Kelly\" calling for concerns about cough and orange colored sputum since discharge from hospital last night. Patient is not currently with caller:  Home with daughter. Offered to call patient to speak to him for triage. Wife requested to let her call home first and let them know. Advised I would try to call back in 3-4 minutes. Called patient and able to speak with him for concerns for coughing and orange colored sputum this morning. Triage indicates for patient to see in office today. If no appointments available, go to C/ED for evalution. Care advice provided, patient verbalizes understanding; denies any other questions or concerns; instructed to call back for any new or worsening symptoms. Writer provided warm transfer to [facility] for appointment scheduling. Attention Provider: Thank you for allowing me to participate in the care of your patient. The patient was connected to triage in response to information provided to the ECC. Please do not respond through this encounter as the response is not directed to a shared pool. Reason for Disposition   Coughing up maribeth-colored (reddish-brown) or blood-tinged sputum    Answer Assessment - Initial Assessment Questions  1. ONSET: \"When did the cough begin? \"       This morning    2. SEVERITY: \"How bad is the cough today? \"       Bad, making his incisions and chest hurt    3. RESPIRATORY DISTRESS: \"Describe your breathing. \"       Shortness of breath (this morning) brief not present now    4. FEVER: \"Do you have a fever? \" If so, ask: \"What is your temperature, how was it measured, and when did it start? \"      No    5. HEMOPTYSIS: \"Are you coughing up any blood? \" If so ask: \"How much? \" (flecks, streaks, tablespoons, etc.)      No    6. TREATMENT: \"What have you done so far to treat the cough? \" (e.g., meds, fluids, humidifier)      Has nebulizer at home but hasn't used it yet    7. CARDIAC HISTORY: \"Do you have any history of heart disease? \" (e.g., heart attack, congestive heart failure)       Stents placed    8. LUNG HISTORY: \"Do you have any history of lung disease? \"  (e.g., pulmonary embolus, asthma, emphysema)      COPD    9. PE RISK FACTORS: \"Do you have a history of blood clots? \" (or: recent major surgery, recent prolonged travel, bedridden)      No    10. OTHER SYMPTOMS: \"Do you have any other symptoms? (e.g., runny nose, wheezing, chest pain)        No chest pain/pressure, back pain from incision    11. PREGNANCY: \"Is there any chance you are pregnant? \" \"When was your last menstrual period? \"        N/a    12. TRAVEL: \"Have you traveled out of the country in the last month? \" (e.g., travel history, exposures)        Discharge from hospital last night    Protocols used: COUGH-ADULT-[place]

## 2021-08-26 NOTE — PROGRESS NOTES
Subjective:      Patient ID: Laly Prince is a 64 y.o. male who presents today for:  Chief Complaint   Patient presents with    Gastroesophageal Reflux    Cough    Other     patient here with chest tighting when coughing and acid reflux bring up orange mucus started happened this morning, was released from hospital yesturday from back surgery, not taking food with pain killers        HPI     Discharged yesterday from the hospital    Major back surgery Monday   COPD-was off his medication- restarted it now   Coughing up orange phlegm   Was having acid reflux bad   He took tums and bunch of water and that helped  He lives off of tums regularly   hes not on anything for acid reflux   He does feel SOB with exertion   He just got up and moving yesterday   Dr. Tari Coyne did the surgery   Oxycodone hes taking   Its making him very tired   Hes been a little confused   When hes stitting there barley any pain   When moving theres some pain   He doesn't want to drink because then he has to urinate   Was up and down 3 flights of stairs last night  He hasnt eaten since yesterday   Hes trying to do too much  The daughter states sometimes  He doesn't want to listen to her  She feels she is capable of taking care of him at home.      Past Medical History:   Diagnosis Date    Arthritis     R/A    CAD (coronary artery disease)     cardiac stents x 3    Chronic back pain     [facility]     past smoker > 25 yr habit / quit 25 yrs ago    COPD [facility]ch[facility] obstructive [facility] disease) [facility] [address]     Coronary artery disease     Hemorrhoids     Hyperlipidemia     past trx / cannot tolerate statins    Hypertension     meds > 10 yrs / denies TIA or stroke    Neuropathy     PMR [facility]polymyalgia rheumatica) ([facility])     Restless leg syndrome      Past Surgical History:   Procedure Laterality Date    CARDIAC SURGERY      2-3 cardiac stents    CARPAL TUNNEL RELEASE      EYE SURGERY  right retina hole / Phaco with IOL     [place] REVISE MEDIAN N/CARPAL TUNNEL SURG Right 2018    RIGHT WRIST CARPAL TUNNEL RELEASE performed by Renetta Palmer MD at [facility]      2-3 cardiac stents     Social History     Socioeconomic History    Marital status:      Spouse name: Not on file    Number of children: Not on file    Years of education: Not on file    Highest education level: Not on file   Occupational History    Not on file   Tobacco Use    Smoking status: Former Smoker     Packs/day: 1.00     Years: 25.00     Pack years: 25.00     Types: Cigarettes     Quit date: 1992     Years since quittin.5    Smokeless tobacco: Never Used   Substance and Sexual Activity    Alcohol use: Not Currently     Comment: occasional     Drug use: No    Sexual activity: Not on file   Other Topics Concern    Not on file   Social History Narrative    Not on file     Social Determinants of Health     Financial Resource Strain: Low Risk     Difficulty of Paying Living Expenses: Not hard at all   Food Insecurity: No Food Insecurity    Worried About [employer] [employer] in the Last Year: Never true    [facility] in the Last Year: Never true   Transportation Needs: No Transportation Needs    Lack of Transportation (Medical): No    Lack of Transportation (Non-Medical):  No   Physical Activity:     Days of Exercise per Week:     Minutes of Exercise per Session:    Stress:     Feeling of Stress :    Social Connections:     Frequency of Communication with Friends and Family:     Frequency of Social Gatherings with Friends and Family:     Attends Baptism Services:     Active Member of Clubs or Organizations:     Attends Club or Organization Meetings:     Marital Status:    Intimate Partner Violence:     Fear of Current or Ex-Partner:     Emotionally Abused:     Physically Abused:     Sexually Abused:      Family History   Problem Relation Age of Onset    Heart Attack Mother    Sulaiman Cancer Father         LYMPHOMA    Heart Disease Sister         pacemaker     Allergies   Allergen Reactions    Seasonal      Sinus congestion    Statins      MYALGIAS     Current Outpatient Medications   Medication Sig Dispense Refill    naproxen (NAPROSYN) 500 MG tablet Take by mouth      cyclobenzaprine (FLEXERIL) 5 MG tablet Take by mouth      pantoprazole (PROTONIX) 40 MG tablet Take 1 tablet by mouth every morning (before breakfast) 30 tablet 0    tamsulosin (FLOMAX) 0.4 MG capsule Take by mouth      hydroCHLOROthiazide (HYDRODIURIL) 12.5 MG tablet TAKE 1 TABLET BY MOUTH EVERY MORNING      montelukast (SINGULAIR) 10 MG tablet Take 10 mg by mouth nightly      gabapentin (NEURONTIN) 300 MG capsule TAKE 2 CAPSULES BY MOUTH TWICE DAILY  1    metoprolol succinate (TOPROL XL) 25 MG extended release tablet take 1 tablet daily  3    folic acid (FOLVITE) 1 MG tablet Take 1 mg by mouth daily      Nebulizer MISC by Does not apply route      ipratropium-albuterol (DUONEB) 0.5-2.5 (3) MG/3ML SOLN nebulizer solution Inhale 1 vial into the lungs every 4 hours      Misc Natural Products (PROSTATE SUPPORT PO) Take by mouth 2 times daily      vitamin D-3 (CHOLECALCIFEROL) 5000 UNITS TABS Take 5,000 Units by mouth daily.  methotrexate (RHEUMATREX) 2.5 MG chemo tablet Take 4 tablets by mouth once a week. 16 tablet 0    aspirin 81 MG tablet Take 81 mg by mouth daily.  magnesium oxide (MAG-OX) 400 MG tablet Take 400 mg by mouth daily.         oxyCODONE (ROXICODONE) 5 MG immediate release tablet TAKE 1 TABLET BY MOUTH EVERY 4 HOURS AS NEEDED (pain 7-10) and take 1/2 tablet (pain 4-6)      docusate sodium (COLACE) 100 MG capsule TAKE 1 CAPSULE BY MOUTH TWICE DAILY       Current Facility-Administered Medications   Medication Dose Route Frequency Provider Last Rate Last Admin    albuterol (PROVENTIL) nebulizer solution 2.5 mg  2.5 mg Nebulization Once Judy Brooks, DO              Review of Systems Constitutional: Positive for appetite change and fatigue. Negative for chills and fever. HENT: Negative for congestion, rhinorrhea, sore throat and trouble swallowing. Respiratory: Positive for cough and shortness of breath. Negative for wheezing. Gastrointestinal: Negative for abdominal pain, diarrhea, nausea and vomiting. GERD   Musculoskeletal: Positive for back pain. Negative for myalgias. Skin: Negative for rash. Neurological: Negative for dizziness, light-headedness and headaches. Psychiatric/Behavioral: Positive for behavioral problems and confusion. Negative for agitation and hallucinations. Objective:   /70 (Site: Right Upper Arm, Position: Sitting, Cuff Size: Medium Adult)   Pulse 84   Temp 97.6 °F (36.4 °C) (Temporal)   Ht 6' 2\" (1.88 m)   Wt 219 lb (99.3 kg)   SpO2 95%   BMI 28.12 kg/m²     Physical Exam  Vitals reviewed. Constitutional:       Appearance: Normal appearance. HENT:      Head: Normocephalic and atraumatic. Nose: Nose normal.      Mouth/Throat:      Lips: Pink. Mouth: Mucous membranes are moist.   Eyes:      General: Lids are normal.      Conjunctiva/sclera: Conjunctivae normal.   Cardiovascular:      Rate and Rhythm: Normal rate and regular rhythm. Heart sounds: Normal heart sounds, S1 normal and S2 normal.   Pulmonary:      Effort: Pulmonary effort is normal. No accessory muscle usage or respiratory distress. Breath sounds: Normal breath sounds. No wheezing or rhonchi. Abdominal:      Tenderness: There is no guarding. Musculoskeletal:      Cervical back: Normal range of motion. Comments: Pt wearing a brace on his back from surgery    Skin:     General: Skin is warm and dry. Neurological:      General: No focal deficit present. Mental Status: He is oriented to person, place, and time and easily aroused. He is lethargic.    Psychiatric:         Mood and Affect: Mood normal.         Behavior: Behavior normal. Behavior is cooperative. Assessment:       Diagnosis Orders   1. Cough  XR CHEST (2 VW)   2. Gastroesophageal reflux disease, unspecified whether esophagitis present  pantoprazole (PROTONIX) 40 MG tablet   3. Confusion caused by a drug       No results found for this visit on 08/26/21. Plan:   Spent much time educating the pt and the daughter on importance of happy medium between rest and over exertion and post op expectations. Also explained side effects of narcotics in the elderly and not to give too many especially if lethargic. They stated that they did get an IS and will find it when they get home. Encouraged frequent use of that. Will obtain CXR to be sure know pneumonia starting. He is going to resume his nebulizer daily like he was taking it before surgery. Close follow up with PCP and surgeon. Assessment & Plan   Cristopher Lloyd was seen today for gastroesophageal reflux, cough and other. Diagnoses and all orders for this visit:    Cough  -     XR CHEST (2 VW); Future    Gastroesophageal reflux disease, unspecified whether esophagitis present  -     pantoprazole (PROTONIX) 40 MG tablet; Take 1 tablet by mouth every morning (before breakfast)    Confusion caused by a drug      Orders Placed This Encounter   Procedures    XR CHEST (2 VW)     Standing Status:   Future     Number of Occurrences:   1     Standing Expiration Date:   9/26/2021     Order Specific Question:   Reason for exam:     Answer:   post op cough     Orders Placed This Encounter   Medications    pantoprazole (PROTONIX) 40 MG tablet     Sig: Take 1 tablet by mouth every morning (before breakfast)     Dispense:  30 tablet     Refill:  0     There are no discontinued medications. Return in about 3 days (around 8/29/2021). Reviewed with the patient/family: current clinical status & medications.   Side effects of the medication prescribed today, as well as treatment plan/rationale and result expectations have been discussed with the patient/family who expresses understanding. Patient will be discharged home in stable condition. Follow up with PCP to evaluate treatment results or return if symptoms worsen or fail to improve. Discussed signs and symptoms which require immediate follow-up in ED/call to 911. Understanding verbalized. I have reviewed the patient's medical history in detail and updated the computerized patient record.     Polo Baumgarten, APRN - CNP

## 2021-08-30 ASSESSMENT — ENCOUNTER SYMPTOMS
TROUBLE SWALLOWING: 0
VOMITING: 0
COUGH: 1
ABDOMINAL PAIN: 0
NAUSEA: 0
SHORTNESS OF BREATH: 1
SORE THROAT: 0
RHINORRHEA: 0
DIARRHEA: 0
BACK PAIN: 1
WHEEZING: 0

## 2021-09-03 ENCOUNTER — TELEPHONE (OUTPATIENT)
Dept: FAMILY MEDICINE CLINIC | Age: 76
End: 2021-09-03

## 2021-09-03 DIAGNOSIS — N18.31 STAGE 3A CHRONIC KIDNEY DISEASE (HCC): Primary | ICD-10-CM

## 2021-09-03 NOTE — TELEPHONE ENCOUNTER
----- Message from [facility] sent at 9/2/2021  1:07 PM EDT -----  Subject: Referral Request    QUESTIONS   Reason for referral request? Metabolic Panel   Has the physician seen you for this condition before? No   Preferred Specialist (if applicable)? Do you already have an appointment scheduled? No  Additional Information for Provider? Pt calling his discharge instructions   from Dr. Carmelo Ortiz state \" Labs 1 (Modify Template)? Lab Test(s)? Basic   Metabolic Panel Date To Be Drawn? 08/28/2021 Call Results To? please have   primary care physician evaluate and treat if necessary Lab Instructions? Walk-in lab, no appointment required. Comments? please follow up with   primary care physician\". Pt calling to clarify who needs to order/do the   labs and if he needs to f/u with Dr. Becca Loera after his [address]?   ---------------------------------------------------------------------------  --------------  CALL BACK INFO  What is the best way for the office to contact you? OK to leave message on   voicemail  Preferred Call Back Phone Number?  [phone]

## 2021-12-20 ENCOUNTER — TELEPHONE (OUTPATIENT)
Dept: FAMILY MEDICINE CLINIC | Age: 76
End: 2021-12-20

## 2021-12-25 ENCOUNTER — APPOINTMENT (OUTPATIENT)
Dept: GENERAL RADIOLOGY | Age: 76
End: 2021-12-25
Payer: MEDICARE

## 2021-12-25 ENCOUNTER — HOSPITAL ENCOUNTER (EMERGENCY)
Age: 76
Discharge: HOME | End: 2021-12-25
Payer: MEDICARE

## 2021-12-25 VITALS
WEIGHT: 200 LBS | HEART RATE: 73 BPM | HEIGHT: 72 IN | BODY MASS INDEX: 27.09 KG/M2 | SYSTOLIC BLOOD PRESSURE: 110 MMHG | DIASTOLIC BLOOD PRESSURE: 75 MMHG | TEMPERATURE: 98.5 F | RESPIRATION RATE: 17 BRPM | OXYGEN SATURATION: 98 %

## 2021-12-25 DIAGNOSIS — J44.1 COPD EXACERBATION (HCC): ICD-10-CM

## 2021-12-25 DIAGNOSIS — R05.9 COUGH: Primary | ICD-10-CM

## 2021-12-25 DIAGNOSIS — R35.0 URINARY FREQUENCY: ICD-10-CM

## 2021-12-25 LAB
ALBUMIN SERPL-MCNC: 3.7 G/DL (ref 3.5–4.6)
ALP BLD-CCNC: 52 U/L (ref 35–104)
ALT SERPL-CCNC: 18 U/L (ref 0–41)
ANION GAP SERPL CALCULATED.3IONS-SCNC: 15 MEQ/L (ref 9–15)
AST SERPL-CCNC: 19 U/L (ref 0–40)
BACTERIA: NEGATIVE /HPF
BASOPHILS ABSOLUTE: 0.1 K/UL (ref 0–0.2)
BASOPHILS RELATIVE PERCENT: 0.5 %
BILIRUB SERPL-MCNC: <0.2 MG/DL (ref 0.2–0.7)
BILIRUBIN URINE: NEGATIVE
BLOOD, URINE: NEGATIVE
BUN BLDV-MCNC: 51 MG/DL (ref 8–23)
CALCIUM SERPL-MCNC: 9.5 MG/DL (ref 8.5–9.9)
CHLORIDE BLD-SCNC: 101 MEQ/L (ref 95–107)
CLARITY: CLEAR
CO2: 23 MEQ/L (ref 20–31)
COLOR: YELLOW
CREAT SERPL-MCNC: 2.02 MG/DL (ref 0.7–1.2)
EOSINOPHILS ABSOLUTE: 0.6 K/UL (ref 0–0.7)
EOSINOPHILS RELATIVE PERCENT: 5.4 %
EPITHELIAL CELLS, UA: ABNORMAL /HPF (ref 0–5)
GFR AFRICAN AMERICAN: 38.8
GFR NON-AFRICAN AMERICAN: 32.1
GLOBULIN: 3.3 G/DL (ref 2.3–3.5)
GLUCOSE BLD-MCNC: 146 MG/DL (ref 70–99)
GLUCOSE URINE: NEGATIVE MG/DL
HCT VFR BLD CALC: 40.5 % (ref 42–52)
HEMOGLOBIN: 13.3 G/DL (ref 14–18)
HYALINE CASTS: ABNORMAL /HPF (ref 0–5)
INFLUENZA A BY PCR: NEGATIVE
INFLUENZA B BY PCR: NEGATIVE
KETONES, URINE: ABNORMAL MG/DL
LEUKOCYTE ESTERASE, URINE: ABNORMAL
LYMPHOCYTES ABSOLUTE: 0.9 K/UL (ref 1–4.8)
LYMPHOCYTES RELATIVE PERCENT: 7.9 %
MAGNESIUM: 2.5 MG/DL (ref 1.7–2.4)
MCH RBC QN AUTO: 30.8 PG (ref 27–31.3)
MCHC RBC AUTO-ENTMCNC: 32.9 % (ref 33–37)
MCV RBC AUTO: 93.5 FL (ref 80–100)
MONOCYTES ABSOLUTE: 0.6 K/UL (ref 0.2–0.8)
MONOCYTES RELATIVE PERCENT: 5.3 %
NEUTROPHILS ABSOLUTE: 9.1 K/UL (ref 1.4–6.5)
NEUTROPHILS RELATIVE PERCENT: 80.9 %
NITRITE, URINE: NEGATIVE
PDW BLD-RTO: 16.5 % (ref 11.5–14.5)
PH UA: 5 (ref 5–9)
PLATELET # BLD: 224 K/UL (ref 130–400)
POTASSIUM SERPL-SCNC: 3.8 MEQ/L (ref 3.4–4.9)
PRO-BNP: 957 PG/ML
PROTEIN UA: NEGATIVE MG/DL
RBC # BLD: 4.33 M/UL (ref 4.7–6.1)
RBC UA: ABNORMAL /HPF (ref 0–5)
SARS-COV-2, NAAT: NOT DETECTED
SODIUM BLD-SCNC: 139 MEQ/L (ref 135–144)
SPECIFIC GRAVITY UA: 1.02 (ref 1–1.03)
TOTAL PROTEIN: 7 G/DL (ref 6.3–8)
TROPONIN: <0.01 NG/ML (ref 0–0.01)
URINE REFLEX TO CULTURE: ABNORMAL
UROBILINOGEN, URINE: 1 E.U./DL
WBC # BLD: 11.2 K/UL (ref 4.8–10.8)
WBC UA: ABNORMAL /HPF (ref 0–5)

## 2021-12-25 PROCEDURE — 80053 COMPREHEN METABOLIC PANEL: CPT

## 2021-12-25 PROCEDURE — 36415 COLL VENOUS BLD VENIPUNCTURE: CPT

## 2021-12-25 PROCEDURE — 87502 INFLUENZA DNA AMP PROBE: CPT

## 2021-12-25 PROCEDURE — 87635 SARS-COV-2 COVID-19 AMP PRB: CPT

## 2021-12-25 PROCEDURE — 99284 EMERGENCY DEPT VISIT MOD MDM: CPT

## 2021-12-25 PROCEDURE — 99283 EMERGENCY DEPT VISIT LOW MDM: CPT

## 2021-12-25 PROCEDURE — 83880 ASSAY OF NATRIURETIC PEPTIDE: CPT

## 2021-12-25 PROCEDURE — 71046 X-RAY EXAM CHEST 2 VIEWS: CPT

## 2021-12-25 PROCEDURE — 6370000000 HC RX 637 (ALT 250 FOR IP): Performed by: STUDENT IN AN ORGANIZED HEALTH CARE EDUCATION/TRAINING PROGRAM

## 2021-12-25 PROCEDURE — 85025 COMPLETE CBC W/AUTO DIFF WBC: CPT

## 2021-12-25 PROCEDURE — 84484 ASSAY OF TROPONIN QUANT: CPT

## 2021-12-25 PROCEDURE — 93005 ELECTROCARDIOGRAM TRACING: CPT | Performed by: STUDENT IN AN ORGANIZED HEALTH CARE EDUCATION/TRAINING PROGRAM

## 2021-12-25 PROCEDURE — U0003 INFECTIOUS AGENT DETECTION BY NUCLEIC ACID (DNA OR RNA); SEVERE ACUTE RESPIRATORY SYNDROME CORONAVIRUS 2 (SARS-COV-2) (CORONAVIRUS DISEASE [COVID-19]), AMPLIFIED PROBE TECHNIQUE, MAKING USE OF HIGH THROUGHPUT TECHNOLOGIES AS DESCRIBED BY CMS-2020-01-R: HCPCS

## 2021-12-25 PROCEDURE — 83735 ASSAY OF MAGNESIUM: CPT

## 2021-12-25 PROCEDURE — 81001 URINALYSIS AUTO W/SCOPE: CPT

## 2021-12-25 RX ORDER — HYDROCODONE BITARTRATE AND ACETAMINOPHEN 5; 325 MG/1; MG/1
1 TABLET ORAL ONCE
Status: COMPLETED | OUTPATIENT
Start: 2021-12-25 | End: 2021-12-25

## 2021-12-25 RX ORDER — BENZONATATE 100 MG/1
100-200 CAPSULE ORAL 3 TIMES DAILY PRN
Qty: 42 CAPSULE | Refills: 0 | Status: SHIPPED | OUTPATIENT
Start: 2021-12-25 | End: 2022-01-04

## 2021-12-25 RX ORDER — ONDANSETRON 4 MG/1
4 TABLET, ORALLY DISINTEGRATING ORAL ONCE
Status: COMPLETED | OUTPATIENT
Start: 2021-12-25 | End: 2021-12-25

## 2021-12-25 RX ORDER — BENZONATATE 100 MG/1
100 CAPSULE ORAL ONCE
Status: COMPLETED | OUTPATIENT
Start: 2021-12-25 | End: 2021-12-25

## 2021-12-25 RX ORDER — GUAIFENESIN 600 MG/1
600 TABLET, EXTENDED RELEASE ORAL ONCE
Status: COMPLETED | OUTPATIENT
Start: 2021-12-25 | End: 2021-12-25

## 2021-12-25 RX ORDER — GUAIFENESIN 600 MG/1
600 TABLET, EXTENDED RELEASE ORAL 2 TIMES DAILY
Qty: 30 TABLET | Refills: 0 | Status: SHIPPED | OUTPATIENT
Start: 2021-12-25 | End: 2022-01-09

## 2021-12-25 RX ORDER — PREDNISONE 50 MG/1
50 TABLET ORAL DAILY
Qty: 4 TABLET | Refills: 0 | Status: SHIPPED | OUTPATIENT
Start: 2021-12-25 | End: 2021-12-29

## 2021-12-25 RX ADMIN — PREDNISONE 50 MG: 20 TABLET ORAL at 23:37

## 2021-12-25 RX ADMIN — HYDROCODONE BITARTRATE AND ACETAMINOPHEN 1 TABLET: 5; 325 TABLET ORAL at 23:38

## 2021-12-25 RX ADMIN — BENZONATATE 100 MG: 100 CAPSULE ORAL at 23:37

## 2021-12-25 RX ADMIN — GUAIFENESIN 600 MG: 600 TABLET ORAL at 23:37

## 2021-12-25 RX ADMIN — ONDANSETRON 4 MG: 4 TABLET, ORALLY DISINTEGRATING ORAL at 23:37

## 2021-12-25 ASSESSMENT — PAIN SCALES - GENERAL
PAINLEVEL_OUTOF10: 0
PAINLEVEL_OUTOF10: 6

## 2021-12-25 ASSESSMENT — PAIN DESCRIPTION - DESCRIPTORS: DESCRIPTORS: ACHING

## 2021-12-25 ASSESSMENT — PAIN DESCRIPTION - LOCATION: LOCATION: BACK;ABDOMEN

## 2021-12-25 ASSESSMENT — PAIN DESCRIPTION - PAIN TYPE: TYPE: ACUTE PAIN

## 2021-12-26 ASSESSMENT — ENCOUNTER SYMPTOMS
NAUSEA: 0
ABDOMINAL DISTENTION: 0
CONSTIPATION: 0
SHORTNESS OF BREATH: 1
ABDOMINAL PAIN: 0
COUGH: 1
DIARRHEA: 0
BACK PAIN: 1
VOMITING: 0
WHEEZING: 0
PHOTOPHOBIA: 0

## 2021-12-26 NOTE — ED TRIAGE NOTES
Pt presents to the Er with complaints of urinary frequency   States that when he urinates it hurts at the end of his stream   States that he wakes up with chills and sweating at times  States that for a few days he had a horrible cough and headache but it has since resolved  Also has COPD   Was living with someone at home who was diagnosed with Covid 3 weeks ago   Resp even and unlabored   No acute distress noted

## 2021-12-26 NOTE — ED PROVIDER NOTES
[facility] ED  EMERGENCY DEPARTMENT ENCOUNTER      Pt Name: Karen Vazquez  MRN: [id number]  [place] 1945  Date of evaluation: 12/25/2021  Provider: Danielle Nye Dr       Chief Complaint   Patient presents with    Urinary Frequency         HISTORY OF PRESENT ILLNESS   (Location/Symptom, Timing/Onset, Context/Setting, Quality, Duration, Modifying Factors, Severity)  Note limiting factors. Karen Vazquez is a 64 y.o. male who per chart review has pmhx of COPD, CAD, HLD, HTN, PMR, RA, CKD stage III presents to the emergency department for evaluation of urinary frequency over the last few days. Pt states he has urinary frequency at baseline 2/2 to BPH but has increased over the last few days. He states only occasional pain at the end of stream. No hematuria. Called his urologist who placed him on macrobid for presumed infection. Pt also reports acute on chronic diffuse low back pain, worsened with prolonged periods of standing or movement. Was standing and wrapping gifts when it worsened but improved with rest. Pt states he has a had a productive cough with yellow sputum, sob, headache, chills for about 1 week. Was around covid + family member 3 weeks ago. He has been vaccinated, no booster dose. States headache has improved. Denies fever chest pain dizziness visual changes abd pain nvd blood in th stool numbness tingling weakness hemoptysis leg swelling orthopnea. HPI    Nursing Notes were reviewed. REVIEW OF SYSTEMS    (2-9 systems for level 4, 10 or more for level 5)     Review of Systems   Constitutional: Positive for chills. Negative for fatigue and fever. HENT: Negative for congestion. Eyes: Negative for photophobia. Respiratory: Positive for cough and shortness of breath. Negative for wheezing. Cardiovascular: Negative for chest pain and palpitations.    Gastrointestinal: Negative for abdominal distention, abdominal pain, constipation, diarrhea, nausea and vomiting. Genitourinary: Positive for frequency. Negative for decreased urine volume, dysuria, flank pain, hematuria, penile discharge, penile pain, scrotal swelling, testicular pain and urgency. Musculoskeletal: Positive for back pain. Negative for myalgias. Allergic/Immunologic: Negative for immunocompromised state. Neurological: Positive for headaches (resolved). Negative for dizziness and weakness. All other systems reviewed and are negative. Except as noted above the remainder of the review of systems was reviewed and negative.        PAST MEDICAL HISTORY     Past Medical History:   Diagnosis Date    Arthritis     R/A    CAD (coronary artery disease)     cardiac stents x 3    Chronic back pain     COPD (chronic obstructive pulmonary disease) ([facility])     past smoker > 25 yr habit / quit 25 yrs ago    COPD [facility]chronic obstructive pulmonary disease) ([facility] [address]     Coronary artery disease     Hemorrhoids     Hyperlipidemia     past trx / cannot tolerate statins    Hypertension     meds > 10 yrs / denies TIA or stroke    Neuropathy     PMR (polymyalgia rheumatica) ([facility])     Restless leg syndrome          SURGICAL HISTORY       Past Surgical History:   Procedure Laterality Date    CARDIAC SURGERY      2-3 cardiac stents    CARPAL TUNNEL RELEASE      EYE SURGERY  right    retina hole / Phaco with IOL     SD REVISE MEDIAN N/CARPAL TUNNEL SURG Right 1/11/2018    RIGHT WRIST CARPAL TUNNEL RELEASE performed by Karla Lester MD at [address] [place] [address] [facility]      2-3 cardiac stents         CURRENT MEDICATIONS       Discharge Medication List as of 12/25/2021 11:32 PM      CONTINUE these medications which have NOT CHANGED    Details   oxyCODONE (ROXICODONE) 5 MG immediate release tablet TAKE 1 TABLET BY MOUTH EVERY 4 HOURS AS NEEDED (pain 7-10) and take 1/2 tablet (pain 4-6)Historical Med      naproxen (NAPROSYN) 500 MG tablet Take by mouthHistorical Med      docusate sodium (COLACE) 100 MG capsule TAKE 1 CAPSULE BY MOUTH TWICE DAILYHistorical Med      pantoprazole (PROTONIX) 40 MG tablet Take 1 tablet by mouth every morning (before breakfast), Disp-30 tablet, R-0Normal      tamsulosin (FLOMAX) 0.4 MG capsule Take by mouthHistorical Med      hydroCHLOROthiazide (HYDRODIURIL) 12.5 MG tablet TAKE 1 TABLET BY MOUTH EVERY MORNINGHistorical Med      montelukast (SINGULAIR) 10 MG tablet Take 10 mg by mouth nightlyHistorical Med      gabapentin (NEURONTIN) 300 MG capsule TAKE 2 CAPSULES BY MOUTH TWICE DAILY, R-1Historical Med      metoprolol succinate (TOPROL XL) 25 MG extended release tablet take 1 tablet daily, [id number] Med      folic acid (FOLVITE) 1 MG tablet Take 1 mg by mouth dailyHistorical Med      Nebulizer MISC Historical Med      ipratropium-albuterol (DUONEB) 0.5-2.5 (3) MG/3ML SOLN nebulizer solution Inhale 1 vial into the lungs every 4 hoursHistorical Med      Misc Natural Products (PROSTATE SUPPORT PO) Take by mouth 2 times dailyHistorical Med      vitamin D-3 (CHOLECALCIFEROL) 5000 UNITS TABS Take 5,000 Units by mouth daily. methotrexate (RHEUMATREX) 2.5 MG chemo tablet Take 4 tablets by mouth once a week., Disp-16 tablet, R-0      aspirin 81 MG tablet Take 81 mg by mouth daily. magnesium oxide (MAG-OX) 400 MG tablet Take 400 mg by mouth daily.                ALLERGIES     Seasonal and Statins    FAMILY HISTORY       Family History   Problem Relation Age of Onset    Heart Attack Mother     Cancer Father         LYMPHOMA    Heart Disease Sister         pacemaker          SOCIAL HISTORY       Social History     Socioeconomic History    Marital status:      Spouse name: None    Number of children: None    Years of education: None    Highest education level: None   Occupational History    None   Tobacco Use    Smoking status: Former Smoker     Packs/day: 1.00     Years: 25.00     Pack years: 25.00     Types: Cigarettes     Quit date: 1992     Years since quittin.9  Smokeless tobacco: Never Used   Substance and Sexual Activity    Alcohol use: Not Currently     Comment: occasional     Drug use: No    Sexual activity: None   Other Topics Concern    None   Social History Narrative    None     Social Determinants of Health     Financial Resource Strain:     Difficulty of Paying Living Expenses: Not on file   Food Insecurity:     Worried About Running Out of Food in the Last Year: Not on file    [place] of Food in the Last Year: Not on file   Transportation Needs:     Lack of Transportation (Medical): Not on file    Lack of Transportation (Non-Medical):  Not on file   Physical Activity:     Days of Exercise per Week: Not on file    Minutes of Exercise per Session: Not on file   Stress:     Feeling of Stress : Not on file   Social Connections:     Frequency of Communication with Friends and Family: Not on file    Frequency of Social Gatherings with Friends and Family: Not on file    Attends Buddhist Services: Not on file    Active Member of [address] [place] [address] or Organizations: Not on file    Attends Club or Organization Meetings: Not on file    Marital Status: Not on file   Intimate Partner Violence:     Fear of Current or Ex-Partner: Not on file    Emotionally Abused: Not on file    Physically Abused: Not on file    Sexually Abused: Not on file   Housing Stability:     Unable to Pay for Housing in the Last Year: Not on file    Number of [place] in the Last Year: Not on file    Unstable Housing in the Last Year: Not on file       SCREENINGS                               CIWA Assessment  BP: 110/75  Pulse: 73                 PHYSICAL EXAM    (up to 7 for level 4, 8 or more for level 5)     ED Triage Vitals   BP Temp Temp Source Pulse Resp SpO2 Height Weight   12/25/21 2124 12/25/21 2122 12/25/21 2122 12/25/21 2122 12/25/21 2122 12/25/21 2124 12/25/21 2122 12/25/21 2122   106/76 98.5 °F (36.9 °C) Oral 75 16 94 % 6' (1.829 m) 200 lb (90.7 kg)       Physical Exam  Constitutional:       General: He is not in acute distress. Appearance: He is well-developed. He is not ill-appearing or toxic-appearing. HENT:      Head: Normocephalic and atraumatic. Right Ear: Tympanic membrane, ear canal and external ear normal.      Left Ear: Tympanic membrane, ear canal and external ear normal.      Nose: Nose normal.      Mouth/Throat:      Mouth: Mucous membranes are moist.   Eyes:      Pupils: Pupils are equal, round, and reactive to light. Cardiovascular:      Rate and Rhythm: Normal rate and regular rhythm. Pulses: Normal pulses. Heart sounds: No murmur heard. No friction rub. No gallop. Pulmonary:      Effort: Pulmonary effort is normal.      Breath sounds: Normal breath sounds. No wheezing, rhonchi or rales. Abdominal:      General: Bowel sounds are normal. There is no distension. Palpations: Abdomen is soft. There is no mass. Tenderness: There is no abdominal tenderness. There is no right CVA tenderness, left CVA tenderness, guarding or rebound. Hernia: No hernia is present. Musculoskeletal:         General: No swelling. Cervical back: Normal and normal range of motion. Thoracic back: Normal.      Lumbar back: Tenderness present. Right lower leg: No edema. Left lower leg: No edema. Comments: Bilateral paraspinal lumbar tenderness    Skin:     General: Skin is warm and dry. Capillary Refill: Capillary refill takes less than 2 seconds. Findings: No rash. Neurological:      General: No focal deficit present. Mental Status: He is alert and oriented to person, place, and time. Cranial Nerves: Cranial nerves are intact. Sensory: Sensation is intact. Motor: Motor function is intact. Coordination: Coordination is intact. Gait: Gait is intact. Deep Tendon Reflexes: Reflexes are normal and symmetric.          DIAGNOSTIC RESULTS     EKG: All EKG's are interpreted by the Emergency Department Physician who either signs or Co-signs this chart in the absence of a cardiologist.    EKG shows NSR with HR 77, normal axis, normal intervals, no ST changes. PVCs. RBBB. RADIOLOGY:   Non-plain film images such as CT, Ultrasound and MRI are read by the radiologist. Plain radiographic images are visualized and preliminarily interpreted by the emergency physician with the below findings:      Interpretation per the Radiologist below, if available at the time of this note:    XR CHEST (2 VW)    (Results Pending)         ED BEDSIDE ULTRASOUND:   Performed by ED Physician - none    LABS:  Labs Reviewed   URINE RT REFLEX TO CULTURE - Abnormal; Notable for the following components:       Result Value    Ketones, Urine TRACE (*)     Leukocyte Esterase, Urine TRACE (*)     All other components within normal limits   CBC WITH AUTO DIFFERENTIAL - Abnormal; Notable for the following components:    WBC 11.2 (*)     RBC 4.33 (*)     Hemoglobin 13.3 (*)     Hematocrit 40.5 (*)     MCHC 32.9 (*)     RDW 16.5 (*)     Neutrophils Absolute 9.1 (*)     Lymphocytes Absolute 0.9 (*)     All other components within normal limits   COMPREHENSIVE METABOLIC PANEL - Abnormal; Notable for the following components:    Glucose 146 (*)     BUN 51 (*)     CREATININE 2.02 (*)     GFR Non- 32.1 (*)     GFR  38.8 (*)     All other components within normal limits   MAGNESIUM - Abnormal; Notable for the following components:    Magnesium 2.5 (*)     All other components within normal limits   MICROSCOPIC URINALYSIS - Abnormal; Notable for the following components:    RBC, UA 3-5 (*)     All other components within normal limits   RAPID INFLUENZA A/B ANTIGENS   COVID-19, RAPID   COVID-19 AMBULATORY   BRAIN NATRIURETIC PEPTIDE   TROPONIN       All other labs were within normal range or not returned as of this dictation.     EMERGENCY DEPARTMENT COURSE and DIFFERENTIAL DIAGNOSIS/MDM:   Vitals: Vitals:    12/25/21 2122 12/25/21 2124 12/25/21 2341   BP:  106/76 110/75   Pulse: 75  73   Resp: 16  17   Temp: 98.5 °F (36.9 °C)  98.5 °F (36.9 °C)   TempSrc: Oral  Oral   SpO2:  94% 98%   Weight: 200 lb (90.7 kg)     Height: 6' (1.829 m)         MDM    Patient is a 26-year-old male presents the ED for evaluation of cough shortness of breath urinary frequency back pain. He is afebrile and hemodynamically stable. He was treated with p.o. Tessalon p.o. Mucinex p.o. Norco and p.o. Zofran and po prednisone in the ED. EKG shows NSR with HR 77, normal axis, normal intervals, no ST changes. PVCs. RBBB. Labs are unremarkable. Covid and flu negative. UA is negative for UTI. Chest x-ray NAD. We will treat for bronchitis with COPD exacerbation. Follow-up with urology and pulmonology in 1 to 2 days return to the ED for worsening symptoms given warning signs for which she should return. Patient understands and agrees to plan. REASSESSMENT          CRITICAL CARE TIME   Total Critical Care time was 0 minutes, excluding separately reportable procedures. There was a high probability of clinically significant/life threatening deterioration in the patient's condition which required my urgent intervention. CONSULTS:  None    PROCEDURES:  Unless otherwise noted below, none     Procedures        FINAL IMPRESSION      1. Cough    2. COPD exacerbation ([address]    3.  Urinary frequency          DISPOSITION/PLAN   DISPOSITION Decision To Discharge 12/25/2021 11:43:06 PM      PATIENT REFERRED TO:  Aaron Ramírez MD  [address] [id number] [place] [address]  [phone]    Schedule an appointment as soon as possible for a visit in 1 day      [facility]) ED  [address] [facility] [id number] [phone]  Go to   As needed, If symptoms worsen      DISCHARGE MEDICATIONS:  Discharge Medication List as of 12/25/2021 11:32 PM      START taking these medications    Details   predniSONE (DELTASONE) 50 MG tablet Take 1 tablet by mouth daily for 4 days, Disp-4 tablet, R-0Print      guaiFENesin (MUCINEX) 600 MG extended release tablet Take 1 tablet by mouth 2 times daily for 15 days, Disp-30 tablet, R-0Print      benzonatate (TESSALON) 100 MG capsule Take 1-2 capsules by mouth 3 times daily as needed for Cough, Disp-42 capsule, R-0Print           Controlled Substances Monitoring:     No flowsheet data found.     (Please note that portions of this note were completed with a voice recognition program.  Efforts were made to edit the dictations but occasionally words are mis-transcribed.)    Javier Rico PA-C (electronically signed)             Javier Rico PA-C  12/26/21 0902

## 2021-12-27 LAB
EKG ATRIAL RATE: 77 BPM
EKG P AXIS: 36 DEGREES
EKG P-R INTERVAL: 164 MS
EKG Q-T INTERVAL: 402 MS
EKG QRS DURATION: 148 MS
EKG QTC CALCULATION (BAZETT): 454 MS
EKG R AXIS: 84 DEGREES
EKG T AXIS: 30 DEGREES
EKG VENTRICULAR RATE: 77 BPM

## 2021-12-27 PROCEDURE — 93010 ELECTROCARDIOGRAM REPORT: CPT | Performed by: INTERNAL MEDICINE

## 2021-12-29 LAB
SARS-COV-2: NOT DETECTED
SOURCE: NORMAL

## 2022-04-01 ENCOUNTER — OFFICE VISIT (OUTPATIENT)
Dept: FAMILY MEDICINE CLINIC | Age: 77
End: 2022-04-01
Payer: MEDICARE

## 2022-04-01 VITALS
DIASTOLIC BLOOD PRESSURE: 60 MMHG | SYSTOLIC BLOOD PRESSURE: 120 MMHG | BODY MASS INDEX: 30.12 KG/M2 | OXYGEN SATURATION: 93 % | HEART RATE: 91 BPM | HEIGHT: 72 IN | WEIGHT: 222.4 LBS | TEMPERATURE: 97 F

## 2022-04-01 DIAGNOSIS — R09.81 NASAL CONGESTION: ICD-10-CM

## 2022-04-01 DIAGNOSIS — J44.9 CHRONIC OBSTRUCTIVE PULMONARY DISEASE, UNSPECIFIED COPD TYPE (HCC): Primary | ICD-10-CM

## 2022-04-01 PROCEDURE — 3023F SPIROM DOC REV: CPT | Performed by: FAMILY MEDICINE

## 2022-04-01 PROCEDURE — G8417 CALC BMI ABV UP PARAM F/U: HCPCS | Performed by: FAMILY MEDICINE

## 2022-04-01 PROCEDURE — 1123F ACP DISCUSS/DSCN MKR DOCD: CPT | Performed by: FAMILY MEDICINE

## 2022-04-01 PROCEDURE — 1036F TOBACCO NON-USER: CPT | Performed by: FAMILY MEDICINE

## 2022-04-01 PROCEDURE — G8427 DOCREV CUR MEDS BY ELIG CLIN: HCPCS | Performed by: FAMILY MEDICINE

## 2022-04-01 PROCEDURE — 4040F PNEUMOC VAC/ADMIN/RCVD: CPT | Performed by: FAMILY MEDICINE

## 2022-04-01 PROCEDURE — 99213 OFFICE O/P EST LOW 20 MIN: CPT | Performed by: FAMILY MEDICINE

## 2022-04-01 RX ORDER — AZELASTINE 1 MG/ML
1 SPRAY, METERED NASAL 2 TIMES DAILY
Qty: 60 ML | Refills: 0 | Status: SHIPPED | OUTPATIENT
Start: 2022-04-01

## 2022-04-01 RX ORDER — GUAIFENESIN 600 MG/1
1200 TABLET, EXTENDED RELEASE ORAL 2 TIMES DAILY PRN
Qty: 40 TABLET | Refills: 0 | Status: SHIPPED | OUTPATIENT
Start: 2022-04-01 | End: 2022-04-11

## 2022-04-01 SDOH — ECONOMIC STABILITY: FOOD INSECURITY: WITHIN THE PAST 12 MONTHS, YOU WORRIED THAT YOUR FOOD WOULD RUN OUT BEFORE YOU GOT MONEY TO BUY MORE.: NEVER TRUE

## 2022-04-01 SDOH — ECONOMIC STABILITY: FOOD INSECURITY: WITHIN THE PAST 12 MONTHS, THE FOOD YOU BOUGHT JUST DIDN'T LAST AND YOU DIDN'T HAVE MONEY TO GET MORE.: NEVER TRUE

## 2022-04-01 ASSESSMENT — PATIENT HEALTH QUESTIONNAIRE - PHQ9
SUM OF ALL RESPONSES TO PHQ9 QUESTIONS 1 & 2: 0
SUM OF ALL RESPONSES TO PHQ QUESTIONS 1-9: 0
1. LITTLE INTEREST OR PLEASURE IN DOING THINGS: 0
2. FEELING DOWN, DEPRESSED OR HOPELESS: 0
SUM OF ALL RESPONSES TO PHQ QUESTIONS 1-9: 0

## 2022-04-01 ASSESSMENT — SOCIAL DETERMINANTS OF HEALTH (SDOH): HOW HARD IS IT FOR YOU TO PAY FOR THE VERY BASICS LIKE FOOD, HOUSING, MEDICAL CARE, AND HEATING?: NOT HARD AT ALL

## 2022-04-01 NOTE — PROGRESS NOTES
Chief Complaint   Patient presents with    Congestion     Pt is here for sinus & head congestion. Went to walk-in 3/26/22 & was given meds (prednisone & doxycyline) but still not feeling better & reports feeling a bit dizzy. HPI: Brandon Hahn 64 y.o. male presenting for     Congestion in nares and chest  Patient complaining of congestion in his chest and in his nares. Patient did go to an urgent care about a week ago and was prescribed doxycycline for 1 week and a steroid Dosepak. Patient reports that his symptoms did improve but he still having a lingering congestion and cough. Patient denies any fevers, chills, nausea, vomiting, chest pain, shortness of breath, abdominal pain, urination, change stools. Patient does have a history of COPD. Patient feels like he is about 66% better. Some of the main concerns that he has to say just feels stuffy. Patient was checked for flu and Covid and they were both negative.           Current Outpatient Medications   Medication Sig Dispense Refill    guaiFENesin (MUCINEX) 600 MG extended release tablet Take 2 tablets by mouth 2 times daily as needed for Congestion 40 tablet 0    azelastine (ASTELIN) 0.1 % nasal spray 1 spray by Nasal route 2 times daily Use in each nostril as directed 60 mL 0    oxyCODONE (ROXICODONE) 5 MG immediate release tablet TAKE 1 TABLET BY MOUTH EVERY 4 HOURS AS NEEDED (pain 7-10) and take 1/2 tablet (pain 4-6)      naproxen (NAPROSYN) 500 MG tablet Take by mouth      docusate sodium (COLACE) 100 MG capsule TAKE 1 CAPSULE BY MOUTH TWICE DAILY      pantoprazole (PROTONIX) 40 MG tablet Take 1 tablet by mouth every morning (before breakfast) 30 tablet 0    tamsulosin (FLOMAX) 0.4 MG capsule Take by mouth      hydroCHLOROthiazide (HYDRODIURIL) 12.5 MG tablet TAKE 1 TABLET BY MOUTH EVERY MORNING      montelukast (SINGULAIR) 10 MG tablet Take 10 mg by mouth nightly      gabapentin (NEURONTIN) 300 MG capsule TAKE 2 CAPSULES BY MOUTH TWICE DAILY  1    metoprolol succinate (TOPROL XL) 25 MG extended release tablet take 1 tablet daily  3    folic acid (FOLVITE) 1 MG tablet Take 1 mg by mouth daily      Nebulizer MISC by Does not apply route      ipratropium-albuterol (DUONEB) 0.5-2.5 (3) MG/3ML SOLN nebulizer solution Inhale 1 vial into the lungs every 4 hours      Misc Natural Products (PROSTATE SUPPORT PO) Take by mouth 2 times daily      vitamin D-3 (CHOLECALCIFEROL) 5000 UNITS TABS Take 5,000 Units by mouth daily.  methotrexate (RHEUMATREX) 2.5 MG chemo tablet Take 4 tablets by mouth once a week. 16 tablet 0    aspirin 81 MG tablet Take 81 mg by mouth daily.  magnesium oxide (MAG-OX) 400 MG tablet Take 400 mg by mouth daily. Current Facility-Administered Medications   Medication Dose Route Frequency Provider Last Rate Last Admin    albuterol (PROVENTIL) nebulizer solution 2.5 mg  2.5 mg Nebulization Once Drusilla Rear, DO            ROS  CONSTITUTIONAL: The patient denies fevers, chills, sweats and body ache. HEENT: Denies headache, blurry vision, eye pain, tinnitus, vertigo,  sore throat, neck or thyroid masses. RESPIRATORY: Cough and congestion. CARDIAC: Denies chest pain, pressure, palpitations, Denies lower extremity edema. GASTROINTESTINAL: Denies abdominal pain, constipation, diarrhea, bleeding in the stools,   GENITOURINARY: Denies dysuria, hematuria, nocturia or frequency, urinary incontinence. NEUROLOGIC: Denies headaches, dizziness, syncope, weakness  MUSCULOSKELETAL: denies changes in range of motion, joint pain, stiffness. ENDOCRINOLOGY: Denies heat or cold intolerance. HEMATOLOGY: Denies easy bleeding or blood transfusion,anemia  DERMATOLOGY: Denies changes in moles or pigmentation changes. PSYCHIATRY: Denies depression, agitation or anxiety.     Past Medical History:   Diagnosis Date    Arthritis     R/A    CAD (coronary artery disease)     cardiac stents x 3    Chronic back pain     COPD (chronic obstructive pulmonary disease) (HCC)     past smoker > 25 yr habit / quit 25 yrs ago    COPD [facility] disease) [facility] [address]     Coronary artery disease     Hemorrhoids     Hyperlipidemia     past trx / cannot tolerate statins    Hypertension     meds > 10 yrs / denies TIA or stroke    Neuropathy     PMR [facility]algia [facility]a) [facility]     Restless leg syndrome         Past Surgical History:   Procedure Laterality Date    CARDIAC SURGERY      2-3 cardiac stents    CARPAL TUNNEL RELEASE      EYE SURGERY  right    retina hole / Phaco with IOL     OK REVISE MEDIAN N/CARPAL TUNNEL SURG Right 2018    RIGHT WRIST CARPAL TUNNEL RELEASE performed by Desiree Humphrey MD at [address] [place] [address] [facility]      2-3 cardiac stents        Family History   Problem Relation Age of Onset    Heart Attack Mother     Cancer Father         LYMPHOMA    Heart Disease Sister         pacemaker        Social History     Socioeconomic History    Marital status:      Spouse name: Not on file    Number of children: Not on file    Years of education: Not on file    Highest education level: Not on file   Occupational History    Not on file   Tobacco Use    Smoking status: Former Smoker     Packs/day: 1.00     Years: 25.00     Pack years: 25.00     Types: Cigarettes     Quit date: 1992     Years since quittin.1    Smokeless tobacco: Never Used   Substance and Sexual Activity    Alcohol use: Not Currently     Comment: occasional     Drug use: No    Sexual activity: Not on file   Other Topics Concern    Not on file   Social History Narrative    Not on file     Social Determinants of Health     Financial Resource Strain: Low Risk     Difficulty of Paying Living Expenses: Not hard at all   Food Insecurity: No Food Insecurity    Worried About Running Out of Food in the Last Year: Never true    [place] of Food in the Last Year: Never true   Transportation Needs:     Lack of Transportation (Medical): Not on file    Lack of Transportation (Non-Medical): Not on file   Physical Activity:     Days of Exercise per Week: Not on file    Minutes of Exercise per Session: Not on file   Stress:     Feeling of Stress : Not on file   Social Connections:     Frequency of Communication with Friends and Family: Not on file    Frequency of Social Gatherings with Friends and Family: Not on file    Attends Sikh Services: Not on file    Active Member of [address] [place] [address] or Organizations: Not on file    Attends Club or Organization Meetings: Not on file    Marital Status: Not on file   Intimate Partner Violence:     Fear of Current or Ex-Partner: Not on file    Emotionally Abused: Not on file    Physically Abused: Not on file    Sexually Abused: Not on file   Housing Stability:     Unable to Pay for Housing in the Last Year: Not on file    Number of [place] in the Last Year: Not on file    Unstable Housing in the Last Year: Not on file        /60   Pulse 91   Temp 97 °F (36.1 °C) (Temporal)   Ht 6' (1.829 m)   Wt 222 lb 6.4 oz (100.9 kg)   SpO2 93%   BMI 30.16 kg/m²        Physical Exam:    General appearance - alert, well appearing, and in no distress  Mental Status - alert, oriented to person, place, and time  Eyes - pupils equal and reactive, extraocular eye movements intact   Ears - bilateral TM's and external ear canals normal   Nose - normal and patent, no erythema, discharge or polyps   Sinuses - Normal paranasal sinuses without tenderness   Throat - mucous membranes moist, pharynx normal without lesions   Neck - supple, no significant adenopathy   Thyroid - thyroid is normal in size without nodules or tenderness    Chest -congestion and adventitious sounds heard in the lung fields bilaterally.   Heart - normal rate, regular rhythm, normal S1, S2, no murmurs, rubs, clicks or gallops  Abdomen - soft, nontender, nondistended, no masses or organomegaly   Back exam - full range of motion, no tenderness, palpable spasm or pain on motion   Neurological - alert, oriented, normal speech, no focal findings or movement disorder noted   Musculoskeletal - no joint tenderness, deformity or swelling   Extremities - peripheral pulses normal, no pedal edema, no clubbing or cyanosis   Skin - normal coloration and turgor, no rashes, no suspicious skin lesions noted        Labs   No results found for: TSHREFLEX  TSH   Date Value Ref Range Status   03/29/2021 1.860 0.440 - 3.860 uIU/mL Final   10/16/2020 2.250 0.440 - 3.860 uIU/mL Final   12/05/2013 1.130 0.270 - 4.200 uIU/mL Final   07/10/2012 0.837 0.550 - 4.780 uIU/mL Final     Lab Results   Component Value Date     02/21/2022    K 4.9 02/21/2022     02/21/2022    CO2 23 12/25/2021    BUN 51 (H) 12/25/2021    CREATININE 1.47 (H) 02/21/2022    GLUCOSE 92 02/21/2022    CALCIUM 9.8 02/21/2022    PROT 7.1 02/21/2022    LABALBU 4.2 02/21/2022    BILITOT 0.4 02/21/2022    ALKPHOS 46 02/21/2022    AST 27 02/21/2022    ALT 22 02/21/2022    LABGLOM 32.1 (L) 12/25/2021    GFRAA 38.8 (L) 12/25/2021    AGRATIO 1.4 06/20/2018    GLOB 3.3 12/25/2021       . lastbc  Lab Results   Component Value Date    LABA1C 6.1 (H) 02/22/2019     No results found for: EAG  Lab Results   Component Value Date    WBC 8.5 02/21/2022    HGB 15.3 02/21/2022    HCT 46.9 02/21/2022    MCV 99 02/21/2022     02/21/2022           A/P: Andreina Haskins Starcher 64 y.o. male presenting for     1. Chronic obstructive pulmonary disease, unspecified COPD type ([address]  Patient recently finished a dose of steroids and antibiotics. Patient does have congestion on exam.  We will get an x-ray of his lungs before considering antibiotics. Patient is sitting afebrile normal feels better but just reports that he would like to be 100% better. - XR CHEST STANDARD (2 VW); Future  - guaiFENesin (MUCINEX) 600 MG extended release tablet;  Take 2 tablets by mouth 2 times daily as needed for Congestion Dispense: 40 tablet; Refill: 0  - azelastine (ASTELIN) 0.1 % nasal spray; 1 spray by Nasal route 2 times daily Use in each nostril as directed  Dispense: 60 mL; Refill: 0    2. Nasal congestion  No signs of a sinus infection. Ears look clean. Patient admits to some congestion. Will give a medication to help with congestion.  - guaiFENesin (MUCINEX) 600 MG extended release tablet; Take 2 tablets by mouth 2 times daily as needed for Congestion  Dispense: 40 tablet; Refill: 0  - azelastine (ASTELIN) 0.1 % nasal spray; 1 spray by Nasal route 2 times daily Use in each nostril as directed  Dispense: 60 mL; Refill: 0          Please note, this report has been partially produced using speech recognition software  and may cause  and /or contain errors related to that system including grammar, punctuation and spelling as well as words and phrases that may seem inappropriate. If there are questions or concerns please feel free to contact me to clarify.

## 2022-04-19 ENCOUNTER — TELEPHONE (OUTPATIENT)
Dept: PRIMARY CARE CLINIC | Age: 77
End: 2022-04-19

## 2022-04-19 NOTE — TELEPHONE ENCOUNTER
Spoke with patient concerning AWV, he has declined today as he is very busy.  He would like a call back and says any other day is better but earlier in the am.

## 2022-07-05 ENCOUNTER — TELEPHONE (OUTPATIENT)
Dept: PRIMARY CARE CLINIC | Age: 77
End: 2022-07-05

## 2022-09-06 ENCOUNTER — HOSPITAL ENCOUNTER (OUTPATIENT)
Dept: PHYSICAL THERAPY | Age: 77
Setting detail: THERAPIES SERIES
Discharge: HOME | End: 2022-09-06
Payer: MEDICARE

## 2022-09-06 PROCEDURE — 97110 THERAPEUTIC EXERCISES: CPT

## 2022-09-06 PROCEDURE — 97162 PT EVAL MOD COMPLEX 30 MIN: CPT

## 2022-09-06 PROCEDURE — 97530 THERAPEUTIC ACTIVITIES: CPT

## 2022-09-06 ASSESSMENT — PAIN DESCRIPTION - ORIENTATION: ORIENTATION: RIGHT;LEFT

## 2022-09-06 ASSESSMENT — PAIN SCALES - GENERAL: PAINLEVEL_OUTOF10: 3

## 2022-09-06 ASSESSMENT — PAIN DESCRIPTION - LOCATION: LOCATION: BACK

## 2022-09-06 ASSESSMENT — PAIN DESCRIPTION - PAIN TYPE: TYPE: CHRONIC PAIN

## 2022-09-06 NOTE — PROGRESS NOTES
Physical Therapy: Initial Evaluation    Patient: Wilton Hendrickson (37 y.o. male)   Examination Date:   Plan of Care Certification Period: 2022 to 10/06/22      :  1945 ;    Confirmed: Yes MRN: [id number]  CSN: [id number]   Insurance: Payor: MEDICARE / Plan: MEDICARE PART A AND B / Product Type: *No Product type* /   Insurance ID: [id number] - (Medicare) Secondary Insurance (if applicable):  [facility]   Referring Physician: MD Dr Carmella Fournier July   PCP: Kirsten Chatman MD Visits to Date/Visits Approved:     No Show/Cancelled Appts:      Medical Diagnosis: Lumbar post-laminectomy syndrome [M96.1] LBP post laminectomy  Treatment Diagnosis: LBP chronic, post laminectomy in ,  with decreased  posture and decreased core strength     PERTINENT MEDICAL HISTORY      Self reported health status[de-identified] Fair    Medical History: Chart Reviewed: Yes   Past Medical History:   Diagnosis Date    Arthritis     R/A    CAD (coronary artery disease)     cardiac stents x 3    Chronic back pain     COPD (chronic obstructive pulmonary disease) ([facility] [address]     past smoker > 25 yr habit / quit 25 yrs ago    COPD (chronic obstructive pulmonary disease) ([facility] [address]     Coronary artery disease     Hemorrhoids     Hyperlipidemia     past trx / cannot tolerate statins    Hypertension     meds > 10 yrs / denies TIA or stroke    Neuropathy     PMR (polymyalgia rheumatica) ([facility])     Restless leg syndrome      Surgical History:   Past Surgical History:   Procedure Laterality Date    CARDIAC SURGERY      2-3 cardiac stents    CARPAL TUNNEL RELEASE      EYE SURGERY  right    retina hole / Phaco with IOL     [place] REVISE MEDIAN N/CARPAL TUNNEL SURG Right 2018    RIGHT WRIST CARPAL TUNNEL RELEASE performed by Racheal Miller MD at [facility]    [facility]      2-3 cardiac stents       Medications:   Current Outpatient Medications:     azelastine (ASTELIN) 0.1 % nasal spray, 1 spray by Nasal route 2 times daily Use in each nostril as directed, Disp: 60 mL, Rfl: 0    oxyCODONE (ROXICODONE) 5 MG immediate release tablet, TAKE 1 TABLET BY MOUTH EVERY 4 HOURS AS NEEDED (pain 7-10) and take 1/2 tablet (pain 4-6), Disp: , Rfl:     naproxen (NAPROSYN) 500 MG tablet, Take by mouth, Disp: , Rfl:     docusate sodium (COLACE) 100 MG capsule, TAKE 1 CAPSULE BY MOUTH TWICE DAILY, Disp: , Rfl:     pantoprazole (PROTONIX) 40 MG tablet, Take 1 tablet by mouth every morning (before breakfast), Disp: 30 tablet, Rfl: 0    tamsulosin (FLOMAX) 0.4 MG capsule, Take by mouth, Disp: , Rfl:     hydroCHLOROthiazide (HYDRODIURIL) 12.5 MG tablet, TAKE 1 TABLET BY MOUTH EVERY MORNING, Disp: , Rfl:     montelukast (SINGULAIR) 10 MG tablet, Take 10 mg by mouth nightly, Disp: , Rfl:     gabapentin (NEURONTIN) 300 MG capsule, TAKE 2 CAPSULES BY MOUTH TWICE DAILY, Disp: , Rfl: 1    metoprolol succinate (TOPROL XL) 25 MG extended release tablet, take 1 tablet daily, Disp: , Rfl: 3    folic acid (FOLVITE) 1 MG tablet, Take 1 mg by mouth daily, Disp: , Rfl:     Nebulizer MISC, by Does not apply route, Disp: , Rfl:     ipratropium-albuterol (DUONEB) 0.5-2.5 (3) MG/3ML SOLN nebulizer solution, Inhale 1 vial into the lungs every 4 hours, Disp: , Rfl:     Misc Natural Products (PROSTATE SUPPORT PO), Take by mouth 2 times daily, Disp: , Rfl:     vitamin D-3 (CHOLECALCIFEROL) 5000 UNITS TABS, Take 5,000 Units by mouth daily. , Disp: , Rfl:     methotrexate (RHEUMATREX) 2.5 MG chemo tablet, Take 4 tablets by mouth once a week., Disp: 16 tablet, Rfl: 0    aspirin 81 MG tablet, Take 81 mg by mouth daily. , Disp: , Rfl:     magnesium oxide (MAG-OX) 400 MG tablet, Take 400 mg by mouth daily. , Disp: , Rfl:     Current Facility-Administered Medications:     albuterol (PROVENTIL) nebulizer solution 2.5 mg, 2.5 mg, Nebulization, Once, Willy Centeno,   Allergies: Seasonal and Statins      SUBJECTIVE EXAMINATION      ,           Subjective History:  Onset Date: 08/30/22  Subjective: \"I really don't like exercising. I don't like my back hurting. \"  Additional Pertinent Hx (if applicable): Dr Apoorva Egan laminectomy in Aug- Sept 2021- pt doesn't remember exact surgery date, minor change in back pain/ less pain down legs post surgery . Currently still persisting achy  low back pain; yard is about 6 acres- bednign over is the hardest-  picking up sticks.    Previous treatments prior to current episode?: Outpatient PT      Learning/Language:       Pain Screening   Pain Screening  Patient Currently in Pain: Yes  Pain Assessment: 0-10  Pain Level: 3  Best Pain Level: 0 (sitting in recliner)  Worst Pain Level: 7 (past month, working out in yard 3 hour zero turn mower- sitting to cut acres of grass)  Date pain first started: 08/30/22 (date of MD order)  Pain Type: Chronic pain  Pain Location: Back  Pain Orientation: Right, Left (R>L, R handed)  Pain Radiating Towards: less radiationinto LE since surgery - decreased sciatica post  Pain Descriptors: Aching, Sore, Tightness, Nagging    Functional Status         Social History:  Social History  Lives With: Spouse  Type of Home: House (multiacres- does 6 acres of yard care with zero turn)    Occupation/Interests:  Occupation: Retired  Type of Occupation:   Leisure & Hobbies: yardwork 3-54 hours of yardwork at a t [employer], wtih breaks every 30-60 minutes, grass cutting solid 3 hours      Current Level of Function:         ADL Assistance: Independent  Homemaking Assistance: Independent  Ambulation Assistance: Independent  Transfer Assistance: Independent    OBJECTIVE EXAMINATION   RObservations: mild slouched posture, worse in sitting        Palpation:   Lumbar Spine Palpation: sacrum negative in prone all planes, no reproduction of pain with grade 1 and 2 mobs along lumbar spine, long healed low back surgery incision approx 8 inches- pt reprts laminecotmy - but does not knowhow man levels or what levels of lumbar in Aug/Sept 2021    Ambulation/Gait (if applicable):  Ambulation  Surface: carpet  Device: No Device  Assistance: Independent  Quality of Gait: mild Trendelenberg B, L shoulder depressed compared to R- R dominant, wide SAMEER, mild bow legged, good pace, pt has minor balance issues with neuropathy in B feet but no LOB with gait trial.  Distance: 120 ft  Stairs/Curb  Stairs?: Yes (house 4 levels, COPD bothers if does more than one floor)    Balance Screen:       Left Strength  Right Strength      General Strength Testing LE:  (sitting)  Strength LLE  L Hip Flexion: 4+/5  L Hip Extension: 4-/5  L Hip ABduction: 4+/5  L Hip ADduction: 4+/5  L Knee Flexion: 4/5  L Knee Extension: 4+/5  L Ankle Dorsiflexion: 4-/5 (limtied AROM to approx 5 deg DF)  L Ankle Plantar Flexion: 4+/5 General Strength Testing LE:  (sitting)  Strength RLE  R Hip Flexion: 4+/5  R Hip Extension: 4-/5  R Hip ABduction: 4+/5  R Hip ADduction: 4+/5  R Knee Flexion: 4/5  R Knee Extension: 5/5  R Ankle Dorsiflexion: 4-/5 (limtied AROM to approx 5 deg DF)  R Ankle Plantar flexion: 4+/5       Lumbar Assessment   AROM Lumbar Spine   Lumbar spine general AROM: flex 70%, LSB and RSB 50% with mild increased pian LROT and RROT 75% with no change in pain , lumbar extension to neutral with inc pain       Trunk Strength      Limited core and trunk extensors     Muscle Length/Flexibility: Muscle Length LE  General Muscle Length - LE:  (hamstring 90/90 L - 39 deg, R - 45 deg ; SLR approx 70 deg B with tightness ,no inc inbback pain with SLR)        Special Tests:   Special Tests Lumbar Spine  Lumbar Special Tests: Performed  LYNDA Test: R (-), L (-)  SLR: R (-), L (-) (tight only)  S. I Prone Extension: (-)     ASSESSMENT     Impression: Assessment: 77yom with long hsitory of LBP, had laminectomy by Dr Taylor Choudhary in 2021, to pain managemtn with referral for PT 2x week for 8 weeks. Pt relates not likeing exercises- educated will try to focus more on funcitonal intermittent exercises throughout the day.  Pt noted to have decreased posture, hip ext wekaness, decreased posture. Radicular pain better since surgery last year. Pt with neuropathy B feet and hisotry of COPD. Pt appriopriate for skilled OP PT to improve posture core/LE strength,  and  ga t while decreasing LBP if pt complys with education and HEP/funcitonal CHINTAN. Pt agrees to HEP and goals. Body Structures, Functions, Activity Limitations Requiring Skilled Therapeutic Intervention: Decreased functional mobility , Decreased ROM, Decreased strength, Decreased posture, Increased pain    Statement of Medical Necessity: Physical Therapy is both indicated and medically necessary as outlined in the POC to increase the likelihood of meeting the functionally related goals stated below. Patient's Activity Tolerance: Patient tolerated treatment well Pt resitive to CHINTAN- \"I don''t really like to do them. \"- focus on CHINTAN intermittent and functional throughout day is plan. Patient's rehabilitation potential/prognosis is considered to be: Factors which may impact rehabilitation potential include:          GOALS   Patient Goal(s): \"I would like less pain in my back, but I don't like to do lots of exercises. \"  Short Term Goals Completed by 1-2 weeks Goal Status   Pt reporting HEP at least once a day 4/7 days for less back pain  and better psoture/breathing     Increase B hamstring 90/90 >= 5 deg forr less pressure  on pelvis and lower spine for less back pain     Pt able to walk 440 ft wtih pain post <= 2/10 demonstrating better upright posture with shoulders back and more level.      Pt reporting any decrease in LBP                                             Long Term Goals Completed by 8 weeks per MD orders Goal Status   Increase core strength any amount as noted in taller posture with shoulders back and mild lumbar arch with sitting, standing and walking           6 minute walk test, taller psoture >= 1000ft with pain post in low back <= 1/10     Oswestry <= 25% disability form 42% disabiltiy at [facility] on 9/6/22     Pt competent for advanced HEP for core, posture and strengthening of LE hip ext especially                                        TREATMENT PLAN            Pt. actively involved in establishing Plan of Care and Goals: Yes  Patient/ Caregiver education and instruction: Goals, PT Role, Plan of Care, Evaluative findings, Home Exercise Program, Posture, Functional Mobility Training             Treatment may include any combination of the following: Strengthening, ROM, Gait training, Stair training, Functional mobility training, Endurance training, Patient/Caregiver education & training, Equipment evaluation, education, & procurement, Modalities, Positioning, Manual Therapy - Soft Tissue Mobilization, Therapeutic activities     Frequency / Duration:  Patient to be seen 1-2x week for 8 weeks weeks      [facility] Complexity:  moderate       PT Treatment Completed:  Exercises:      Treatment Reasoning    Exercise 1: tall sit with 5 deep breaths and tall posutre face forwrd, shoudlers back  Exercise 2: tall sit 5 hold and slouch 5 x 5 reps  Exercise 3: seated hamstring stretch with tall posture  30 sec x 2 each leg alternating  Exercise 4: supine hooklying rotations 2-3 hold x 10 reps  Exercise 5: prone alt hip ext 1-2 hold x 5 reps each leg                          Therapeutic Activities: (CPT 33209) Treatment Reasoning    Ther Act Exercise 1: education posture, back protection ( handout) , HEP(handout), plan, goalas particiation thorughout the day not formal sit down HEP as pt resistannt to lots of exercise \"being honest\"                      Therapy Time  Individual Time In:       800  Individual Time Out:  905  Minutes:  72        Therapist Signature: Ilene Mckenzie PT    Date: 8/7/1050     I certify that the above Therapy Services are being furnished while the patient is under my care. I agree with the treatment plan and certify that this therapy is necessary. Physician's Signature:  ___________________________   Date:_______                                                                   Shaquille Herndon MD        Physician Comments: _______________________________________________    Please sign and return to [facility]. Please fax to the location listed below.  [facility] for this referral!    [id number]  [address] [place] [address] Dr M[address]ERO [id number]  Dept: [phone]  Dept Fax: 21 : [phone]       POC NOTE

## 2022-09-12 ENCOUNTER — HOSPITAL ENCOUNTER (OUTPATIENT)
Dept: PHYSICAL THERAPY | Age: 77
Setting detail: THERAPIES SERIES
Discharge: HOME | End: 2022-09-12
Payer: MEDICARE

## 2022-09-12 PROCEDURE — 97140 MANUAL THERAPY 1/> REGIONS: CPT

## 2022-09-12 PROCEDURE — 97110 THERAPEUTIC EXERCISES: CPT

## 2022-09-12 PROCEDURE — 97530 THERAPEUTIC ACTIVITIES: CPT

## 2022-09-12 ASSESSMENT — PAIN DESCRIPTION - ORIENTATION: ORIENTATION: LOWER

## 2022-09-12 ASSESSMENT — PAIN DESCRIPTION - DESCRIPTORS: DESCRIPTORS: ACHING

## 2022-09-12 ASSESSMENT — PAIN SCALES - GENERAL: PAINLEVEL_OUTOF10: 3

## 2022-09-12 ASSESSMENT — PAIN DESCRIPTION - FREQUENCY: FREQUENCY: CONTINUOUS

## 2022-09-12 ASSESSMENT — PAIN DESCRIPTION - LOCATION: LOCATION: BACK

## 2022-09-12 NOTE — PROGRESS NOTES
Physical Therapy: Daily Note   Patient: Scout Huerta (10 y.o. male)   Examination Date:   Plan of Care/Certification Expiration Date: 10/06/22    No data recorded   :  1945 # of Visits since [facility]:   2   MRN: [id number]  CSN: [id number] Start of Care Date:   2022   Insurance: Payor: MEDICARE / Plan: MEDICARE PART A AND B / Product Type: *No Product type* /   Insurance ID: [id number] - (Medicare) Secondary Insurance (if applicable): [id number] JANKI Chowdary [employer].   Referring Physician: Patricia Cassidy MD     PCP: Jayro Márquez MD Visits to Date/Visits Approved:     No Show/Cancelled Appts: 0 / 0     Medical Diagnosis: Lumbar post-laminectomy syndrome [M96.1]    Treatment Diagnosis: LBP chronic, post laminectomy in ,  with decreased  posture and decreased core strength        SUBJECTIVE EXAMINATION   Pain Level: Pain Screening  Patient Currently in Pain: Yes  Pain Assessment: 0-10  Pain Level: 3  Pain Location: Back  Pain Orientation: Lower  Pain Descriptors: Aching  Pain Frequency: Continuous    Patient Comments: Subjective: Pt. states the one sitting watching the TV    HEP Compliance: Fair        OBJECTIVE EXAMINATION   Restrictions:  No data recorded No data recorded No data recorded              TREATMENT     Exercises:      Treatment Reasoning    Exercise 3: seated hamstring stretch with tall posture  30 sec x 2 each leg alternating  Exercise 4: supine hooklying rotations 5 hold x 10 reps  Exercise 6: Bridge with abd bracing x 10                          Gait: (CPT [id number])  Treatment Reasoning    Gait Training 1: Ambulation 440' no AD thumbs forward gentle abd bracing to focus posture dec strain on spine. No inc in pain                     Manual Therapy: (CPT 41471) Treatment Reasoning     Joint Mobilization: Grade I-III PA mobs lower thoracic and lumbar  Soft Tissue Mobilizaton: MFR lower thoracic spine and lumbosacral spine.  to dec tightness and pain  Other: Passive stetching to B HS low load long duration. Pt Education:         ASSESSMENT     Assessment: Assessment: Pt. reports some exercises are hard to remember to do. Pt. has been stretching HS. Focused on functional exercises and gait this session incorporating thumbs forward and abdominal bracing. \"Thats going to be hard to remember. \"  Pt. educated hinge at hips with bending and to change positions frequently. Good response to manual therapy less tightness post.  Pain holds 3/10. Pt. declined CP. Body Structures, Functions, Activity Limitations Requiring Skilled Therapeutic Intervention: Decreased functional mobility , Decreased ROM, Decreased strength, Decreased posture, Increased pain    Post-Treatment Pain Level: Activity Tolerance: Patient tolerated treatment well    No data recorded     GOALS   Patient goals : \"I would like less pain in my back, but I don't like to do lots of exercises. \"  Short Term Goals Completed by 1-2 weeks Current Status Goal Status   Pt reporting HEP at least once a day 4/7 days for less back pain  and better psoture/breathing       Increase B hamstring 90/90 >= 5 deg forr less pressure  on pelvis and lower spine for less back pain       Pt able to walk 440 ft wtih pain post <= 2/10 demonstrating better upright posture with shoulders back and more level.        Pt reporting any decrease in LBP                                                           Long Term Goals Completed by 8 weeks per MD orders Current Status Goal Status   Increase core strength any amount as noted in taller posture with shoulders back and mild lumbar arch with sitting, standing and walking         Increase B hamstring 90/90 to <= - 30 deg for less pelvic post tilt and less lumbar pressure resulting in less LBP c/o     6 minute walk test, taller psoture >= 1000ft with pain post in low back <= 1/10       Oswestry <= 25% disability form 42% disabiltiy at [facility] on 9/6/22       Pt competent for advanced HEP for core, posture and strengthening of LE hip ext especially                                                    TREATMENT PLAN   Plan Frequency: 1-2x week  Plan weeks: 8 weeks  Current Treatment Recommendations: Strengthening, ROM, Gait training, Stair training, Functional mobility training, Endurance training, Patient/Caregiver education & training, Equipment evaluation, education, & procurement, Modalities, Positioning, Manual Therapy - Soft Tissue Mobilization, Therapeutic activities   Requires PT Follow-Up: Yes       Therapy Time  Individual Time In:       800  Individual Time Out:  109  Minutes:  45        Electronically signed by Katelynn Esparza PTA  on 1/94/5530 at 8:46 AM   POC NOTE  Physical Therapy

## 2022-09-14 ENCOUNTER — HOSPITAL ENCOUNTER (OUTPATIENT)
Dept: PHYSICAL THERAPY | Age: 77
Setting detail: THERAPIES SERIES
Discharge: HOME | End: 2022-09-14
Payer: MEDICARE

## 2022-09-14 PROCEDURE — 97110 THERAPEUTIC EXERCISES: CPT

## 2022-09-14 PROCEDURE — 97530 THERAPEUTIC ACTIVITIES: CPT

## 2022-09-14 PROCEDURE — 97140 MANUAL THERAPY 1/> REGIONS: CPT

## 2022-09-14 PROCEDURE — G0283 ELEC STIM OTHER THAN WOUND: HCPCS

## 2022-09-14 NOTE — PROGRESS NOTES
Physical Therapy: Daily Note   Patient: Luli Canela (38 y.o. male)   Examination Date:   Plan of Care/Certification Expiration Date: 10/06/22    No data recorded   :  1945 # of Visits since [facility]:   3   MRN: [id number]  CSN: [id number] Start of Care Date:   2022   Insurance: Payor: MEDICARE / Plan: MEDICARE PART A AND B / Product Type: *No Product type* /   Insurance ID: [id number] - (Medicare) Secondary Insurance (if applicable): Toni West   Referring Physician: Luis Alberto Oliveira MD     PCP: Liudmila Diaz MD Visits to Date/Visits Approved: 3 / 16    No Show/Cancelled Appts: 0  0     Medical Diagnosis: Lumbar post-laminectomy syndrome [M96.1]    Treatment Diagnosis: LBP chronic, post laminectomy in ,  with decreased  posture and decreased core strength        SUBJECTIVE EXAMINATION   Pain Level:      Patient Comments: Subjective: I am sore in my low back more than normal. I usually have an ache. Now with exercises I am sore. I am sore 4/10. Soreness changes my walk. HEP Compliance: Fair        OBJECTIVE EXAMINATION   Restrictions:  No data recorded No data recorded No data recorded              TREATMENT     Exercises:      Treatment Reasoning    Exercise 1: tall sit with 5 deep breaths and tall posutre face forwrd, shoudlers back, vc's to correct. Exercise 2: tall sit 5 hold and slouch 5 x 5 reps  Exercise 3: seated hamstring stretch with tall posture  30 sec x 3 each leg alternating  Exercise 4: supine hooklying rotations 2-3 hold x 10 reps  Exercise 5: Supine bridge abdominal bracing x10'  Exercise 6: hooklying abdominal bracing with glut squeezing. Therapeutic Activities: (CPT 54312) Treatment Reasoning    Ther Act Exercise 1: Educated postural correction, plan of care, HEP, review back protection. Educated proper supine to sit and reduce crossing legs with sit position to improve postural correction. Pt. reports using HP and not CP for pain relief. Educted pt. on use of HP versus CP to assist with reducing pain and improving functional tolerance. Manual Therapy: (CPT 83790) Treatment Reasoning     Soft Tissue Mobilizaton: MFR lower thoracic spine and lumbosacral spine. to dec tightness and pain  Other: Passive stetching to B HS low load long duration x 3 to decrease tightness. .                      Modalities  Electric Stimulation, unattended:  (CPT [id number]) / [id number]  Treatment Reasoning    Patient Position: Prone  E-stim location: Low back  E-stim specified location: Posterior Lumbar paraspinal region. E-stim type: Interferential (IFC)  E-stim via: 4 Electrode Pads  E-stim Parameters: Intensity 14 mA  Post treatment skin assessment: Intact  Limitations addressed: Pain modulation                     Pt Education:         ASSESSMENT     Assessment: Assessment: Pt. performed and tolerated current ther ex and manual therapy. Pt. demo's muscle guarding of hip and low back region. Added additional abdominal bracing HEP in supine to assist with core strengthening. Educated and reviewed postural correction andback protection. Performed gentle manual STM/MFR to lumbar region and applied E-stim/CP post ther ex to reduce soreness. Pt. reports good response with reduce soreness post intervention. Pt. educated on use of CP versus HP. Body Structures, Functions, Activity Limitations Requiring Skilled Therapeutic Intervention: Decreased functional mobility , Decreased ROM, Decreased strength, Decreased posture, Increased pain    Post-Treatment Pain Level: Activity Tolerance: Patient tolerated treatment well    No data recorded     GOALS   Patient goals : \"I would like less pain in my back, but I don't like to do lots of exercises. \"  Short Term Goals Completed by 1-2 weeks Current Status Goal Status   Pt reporting HEP at least once a day 4/7 days for less back pain  and better psoture/breathing       Increase B hamstring 90/90 >= 5 deg forr less pressure  on pelvis and lower spine for less back pain       Pt able to walk 440 ft wtih pain post <= 2/10 demonstrating better upright posture with shoulders back and more level.        Pt reporting any decrease in LBP                                                           Long Term Goals Completed by 8 weeks per MD orders Current Status Goal Status   Increase core strength any amount as noted in taller posture with shoulders back and mild lumbar arch with sitting, standing and walking         Increase B hamstring 90/90 to <= - 30 deg for less pelvic post tilt and less lumbar pressure resulting in less LBP c/o     6 minute walk test, taller psoture >= 1000ft with pain post in low back <= 1/10       Oswestry <= 25% disability form 42% disabiltiy at [facility] on 9/6/22       Pt competent for advanced HEP for core, posture and strengthening of LE hip ext especially                                                    TREATMENT PLAN   Plan Frequency: 1-2x week  Plan weeks: 8 weeks  Current Treatment Recommendations: Strengthening, ROM, Gait training, Stair training, Functional mobility training, Endurance training, Patient/Caregiver education & training, Equipment evaluation, education, & procurement, Modalities, Positioning, Manual Therapy - Soft Tissue Mobilization, Therapeutic activities           Therapy Time  Individual Time In:  800       Individual Time Out:  900  Minutes:  60        Electronically signed by Peewee Raymundo PTA  on 9/14/2022 at 9:19 AM   POC NOTE  Physical Therapy

## 2022-09-19 ENCOUNTER — HOSPITAL ENCOUNTER (OUTPATIENT)
Dept: PHYSICAL THERAPY | Age: 77
Setting detail: THERAPIES SERIES
Discharge: HOME | End: 2022-09-19
Payer: MEDICARE

## 2022-09-19 PROCEDURE — G0283 ELEC STIM OTHER THAN WOUND: HCPCS

## 2022-09-19 PROCEDURE — 97530 THERAPEUTIC ACTIVITIES: CPT

## 2022-09-19 PROCEDURE — 97110 THERAPEUTIC EXERCISES: CPT

## 2022-09-19 ASSESSMENT — PAIN SCALES - GENERAL: PAINLEVEL_OUTOF10: 3

## 2022-09-19 NOTE — PROGRESS NOTES
Physical Therapy: Daily Note   Patient: Pepe Taylor (6 y.o. male)   Examination Date:   Plan of Care/Certification Expiration Date: 10/06/22    No data recorded   :  1945 # of Visits since [facility]:   4   MRN: [id number]  CSN: [id number] Start of Care Date:   2022   Insurance: Payor: MEDICARE / Plan: MEDICARE PART A AND B / Product Type: *No Product type* /   Insurance ID: [id number] - (Medicare) Secondary Insurance (if applicable): Izabella Edwards   Referring Physician: Addie Griggs MD     PCP: Bernard Altamirano MD Visits to Date/Visits Approved:     No Show/Cancelled Appts: 0  0     Medical Diagnosis: Lumbar post-laminectomy syndrome [M96.1]    Treatment Diagnosis: LBP chronic, post laminectomy in ,  with decreased  posture and decreased core strength        SUBJECTIVE EXAMINATION   Pain Level: Pain Screening  Patient Currently in Pain: Yes  Pain Assessment: 0-10  Pain Level: 3 (low back to B hips)    Patient Comments: Subjective: Pt states, \"I feel pretty good\". Pt states the estim made his back feel 50% better. Pt states he had to weed wack for 3-4 hours. Pt states, \"No one else will do it\". HEP Compliance: Fair        OBJECTIVE EXAMINATION   Restrictions:      TREATMENT     Exercises:      Treatment Reasoning    Exercise 1: SKTC; H30'' x 3  Exercise 2: LTR; H10'' x 3  Exercise 3: pelvic tilt H3'' x 10  Exercise 4: bridge with abdominal bracing; H1-3'' x 10 (occasional cramping in hamstring, dec hold=tolerance)  Exercise 5: 90/90 hamstring stretch; H30'' x 3 (R HS lacking 28 deg, L HS lacking 30 deg)  Exercise 6: SLR with abdominal bracing; H3'' x 10                          Modalities  Electric Stimulation, unattended:  (CPT [id number]) /   Treatment Reasoning    Patient Position: Prone  E-stim location: Low back  E-stim specified location: Posterior Lumbar paraspinal region. E-stim type:  Interferential (IFC)  E-stim via: 4 Electrode Pads  E-stim Parameters: Intensity 18 mA  Post treatment skin assessment: Intact  Limitations addressed: Pain modulation                     Pt Education:    Educated pt on use of TENS unit for pain relief. ASSESSMENT     Assessment: Assessment: Pt educated on purchasing TENS unit for home and issued handout. Pt educated throughout tx on importance of various stretches and ther ex. Pt able to progress with strengthening with no inc in pain. Donned estim with CP post for pain relief. Pain level 2-3/10 post. Continue with POC. Body Structures, Functions, Activity Limitations Requiring Skilled Therapeutic Intervention: Decreased functional mobility , Decreased ROM, Decreased strength, Decreased posture, Increased pain    Post-Treatment Pain Level: Activity Tolerance: Patient tolerated treatment well    No data recorded     GOALS   Patient goals : \"I would like less pain in my back, but I don't like to do lots of exercises. \"  Short Term Goals Completed by 1-2 weeks Current Status Goal Status   Pt reporting HEP at least once a day 4/7 days for less back pain  and better psoture/breathing       Increase B hamstring 90/90 >= 5 deg forr less pressure  on pelvis and lower spine for less back pain       Pt able to walk 440 ft wtih pain post <= 2/10 demonstrating better upright posture with shoulders back and more level.        Pt reporting any decrease in LBP                                                           Long Term Goals Completed by 8 weeks per MD orders Current Status Goal Status   Increase core strength any amount as noted in taller posture with shoulders back and mild lumbar arch with sitting, standing and walking         Increase B hamstring 90/90 to <= - 30 deg for less pelvic post tilt and less lumbar pressure resulting in less LBP c/o     6 minute walk test, taller psoture >= 1000ft with pain post in low back <= 1/10       Oswestry <= 25% disability form 42% disabiltiy at [facility] on 9/6/22       Pt competent for advanced HEP for core, posture and strengthening of LE hip ext especially                                                    TREATMENT PLAN   Plan Frequency: 1-2x week  Plan weeks: 8 weeks  Current Treatment Recommendations: Strengthening, ROM, Gait training, Stair training, Functional mobility training, Endurance training, Patient/Caregiver education & training, Equipment evaluation, education, & procurement, Modalities, Positioning, Manual Therapy - Soft Tissue Mobilization, Therapeutic activities           Therapy Time  Individual Time In:       3172  Individual Time Out:  6613  Minutes:  60        Electronically signed by Bhanu Stinson PTA  on 9/19/2022 at 11:01 AM   POC NOTE

## 2022-09-21 ENCOUNTER — HOSPITAL ENCOUNTER (OUTPATIENT)
Dept: PHYSICAL THERAPY | Age: 77
Setting detail: THERAPIES SERIES
Discharge: HOME | End: 2022-09-21
Payer: MEDICARE

## 2022-09-21 PROCEDURE — 97110 THERAPEUTIC EXERCISES: CPT

## 2022-09-21 PROCEDURE — G0283 ELEC STIM OTHER THAN WOUND: HCPCS

## 2022-09-21 PROCEDURE — 97140 MANUAL THERAPY 1/> REGIONS: CPT

## 2022-09-21 PROCEDURE — 97530 THERAPEUTIC ACTIVITIES: CPT

## 2022-09-21 ASSESSMENT — PAIN SCALES - GENERAL: PAINLEVEL_OUTOF10: 4

## 2022-09-21 ASSESSMENT — PAIN DESCRIPTION - LOCATION: LOCATION: BACK

## 2022-09-21 ASSESSMENT — PAIN DESCRIPTION - DESCRIPTORS: DESCRIPTORS: ACHING

## 2022-09-21 NOTE — PROGRESS NOTES
Physical Therapy: Daily Note   Patient: Hilda John (78 y.o. male)   Examination Date: 3333  Plan of Care/Certification Expiration Date: 10/06/22    No data recorded   :  1945 # of Visits since [facility]:   5   MRN: [id number]  CSN: [id number] Start of Care Date:   2022   Insurance: Payor: MEDICARE / Plan: MEDICARE PART A AND B / Product Type: *No Product type* /   Insurance ID: [id number] - (Medicare) Secondary Insurance (if applicable): [id number] JANKI Chowdary [employer]   Referring Physician: Li Figueroa MD     PCP: Pura Meyers MD Visits to Date/Visits Approved:     No Show/Cancelled Appts: 0 / 0     Medical Diagnosis: Lumbar post-laminectomy syndrome [M96.1]    Treatment Diagnosis: LBP chronic, post laminectomy in ,  with decreased  posture and decreased core strength        SUBJECTIVE EXAMINATION   Pain Level: Pain Screening  Pain Level: 4 (3-4/10)  Pain Location: Back  Pain Descriptors: Aching    Patient Comments: Subjective: Pt reports increased soreness after last session from the exercises, hasn't been doing exercises as often, \"sometimes once, sometimes twice\", Pt reports soreness working aound the house-bending, twisting, lifting, raking, lots of yardwork. HEP Compliance: Good        OBJECTIVE EXAMINATION   Restrictions:  No data recorded No data recorded No data recorded      TREATMENT     Exercises:      Treatment Reasoning    Exercise 1: SKTC; H30'' x 3  Exercise 2: LTR; H10'' x 4  Exercise 3: pelvic tilt H3'' x 10  Exercise 4: bridge with abdominal bracing; H1-2'' x 10  Exercise 5: 90/90 hamstring stretch; H30'' x 3  Exercise 6: SLR with abdominal bracing; H3'' x 5  Exercise 7: seated flex stretch H 15 x 3-4 reps                          Therapeutic Activities: (CPT 95682) Treatment Reasoning    Ther Act Exercise 1: Pt education regarding body mechanics and lifting technique, discussed correct position for raking and picking up sticks avoiding combination of bending and twisting at spine. Educated on importance of frequent breaks between activities and breaking up yardwork, not doing it all at the same time, also encouraged use of CP PRN after acitivities and when experiencing increased soreness. Also discussed home TENS unit as pt reports relief of pain following electric stim. Manual Therapy: (CPT 71525) Treatment Reasoning     Soft Tissue Mobilizaton: MFR lower thoracic spine and lumbosacral spine. to dec tightness and pain                      Modalities  Cryotherapy: (CPT 17497) Treatment Reasoning     CP to lumbar spine with electric stimulation for pain reduction                       Electric Stimulation, unattended:  (CPT A836759) / [id number]  Treatment Reasoning    Patient Position: Prone  E-stim location: Low back  E-stim specified location: Lumbar paraspinals. E-stim type: Interferential (IFC)  E-stim via: 4 Electrode Pads  E-stim Parameters: Intensity 18 mA  Post treatment skin assessment: Intact  Limitations addressed: Pain modulation                     ASSESSMENT     Assessment: Assessment: Pt presented frustrated due to increased soreness after doing yardwork and work around the house. Spent time eduacting and reviewing correct technique for lifting including golfers lift for light lifting and reinforcing importance of frequent breaks between activities. Focused mostly on stretching exs due to increased soreness and tolerated 2-3 gentle core strengthenening exs with short hold time focusing on abd bracing well without increased pain. Manual therapy performed along with and electric stimulation with CP at end of session for pain reduction. Pain decreased to 2/10 post session.   Body Structures, Functions, Activity Limitations Requiring Skilled Therapeutic Intervention: Decreased functional mobility , Decreased ROM, Decreased strength, Decreased posture, Increased pain    Post-Treatment Pain Level:  2    Activity Tolerance: Patient tolerated treatment well    Therapy

## 2022-09-26 ENCOUNTER — HOSPITAL ENCOUNTER (OUTPATIENT)
Dept: PHYSICAL THERAPY | Age: 77
Setting detail: THERAPIES SERIES
Discharge: HOME | End: 2022-09-26
Payer: MEDICARE

## 2022-09-26 PROCEDURE — 97110 THERAPEUTIC EXERCISES: CPT

## 2022-09-26 PROCEDURE — G0283 ELEC STIM OTHER THAN WOUND: HCPCS

## 2022-09-26 ASSESSMENT — PAIN DESCRIPTION - DESCRIPTORS: DESCRIPTORS: ACHING

## 2022-09-26 ASSESSMENT — PAIN DESCRIPTION - LOCATION: LOCATION: BACK

## 2022-09-26 ASSESSMENT — PAIN DESCRIPTION - PAIN TYPE: TYPE: CHRONIC PAIN

## 2022-09-26 ASSESSMENT — PAIN SCALES - GENERAL: PAINLEVEL_OUTOF10: 4

## 2022-09-26 ASSESSMENT — PAIN DESCRIPTION - ORIENTATION: ORIENTATION: RIGHT;LEFT

## 2022-09-26 NOTE — PROGRESS NOTES
Physical Therapy: Daily Note   Patient: Marbin Leiva (89 y.o. male)   Examination Date:   Plan of Care/Certification Expiration Date: 10/06/22    No data recorded   :  1945 # of Visits since [facility]:      MRN: [id number]  CSN: [id number] Start of Care Date:   2022   Insurance: Payor: MEDICARE / Plan: MEDICARE PART A AND B / Product Type: *No Product type* /   Insurance ID: [id number] - (Medicare) Secondary Insurance (if applicable):  [id number] JANKI Chowdary [employer].   Referring Physician: Aguilar Heath MD     PCP: Javi Bradley MD Visits to Date/Visits Approved:     No Show/Cancelled Appts: 0 / 0     Medical Diagnosis: Lumbar post-laminectomy syndrome [M96.1]    Treatment Diagnosis: LBP chronic, post laminectomy in ,  with decreased  posture and decreased core strength        SUBJECTIVE EXAMINATION   Pain Level: Pain Screening  Patient Currently in Pain: Yes  Pain Assessment: 0-10  Pain Level: 4  Patient's Stated Pain Goal: 0 - No pain  Pain Type: Chronic pain  Pain Location: Back  Pain Orientation: Right, Left  Pain Descriptors: Aching    Patient Comments: Subjective: Pt reports that his back pt low back pain 4/10 \"achy\" across the low back    HEP Compliance: Good        OBJECTIVE EXAMINATION   Restrictions:  No data recorded No data recorded No data recorded      TREATMENT     Exercises:      Treatment Reasoning    Exercise 1: SKTC; H30'' x 3  Exercise 2: LTR; H10'' x 4  Exercise 3: pelvic tilt H3-5'' x 10  Exercise 4: bridge with abdominal bracing; H1-2'' x 20  Exercise 6: SLR with abdominal bracing; H3'' x 10  Exercise 8: *Hooklying abd with RTB x 10  Exercise 9: *Heel raises/toes raises  x 10  Exercise 10: *Mini Squats x 10                          Modalities  Cryotherapy: (CPT 64156) Treatment Reasoning     CP to low back with Estim                       Electric Stimulation, unattended:  (CPT 48870  [facility]) /   Treatment Reasoning    Patient Position: Seated  E-stim location: Low back  E-stim specified location: Lumbar paraspinals. E-stim type: Interferential (IFC)  E-stim via: 4 Electrode Pads  E-stim Parameters: Intensity 12 mA  Post treatment skin assessment: Intact  Limitations addressed: Pain modulation  Therapist provided: IFC with CP to low back                    ASSESSMENT     Assessment: Assessment: Pt arrvies to therapy with 4/10 \"achy\" pain across his low back. Pt started therapy with stretches to decrease muscle tightness. Pt performed his supine ther ex for strengthening and standing ther ex. Pt was given RTB for hip abduction exercises and also mini squats and heel/toe raises. Pt received estim to low back with CP post tx session. Pt reports decreased low back pain post tx session. Continue to progress with program.  Body Structures, Functions, Activity Limitations Requiring Skilled Therapeutic Intervention: Decreased functional mobility , Decreased ROM, Decreased strength, Decreased posture, Increased pain      Activity Tolerance: Patient tolerated treatment well    Therapy Prognosis: Good       GOALS   Patient goals : \"I would like less pain in my back, but I don't like to do lots of exercises. \"  Short Term Goals Completed by 1-2 weeks Current Status Goal Status   Pt reporting HEP at least once a day 4/7 days for less back pain  and better psoture/breathing       Increase B hamstring 90/90 >= 5 deg forr less pressure  on pelvis and lower spine for less back pain       Pt able to walk 440 ft wtih pain post <= 2/10 demonstrating better upright posture with shoulders back and more level.        Pt reporting any decrease in LBP                                                           Long Term Goals Completed by 8 weeks per MD orders Current Status Goal Status   Increase core strength any amount as noted in taller posture with shoulders back and mild lumbar arch with sitting, standing and walking         Increase B hamstring 90/90 to <= - 30 deg for less pelvic post tilt and less lumbar pressure resulting in less LBP c/o     6 minute walk test, taller psoture >= 1000ft with pain post in low back <= 1/10       Oswestry <= 25% disability form 42% disabiltiy at eval on 9/6/22       Pt competent for advanced HEP for core, posture and strengthening of LE hip ext especially                                                    TREATMENT PLAN   Plan Frequency: 1-2x week  Plan weeks: 8 weeks  Current Treatment Recommendations: Strengthening, ROM, Gait training, Stair training, Functional mobility training, Endurance training, Patient/Caregiver education & training, Equipment evaluation, education, & procurement, Modalities, Positioning, Manual Therapy - Soft Tissue Mobilization, Therapeutic activities   Requires PT Follow-Up: Yes       Therapy Time  Individual Time In:     8:45am     Individual Time Out:  9:30am   Minutes:   45 min         Electronically signed by Tre Raygoza PT  on 9/26/2022 at 1:30 PM

## 2022-09-27 ENCOUNTER — TELEPHONE (OUTPATIENT)
Dept: FAMILY MEDICINE CLINIC | Age: 77
End: 2022-09-27

## 2022-09-28 ENCOUNTER — HOSPITAL ENCOUNTER (OUTPATIENT)
Dept: PHYSICAL THERAPY | Age: 77
Setting detail: THERAPIES SERIES
Discharge: HOME | End: 2022-09-28
Payer: MEDICARE

## 2022-09-28 PROCEDURE — G0283 ELEC STIM OTHER THAN WOUND: HCPCS

## 2022-09-28 PROCEDURE — 97110 THERAPEUTIC EXERCISES: CPT

## 2022-09-28 ASSESSMENT — PAIN DESCRIPTION - DESCRIPTORS: DESCRIPTORS: ACHING

## 2022-09-28 ASSESSMENT — PAIN SCALES - GENERAL: PAINLEVEL_OUTOF10: 4

## 2022-09-28 ASSESSMENT — PAIN DESCRIPTION - FREQUENCY: FREQUENCY: CONTINUOUS

## 2022-09-28 ASSESSMENT — PAIN DESCRIPTION - PAIN TYPE: TYPE: CHRONIC PAIN

## 2022-09-28 ASSESSMENT — PAIN DESCRIPTION - LOCATION: LOCATION: BACK

## 2022-09-28 ASSESSMENT — PAIN DESCRIPTION - ORIENTATION: ORIENTATION: RIGHT;LEFT;LOWER

## 2022-09-28 NOTE — PROGRESS NOTES
Physical Therapy: Daily Note   Patient: Marbin Leiva (51 y.o. male)   Examination Date:   Plan of Care/Certification Expiration Date: 10/06/22    No data recorded   :  1945 # of Visits since [facility]:   7   MRN: [id number]  CSN: [id number] Start of Care Date:   2022   Insurance: Payor: MEDICARE / Plan: MEDICARE PART A AND B / Product Type: *No Product type* /   Insurance ID: [id number] - (Medicare) Secondary Insurance (if applicable): [employer]   Referring Physician: Adolph Dynes, MD Dr Berenice Hatchet   PCP: Javi Bradley MD Visits to Date/Visits Approved:     No Show/Cancelled Appts: 0 / 0     Medical Diagnosis: Lumbar post-laminectomy syndrome [M96.1]    Treatment Diagnosis: LBP chronic, post laminectomy in ,  with decreased  posture and decreased core strength        SUBJECTIVE EXAMINATION   Pain Level: Pain Screening  Patient Currently in Pain: Yes  Pain Assessment: 0-10  Pain Level: 4  Pain Type: Chronic pain  Pain Location: Back  Pain Orientation: Right, Left, Lower  Pain Descriptors: Aching  Pain Frequency: Continuous    Patient Comments: Subjective: \"The back never quits aching. \"  Pt. states LBP R>L. HEP Compliance: Fair        OBJECTIVE EXAMINATION   Restrictions:  No data recorded No data recorded No data recorded              TREATMENT     Exercises:      Treatment Reasoning    Exercise 1: SKTC; H30'' x 3  Exercise 4: bridge with abdominal bracing; H3-5'' x 20  Exercise 5: 90/90 hamstring stretch; H30'' x 3  Exercise 6: SLR with abdominal bracing; H3-5'' x 10  Exercise 8: Hooklying hip ABD BTB x 20 hold 5 sec  Exercise 9: B shd ext RTB x 10 hold 3 sec with abd bracing  Exercise 10: *Mini Squats x 10 hold 3 sec                          Modalities  Electric Stimulation, unattended:  (CPT [id number]) /   Treatment Reasoning    Patient Position: Seated  E-stim location: Low back  E-stim specified location: Lumbar paraspinals. E-stim type:  Interferential (IFC)  E-stim via: 4 Electrode Pads  E-stim Parameters: Intensity 11 mA  Limitations addressed: Pain modulation  Therapist provided: IFC with CP to low back                     Pt Education:         ASSESSMENT     Assessment: Assessment: Tolerates inc strengtheining no inc in pain. Good releif with TENS at end of session  with CP. No pain post. Pt. has not yet ordered TNES unit for home is working on it but continues to forget. Body Structures, Functions, Activity Limitations Requiring Skilled Therapeutic Intervention: Decreased functional mobility , Decreased ROM, Decreased strength, Decreased posture, Increased pain    Post-Treatment Pain Level: Activity Tolerance: Patient tolerated treatment well    Therapy Prognosis: Good       GOALS   Patient goals : \"I would like less pain in my back, but I don't like to do lots of exercises. \"  Short Term Goals Completed by 1-2 weeks Current Status Goal Status   Pt reporting HEP at least once a day 4/7 days for less back pain  and better psoture/breathing       Increase B hamstring 90/90 >= 5 deg forr less pressure  on pelvis and lower spine for less back pain       Pt able to walk 440 ft wtih pain post <= 2/10 demonstrating better upright posture with shoulders back and more level.        Pt reporting any decrease in LBP                                                           Long Term Goals Completed by 8 weeks per MD orders Current Status Goal Status   Increase core strength any amount as noted in taller posture with shoulders back and mild lumbar arch with sitting, standing and walking         Increase B hamstring 90/90 to <= - 30 deg for less pelvic post tilt and less lumbar pressure resulting in less LBP c/o     6 minute walk test, taller psoture >= 1000ft with pain post in low back <= 1/10       Oswestry <= 25% disability form 42% disabiltiy at [facility] on 9/6/22       Pt competent for advanced HEP for core, posture and strengthening of LE hip ext especially TREATMENT PLAN   Plan Frequency: 1-2x week  Plan weeks: 8 weeks  Current Treatment Recommendations: Strengthening, ROM, Gait training, Stair training, Functional mobility training, Endurance training, Patient/Caregiver education & training, Equipment evaluation, education, & procurement, Modalities, Positioning, Manual Therapy - Soft Tissue Mobilization, Therapeutic activities           Therapy Time  Individual Time In:    704     Individual Time Out:  570  Minutes:  60        Electronically signed by Karolina Felton PTA  on 9/07/2217 at 9:33 AM   POC NOTE  Physical Therapy

## 2022-10-03 ENCOUNTER — HOSPITAL ENCOUNTER (OUTPATIENT)
Dept: PHYSICAL THERAPY | Age: 77
Setting detail: THERAPIES SERIES
Discharge: HOME | End: 2022-10-03
Payer: MEDICARE

## 2022-10-03 PROCEDURE — 97110 THERAPEUTIC EXERCISES: CPT

## 2022-10-03 PROCEDURE — G0283 ELEC STIM OTHER THAN WOUND: HCPCS

## 2022-10-03 ASSESSMENT — PAIN DESCRIPTION - LOCATION: LOCATION: BACK

## 2022-10-03 ASSESSMENT — PAIN DESCRIPTION - ORIENTATION: ORIENTATION: LOWER

## 2022-10-03 ASSESSMENT — PAIN DESCRIPTION - DESCRIPTORS: DESCRIPTORS: ACHING

## 2022-10-03 ASSESSMENT — PAIN SCALES - GENERAL: PAINLEVEL_OUTOF10: 6

## 2022-10-03 NOTE — PROGRESS NOTES
Physical Therapy: Daily Note   Patient: Felisha Lucia (57 y.o. male)   Examination Date:   Plan of Care/Certification Expiration Date: 10/06/22    No data recorded   :  1945 # of Visits since [facility]:   8   MRN: [id number]  CSN: [id number] Start of Care Date:   2022   Insurance: Payor: MEDICARE / Plan: MEDICARE PART A AND B / Product Type: *No Product type* /   Insurance ID: [id number] - (Medicare) Secondary Insurance (if applicable): [id number] JANKI Chowdary Inaika.   Referring Physician: Rexanne Pian, MD Dr Camillia Siemens   PCP: Cynthia Armendariz MD Visits to Date/Visits Approved:     No Show/Cancelled Appts: 0 / 0     Medical Diagnosis: Lumbar post-laminectomy syndrome [M96.1]    Treatment Diagnosis: LBP chronic, post laminectomy in ,  with decreased  posture and decreased core strength        SUBJECTIVE EXAMINATION   Pain Level: Pain Screening  Patient Currently in Pain: Yes  Pain Assessment: 0-10  Pain Level: 6  Pain Location: Back  Pain Orientation: Lower  Pain Descriptors: Aching    Patient Comments: Subjective: Pt states he didn't do anything this weekend because his back hurt. He states he did not ice, do stretches or exercises or even yard work. \"Nothing. \"  Pt states he gets pain more when bending forward. HEP Compliance: Poor        OBJECTIVE EXAMINATION       TREATMENT     Exercises:      Treatment Reasoning    Exercise 1: SKTC; H30'' x 3  Exercise 2: LTR; H30'' x 3  Exercise 4: bridge with abdominal bracing; H5'' 2 x 10  Exercise 5: 90/90 hamstring stretch; H30'' x 3  Exercise 6: SLR with abdominal bracing; H3-5'' x 10  Exercise 7: sidelying hip ABD; H3'' x 10 (VC's for form)                          Modalities  Electric Stimulation, unattended:  (CPT [id number]) /   Treatment Reasoning    Patient Position: Seated  E-stim location: Low back  E-stim specified location: Lumbar paraspinals. E-stim type:  Interferential (IFC)  E-stim via: 4 Electrode Pads  E-stim Parameters: Intensity 13 mA, high, sweep x 15 min  Limitations addressed: Pain modulation  Therapist provided: IFC with CP to low back                     Pt Education:         ASSESSMENT     Assessment: Assessment: Pt feels as though therapy is not helping but was educated at length on importance of compliance witH HEP in order to change pain. Pt educated also on importance of proper body mechanics, icing and hamstring stretching for pain relief. Added sidelying hip abduction with no c/o pain, just fatigue in hips. Pt reports no change in pain post tx. \"Feels the same\". Donned estim post with CP for pain relief. Pain level */10 post.  Body Structures, Functions, Activity Limitations Requiring Skilled Therapeutic Intervention: Decreased functional mobility , Decreased ROM, Decreased strength, Decreased posture, Increased pain    Post-Treatment Pain Level: Activity Tolerance: Patient tolerated treatment well    Therapy Prognosis: Good       GOALS   Patient Goals : \"I would like less pain in my back, but I don't like to do lots of exercises. \"  Short Term Goals Completed by 1-2 weeks Current Status Goal Status   Pt reporting HEP at least once a day 4/7 days for less back pain  and better psoture/breathing       Increase B hamstring 90/90 >= 5 deg forr less pressure  on pelvis and lower spine for less back pain       Pt able to walk 440 ft wtih pain post <= 2/10 demonstrating better upright posture with shoulders back and more level.        Pt reporting any decrease in LBP                                                           Long Term Goals Completed by 8 weeks per MD orders Current Status Goal Status   Increase core strength any amount as noted in taller posture with shoulders back and mild lumbar arch with sitting, standing and walking         Increase B hamstring 90/90 to <= - 30 deg for less pelvic post tilt and less lumbar pressure resulting in less LBP c/o     6 minute walk test, taller psoture >= 1000ft with pain post in low back <= 1/10 Oswestry <= 25% disability form 42% disabiltiy at eval on 9/6/22       Pt competent for advanced HEP for core, posture and strengthening of LE hip ext especially                                                    TREATMENT PLAN   Plan Frequency: 1-2x week  Plan weeks: 8 weeks  Current Treatment Recommendations: Strengthening, ROM, Gait training, Stair training, Functional mobility training, Endurance training, Patient/Caregiver education & training, Equipment evaluation, education, & procurement, Modalities, Positioning, Manual Therapy - Soft Tissue Mobilization, Therapeutic activities           Therapy Time  Individual Time In:       0845  Individual Time Out:  6985  Minutes:  50        Electronically signed by Pavel Ortega PTA  on 10/3/2022 at 9:22 AM   POC NOTE

## 2022-10-04 ENCOUNTER — HOSPITAL ENCOUNTER (OUTPATIENT)
Dept: NEUROLOGY | Age: 77
Discharge: HOME | End: 2022-10-04
Payer: MEDICARE

## 2022-10-04 DIAGNOSIS — M54.10 RADICULITIS: ICD-10-CM

## 2022-10-04 DIAGNOSIS — G90.09 IDIOPATHIC PERIPHERAL AUTONOMIC NEUROPATHY: ICD-10-CM

## 2022-10-04 PROCEDURE — 95912 NRV CNDJ TEST 11-12 STUDIES: CPT

## 2022-10-04 PROCEDURE — 95913 NRV CNDJ TEST 13/> STUDIES: CPT | Performed by: PSYCHIATRY & NEUROLOGY

## 2022-10-04 PROCEDURE — 95886 MUSC TEST DONE W/N TEST COMP: CPT

## 2022-10-04 PROCEDURE — 95886 MUSC TEST DONE W/N TEST COMP: CPT | Performed by: PSYCHIATRY & NEUROLOGY

## 2022-10-04 NOTE — PROCEDURES
[address]                      [address], [address] [facility]                             ELECTROMYOGRAM REPORT    PATIENT NAME: Nu Shearer                  :        1945  MED REC NO:   [id number]                            ROOM:  ACCOUNT NO:   [de-identified]                           ADMIT DATE: 10/04/2022  PROVIDER:     Cesar Fuentes MD    DATE OF EMG:  10/04/2022    INDICATIONS:  Neurophysiological studies are requested for the patient's  underlying numbness in the legs. FINDINGS:  MOTOR NERVE CONDUCTION STUDIES:  Motor nerve conduction study of the  right common peroneal shows borderline latencies with very low amplitude  and decreased velocity. The left common peroneal EDB recording not  recordable. Right tibial nerve recording, not recordable. Left tibial  motor menses shows normal peak latencies and decreased velocity. Right  sural nerve, saphenous bilaterally and superficial peroneal nerves  bilaterally not recordable. The right common peroneal nerve tibialis  anterior, not recordable. The left peroneal nerve tibialis anterior  recording shows normal peak latency, low amplitudes, and normal  conduction velocity. The F-responses appeared to be dispersed and not  reliable. The H-responses are prolonged. Multiple needle electrode examination shows denervation changes in all  the distal muscle groups with chronic denervation more notable on the  right than left. No active denervation is notable. 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  This is superimposed upon probable underlying L5-S1  radicular changes also notable on the right. Common causes of  neuropathy must be ruled out. The vascular etiology also is likely. Multiple sensory nerve conduction studies are evaluated to avoid any  artifact of temperature differences. This test is personally performed by me.     Thank you Dr. Tatiana Napoles for asking us to assist in the care of this patient.     With kindest regards,        Sommer Johnson MD    D: 10/04/2022 10:26:31       T: 10/04/2022 10:28:45     [email or web address]  Job#: [id number]     Doc#: [id number]    CC:

## 2022-10-05 ENCOUNTER — HOSPITAL ENCOUNTER (OUTPATIENT)
Dept: PHYSICAL THERAPY | Age: 77
Setting detail: THERAPIES SERIES
Discharge: HOME | End: 2022-10-05
Payer: MEDICARE

## 2022-10-05 PROCEDURE — 97530 THERAPEUTIC ACTIVITIES: CPT

## 2022-10-05 ASSESSMENT — PAIN SCALES - GENERAL: PAINLEVEL_OUTOF10: 5

## 2022-10-05 ASSESSMENT — PAIN DESCRIPTION - FREQUENCY: FREQUENCY: CONTINUOUS

## 2022-10-05 ASSESSMENT — PAIN DESCRIPTION - PAIN TYPE: TYPE: CHRONIC PAIN

## 2022-10-05 ASSESSMENT — PAIN DESCRIPTION - ORIENTATION: ORIENTATION: RIGHT;LEFT

## 2022-10-05 ASSESSMENT — PAIN DESCRIPTION - LOCATION: LOCATION: BACK

## 2022-10-05 ASSESSMENT — PAIN DESCRIPTION - DESCRIPTORS: DESCRIPTORS: ACHING;DULL

## 2022-10-05 NOTE — PROGRESS NOTES
Physical Therapy    Physical Therapy: Discharge Note   Patient: Cornell Pickens (51 y.o. male)   Examination Date:   Plan of Care/Certification Expiration Date: 22    Progress Note Counter: DC to HEP per pt request     :  1945 # of Visits since [facility]:   9   MRN: [id number]  CSN: [id number] Start of Care Date:   2022   Insurance: Payor: MEDICARE / Plan: MEDICARE PART A AND B / Product Type: *No Product type* /   Insurance ID: [id number] - (Medicare) Secondary Insurance (if applicable): Flor [employer]   Referring Physician: Jyoti Kan MD     PCP: Radha Ching MD Visits to Date/Visits Approved:     No Show/Cancelled Appts: 0 / 0     Medical Diagnosis: Lumbar post-laminectomy syndrome [M96.1]    Treatment Diagnosis: LBP chronic, post laminectomy in ,  with decreased  posture and decreased core strength        SUBJECTIVE EXAMINATION   Pain Level: Pain Screening  Patient Currently in Pain: Yes  Pain Assessment: 0-10  Pain Level: 5  Patient's Stated Pain Goal: 0 - No pain  Pain Type: Chronic pain  Pain Location: Back  Pain Orientation: Right, Left  Pain Descriptors: Aching, Dull  Pain Frequency: Continuous    Patient Comments: Subjective: Pt reports having the same 4-5.10 low back pain and the he had a LE EMG test yesterday and feeling sore today. HEP Compliance: Fair        OBJECTIVE EXAMINATION     Trunk Strength     4+/5     Ambulation/Gait (if applicable):   Ambulation  WB Status: Pt ambulated with fairly equal step length B LE with good stride length B LE and good speed.    Left Strength  Right Strength         Strength LLE  L Hip Flexion: 4+/5  L Hip Extension: 4/5  L Hip ABduction: 4+/5  L Hip ADduction: 4+/5  L Knee Flexion: 4+/5  L Knee Extension: 4+/5  L Ankle Dorsiflexion: 4+/5  L Ankle Plantar Flexion: 4+/5    Strength RLE  R Hip Flexion: 4+/5  R Hip ABduction: 4+/5  R Hip ADduction: 4+/5  R Knee Flexion: 4+/5  R Knee Extension: 5/5  R Ankle Dorsiflexion: 4+/5  R Ankle Plantar flexion: 4+/5       TREATMENT     Gait: (CPT [id number])  Treatment Reasoning    Gait Training 1: Pt ambulated without an AD for 1320' in 6 min with  a good stride but reports increased hip soreness after approx 2 out of 3 laps. Pt Education: Additional Comments: DC to HEP       ASSESSMENT     Assessment: Assessment: Pt arrives to therapy and reports that his back pain had not changed since starting PT. PT and pt discussed at length the importance of doing his execises regularly and making sure that he stretches every day. PT also discussed the need to strengthen his hips to help to decrease his hip soreness. \"I just dont have time\" per pt. PT completed DC measures and discussed to maintain his HEP so that he doesnt lose what his has gained so far. Pt reports possibly returning to PT in the winter \"When Im no so busy\". Pt is now DC to HEP. Post-Treatment Pain Level:  same     Activity Tolerance: Patient tolerated treatment well    Therapy Prognosis: Good       GOALS   Patient Goals : \"I would like less pain in my back, but I don't like to do lots of exercises. \"  Short Term Goals Completed by 1-2 weeks Current Status Goal Status   Pt reporting HEP at least once a day 4/7 days for less back pain  and better psoture/breathing 2 days per week Not Met, In progress   Increase B hamstring 90/90 >= 5 deg forr less pressure  on pelvis and lower spine for less back pain 90-90 degees R LE= 24 degrees  L LE=24 degrees Met   Pt able to walk 440 ft wtih pain post <= 2/10 demonstrating better upright posture with shoulders back and more level.  Pt ambulated 1320'x 1 with no reports of low back pain but had increased hip soreness In progress   Pt reporting any decrease in LBP Pt reports having sciatica pre surgery - that has resolved but still having low back pain Not Met                                                       Long Term Goals Completed by 8 weeks per MD orders Current Status Goal Status   Increase core strength any amount as noted in taller posture with shoulders back and mild lumbar arch with sitting, standing and walking Pt improved with his strength and demonstrates good posture Met   Increase B hamstring 90/90 to <= - 30 deg for less pelvic post tilt and less lumbar pressure resulting in less LBP c/o Increase B hamstring 90/90 to <= 24 degree; pt reports still having low back pain Met   3 laps or 1320 feet in 6 min       Oswestry <= 25% disability form 42% disabiltiy at eval on 9/6/22 Oswestry at 22% on DC Met   Pt competent for advanced HEP for core, posture and strengthening of LE hip ext especially Pt reports doing some of his exercises at home but that he doesnt use his Tband at homel Partially met                                                TREATMENT PLAN       Requires PT Follow-Up: No  Additional Comments: DC to HEP       Therapy Time  Individual Time In:     10:35am     Individual Time Out:  11:35am   Minutes:  60 min         Electronically signed by Konrad Chen PT  on 10/5/2022 at 5:15 PM

## 2022-10-18 PROBLEM — G90.09 IDIOPATHIC PERIPHERAL AUTONOMIC NEUROPATHY: Status: ACTIVE | Noted: 2022-10-18

## 2022-11-01 ENCOUNTER — OFFICE VISIT (OUTPATIENT)
Dept: FAMILY MEDICINE CLINIC | Age: 77
End: 2022-11-01
Payer: MEDICARE

## 2022-11-01 VITALS
HEIGHT: 72 IN | SYSTOLIC BLOOD PRESSURE: 134 MMHG | OXYGEN SATURATION: 97 % | DIASTOLIC BLOOD PRESSURE: 84 MMHG | WEIGHT: 201.6 LBS | BODY MASS INDEX: 27.3 KG/M2 | HEART RATE: 77 BPM | TEMPERATURE: 97.4 F

## 2022-11-01 DIAGNOSIS — N18.31 STAGE 3A CHRONIC KIDNEY DISEASE (HCC): ICD-10-CM

## 2022-11-01 DIAGNOSIS — K21.9 GASTROESOPHAGEAL REFLUX DISEASE, UNSPECIFIED WHETHER ESOPHAGITIS PRESENT: ICD-10-CM

## 2022-11-01 DIAGNOSIS — K29.00 ACUTE GASTRITIS WITHOUT HEMORRHAGE, UNSPECIFIED GASTRITIS TYPE: ICD-10-CM

## 2022-11-01 DIAGNOSIS — Z00.00 PREVENTATIVE HEALTH CARE: ICD-10-CM

## 2022-11-01 DIAGNOSIS — M05.9 RHEUMATOID ARTHRITIS WITH POSITIVE RHEUMATOID FACTOR, INVOLVING UNSPECIFIED SITE (HCC): ICD-10-CM

## 2022-11-01 DIAGNOSIS — Z12.5 PROSTATE CANCER SCREENING: ICD-10-CM

## 2022-11-01 DIAGNOSIS — R11.0 NAUSEA: Primary | ICD-10-CM

## 2022-11-01 DIAGNOSIS — Z23 NEED FOR INFLUENZA VACCINATION: ICD-10-CM

## 2022-11-01 DIAGNOSIS — H66.002 NON-RECURRENT ACUTE SUPPURATIVE OTITIS MEDIA OF LEFT EAR WITHOUT SPONTANEOUS RUPTURE OF TYMPANIC MEMBRANE: ICD-10-CM

## 2022-11-01 LAB
ALBUMIN SERPL-MCNC: 4.1 G/DL (ref 3.5–4.6)
ALP BLD-CCNC: 47 U/L (ref 35–104)
ALT SERPL-CCNC: 16 U/L (ref 0–41)
ANION GAP SERPL CALCULATED.3IONS-SCNC: 12 MEQ/L (ref 9–15)
AST SERPL-CCNC: 18 U/L (ref 0–40)
BASOPHILS ABSOLUTE: 0.1 K/UL (ref 0–0.2)
BASOPHILS RELATIVE PERCENT: 0.8 %
BILIRUB SERPL-MCNC: 0.4 MG/DL (ref 0.2–0.7)
BUN BLDV-MCNC: 27 MG/DL (ref 8–23)
CALCIUM SERPL-MCNC: 9.9 MG/DL (ref 8.5–9.9)
CHLORIDE BLD-SCNC: 104 MEQ/L (ref 95–107)
CHOLESTEROL, TOTAL: 187 MG/DL (ref 0–199)
CO2: 26 MEQ/L (ref 20–31)
CREAT SERPL-MCNC: 1.42 MG/DL (ref 0.7–1.2)
EOSINOPHILS ABSOLUTE: 0.2 K/UL (ref 0–0.7)
EOSINOPHILS RELATIVE PERCENT: 1.3 %
GFR SERPL CREATININE-BSD FRML MDRD: 50.2 ML/MIN/{1.73_M2}
GLOBULIN: 3.1 G/DL (ref 2.3–3.5)
GLUCOSE BLD-MCNC: 105 MG/DL (ref 70–99)
HCT VFR BLD CALC: 45.7 % (ref 42–52)
HDLC SERPL-MCNC: 48 MG/DL (ref 40–59)
HEMOGLOBIN: 15.2 G/DL (ref 14–18)
LDL CHOLESTEROL CALCULATED: 118 MG/DL (ref 0–129)
LYMPHOCYTES ABSOLUTE: 1.9 K/UL (ref 1–4.8)
LYMPHOCYTES RELATIVE PERCENT: 16 %
MCH RBC QN AUTO: 31.6 PG (ref 27–31.3)
MCHC RBC AUTO-ENTMCNC: 33.4 % (ref 33–37)
MCV RBC AUTO: 94.7 FL (ref 79–92.2)
MONOCYTES ABSOLUTE: 0.6 K/UL (ref 0.2–0.8)
MONOCYTES RELATIVE PERCENT: 5.4 %
NEUTROPHILS ABSOLUTE: 8.9 K/UL (ref 1.4–6.5)
NEUTROPHILS RELATIVE PERCENT: 76.5 %
PDW BLD-RTO: 15.2 % (ref 11.5–14.5)
PLATELET # BLD: 250 K/UL (ref 130–400)
POTASSIUM SERPL-SCNC: 5.3 MEQ/L (ref 3.4–4.9)
PROSTATE SPECIFIC ANTIGEN: 25.6 NG/ML (ref 0–4)
RBC # BLD: 4.82 M/UL (ref 4.7–6.1)
SODIUM BLD-SCNC: 142 MEQ/L (ref 135–144)
TOTAL PROTEIN: 7.2 G/DL (ref 6.3–8)
TRIGL SERPL-MCNC: 106 MG/DL (ref 0–150)
WBC # BLD: 11.7 K/UL (ref 4.8–10.8)

## 2022-11-01 PROCEDURE — G8484 FLU IMMUNIZE NO ADMIN: HCPCS | Performed by: NURSE PRACTITIONER

## 2022-11-01 PROCEDURE — 99214 OFFICE O/P EST MOD 30 MIN: CPT | Performed by: NURSE PRACTITIONER

## 2022-11-01 PROCEDURE — 1123F ACP DISCUSS/DSCN MKR DOCD: CPT | Performed by: NURSE PRACTITIONER

## 2022-11-01 PROCEDURE — 1036F TOBACCO NON-USER: CPT | Performed by: NURSE PRACTITIONER

## 2022-11-01 PROCEDURE — G0008 ADMIN INFLUENZA VIRUS VAC: HCPCS | Performed by: NURSE PRACTITIONER

## 2022-11-01 PROCEDURE — 3078F DIAST BP <80 MM HG: CPT | Performed by: NURSE PRACTITIONER

## 2022-11-01 PROCEDURE — G8417 CALC BMI ABV UP PARAM F/U: HCPCS | Performed by: NURSE PRACTITIONER

## 2022-11-01 PROCEDURE — 90694 VACC AIIV4 NO PRSRV 0.5ML IM: CPT | Performed by: NURSE PRACTITIONER

## 2022-11-01 PROCEDURE — 3074F SYST BP LT 130 MM HG: CPT | Performed by: NURSE PRACTITIONER

## 2022-11-01 PROCEDURE — G8427 DOCREV CUR MEDS BY ELIG CLIN: HCPCS | Performed by: NURSE PRACTITIONER

## 2022-11-01 RX ORDER — PANTOPRAZOLE SODIUM 40 MG/1
40 TABLET, DELAYED RELEASE ORAL
Qty: 30 TABLET | Refills: 1 | Status: SHIPPED | OUTPATIENT
Start: 2022-11-01

## 2022-11-01 RX ORDER — AMOXICILLIN AND CLAVULANATE POTASSIUM 875; 125 MG/1; MG/1
1 TABLET, FILM COATED ORAL 2 TIMES DAILY
Qty: 14 TABLET | Refills: 0 | Status: SHIPPED | OUTPATIENT
Start: 2022-11-01 | End: 2022-11-08

## 2022-11-01 NOTE — PROGRESS NOTES
Subjective:      Patient ID: Isaac Pastor is a 76 y.o. male who presents today for:     Chief Complaint   Patient presents with    Nausea     Patient presents today c/o nausea. Patient states he is not able to eat a lot. Patient states he has lost 30 lbs in 2-3 weeks. HPI Patient today to follow-up on weight loss and nausea. He reports that over the last couple of weeks he has lost about 20 pounds. He reports no vomiting or blood in vomit. Pt reports no diarrhea or constipation. Reports that he was taking topamax in mid September and ended at the end of September. Was getting severe heart burn from it. Reports that all of October he has had no appetite and has had burning, sore, aching in epigastric region. He has tried pepto and tums with good relief. Report that as of the last few days his appetite has improved some and was able to eat half sandwich and a muffin yesterday. Patient also does report that he has had upper respiratory infections and nasal congestion over the last couple of weeks. It has not worsened but has not improved either. Denies any fevers or chills. Denies any concern for COVID or flu.     Past Medical History:   Diagnosis Date    Arthritis     R/A    CAD (coronary artery disease)     cardiac stents x 3    Chronic back pain     COPD (chronic obstructive pulmonary disease) ([facility])     past smoker > 25 yr habit / quit 25 yrs ago    COPD (chronic obstructive pulmonary disease) ([facility])     Coronary artery disease     Hemorrhoids     Hyperlipidemia     past trx / cannot tolerate statins    Hypertension     meds > 10 yrs / denies TIA or stroke    Neuropathy     PMR (polymyalgia rheumatica) ([facility])     Restless leg syndrome      Past Surgical History:   Procedure Laterality Date    CARDIAC SURGERY      2-3 cardiac stents    CARPAL TUNNEL RELEASE      EYE SURGERY  right    retina hole / Phaco with IOL     TX REVISE MEDIAN N/CARPAL TUNNEL SURG Right 1/11/2018    RIGHT WRIST CARPAL TUNNEL RELEASE performed by Vasiliy Monsalve MD at [address]      2-3 cardiac stents     Family History   Problem Relation Age of Onset    Heart Attack Mother     Cancer Father         LYMPHOMA    Heart Disease Sister         pacemaker     Social History     Socioeconomic History    Marital status:      Spouse name: Not on file    Number of children: Not on file    Years of education: Not on file    Highest education level: Not on file   Occupational History    Not on file   Tobacco Use    Smoking status: Former     Packs/day: 1.00     Years: 25.00     Pack years: 25.00     Types: Cigarettes     Quit date: 1992     Years since quittin.7    Smokeless tobacco: Never   Substance and Sexual Activity    Alcohol use: Not Currently     Comment: occasional     Drug use: No    Sexual activity: Not on file   Other Topics Concern    Not on file   Social History Narrative    Not on file     Social Determinants of Health     Financial Resource Strain: Low Risk     Difficulty of Paying Living Expenses: Not hard at all   Food Insecurity: No Food Insecurity    Worried About Running Out of Food in the Last Year: Never true    Ran Out of Food in the Last Year: Never true   Transportation Needs: Not on file   Physical Activity: Not on file   Stress: Not on file   Social Connections: Not on file   Intimate Partner Violence: Not on file   Housing Stability: Not on file     Current Outpatient Medications on File Prior to Visit   Medication Sig Dispense Refill    naproxen (NAPROSYN) 500 MG tablet Take by mouth      tamsulosin (FLOMAX) 0.4 MG capsule Take by mouth      hydroCHLOROthiazide (HYDRODIURIL) 12.5 MG tablet TAKE 1 TABLET BY MOUTH EVERY MORNING      montelukast (SINGULAIR) 10 MG tablet Take 10 mg by mouth nightly      metoprolol succinate (TOPROL XL) 25 MG extended release tablet take 1 tablet daily  3    folic acid (FOLVITE) 1 MG tablet Take 1 mg by mouth daily      Nebulizer MISC by Does not apply route ipratropium-albuterol (DUONEB) 0.5-2.5 (3) MG/3ML SOLN nebulizer solution Inhale 1 vial into the lungs every 4 hours      Misc Natural Products (PROSTATE SUPPORT PO) Take by mouth 2 times daily      vitamin D-3 (CHOLECALCIFEROL) 5000 UNITS TABS Take 5,000 Units by mouth daily. aspirin 81 MG tablet Take 81 mg by mouth daily. magnesium oxide (MAG-OX) 400 MG tablet Take 400 mg by mouth daily. azelastine (ASTELIN) 0.1 % nasal spray 1 spray by Nasal route 2 times daily Use in each nostril as directed (Patient not taking: Reported on 11/1/2022) 60 mL 0    docusate sodium (COLACE) 100 MG capsule TAKE 1 CAPSULE BY MOUTH TWICE DAILY (Patient not taking: Reported on 11/1/2022)      gabapentin (NEURONTIN) 300 MG capsule TAKE 2 CAPSULES BY MOUTH TWICE DAILY (Patient not taking: Reported on 11/1/2022)  1    methotrexate (RHEUMATREX) 2.5 MG chemo tablet Take 4 tablets by mouth once a week. (Patient not taking: Reported on 11/1/2022) 16 tablet 0     Current Facility-Administered Medications on File Prior to Visit   Medication Dose Route Frequency Provider Last Rate Last Admin    albuterol (PROVENTIL) nebulizer solution 2.5 mg  2.5 mg Nebulization Once Jacinta Morris DO           Allergies:  Seasonal and Statins    Review of Systems   Constitutional:  Positive for appetite change. Negative for chills, fatigue and fever. HENT:  Negative for congestion. Respiratory:  Negative for cough and shortness of breath. Cardiovascular:  Negative for chest pain and palpitations. Gastrointestinal:  Positive for abdominal pain and nausea. Negative for constipation, diarrhea and vomiting. Neurological:  Negative for dizziness.      Objective:   /84 (Site: Right Upper Arm, Position: Sitting, Cuff Size: Medium Adult)   Pulse 77   Temp 97.4 °F (36.3 °C) (Temporal)   Ht 6' (1.829 m)   Wt 201 lb 9.6 oz (91.4 kg)   SpO2 97%   BMI 27.34 kg/m²     Physical Exam  Constitutional:       Appearance: He is well-developed. HENT:      Head: Normocephalic. Right Ear: External ear normal. Tympanic membrane is erythematous and bulging. Left Ear: Tympanic membrane, ear canal and external ear normal.      Nose: Nose normal.      Mouth/Throat:      Mouth: Mucous membranes are moist.      Pharynx: Oropharynx is clear. Uvula midline. Eyes:      General:         Right eye: No discharge. Left eye: No discharge. Conjunctiva/sclera: Conjunctivae normal.   Cardiovascular:      Rate and Rhythm: Normal rate and regular rhythm. Heart sounds: Normal heart sounds. Pulmonary:      Effort: Pulmonary effort is normal. No respiratory distress. Breath sounds: Normal breath sounds. Abdominal:      General: Bowel sounds are normal.      Palpations: Abdomen is soft. Tenderness: There is no abdominal tenderness. Musculoskeletal:      Cervical back: Normal range of motion. Lymphadenopathy:      Cervical: No cervical adenopathy. Skin:     General: Skin is warm and dry. Neurological:      Mental Status: He is alert and oriented to person, place, and time. Psychiatric:         Mood and Affect: Mood normal.         Behavior: Behavior normal.       Assessment:          Diagnosis Orders   1. Nausea        2. Need for influenza vaccination  Influenza, FLUAD, (age 72 y+), IM, Preservative Free, 0.5 mL      3. Preventative health care  CBC with Auto Differential    Comprehensive Metabolic Panel    Lipid Panel      4. Rheumatoid arthritis with positive rheumatoid factor, involving unspecified site (HCC)  CBC with Auto Differential    Comprehensive Metabolic Panel      5. Stage 3a chronic kidney disease (HCC)  CBC with Auto Differential    Comprehensive Metabolic Panel      6. Gastroesophageal reflux disease, unspecified whether esophagitis present        7.  Acute gastritis without hemorrhage, unspecified gastritis type  CBC with Auto Differential    Comprehensive Metabolic Panel    pantoprazole (PROTONIX) 40 MG tablet      8. Prostate cancer screening  PSA Screening      9. Non-recurrent acute suppurative otitis media of left ear without spontaneous rupture of tympanic membrane  amoxicillin-clavulanate (AUGMENTIN) 875-125 MG per tablet          Plan:      Orders Placed This Encounter   Procedures    Influenza, FLUAD, (age 72 y+), IM, Preservative Free, 0.5 mL    CBC with Auto Differential     Standing Status:   Future     Number of Occurrences:   1     Standing Expiration Date:   11/1/2023    Comprehensive Metabolic Panel     Standing Status:   Future     Number of Occurrences:   1     Standing Expiration Date:   11/1/2023    Lipid Panel     Standing Status:   Future     Number of Occurrences:   1     Standing Expiration Date:   11/1/2023     Order Specific Question:   Is Patient Fasting?/# of Hours     Answer:   8    PSA Screening     Standing Status:   Future     Number of Occurrences:   1     Standing Expiration Date:   11/1/2023            Orders Placed This Encounter   Medications    pantoprazole (PROTONIX) 40 MG tablet     Sig: Take 1 tablet by mouth every morning (before breakfast)     Dispense:  30 tablet     Refill:  1    amoxicillin-clavulanate (AUGMENTIN) 875-125 MG per tablet     Sig: Take 1 tablet by mouth 2 times daily for 7 days     Dispense:  14 tablet     Refill:  0         Return in about 6 weeks (around 12/13/2022). 1. Need for influenza vaccination    - Influenza, FLUAD, (age 72 y+), IM, Preservative Free, 0.5 mL    2. Preventative health care    - CBC with Auto Differential; Future  - Comprehensive Metabolic Panel; Future  - Lipid Panel; Future    3. Nausea  Resolved. 4. Rheumatoid arthritis with positive rheumatoid factor, involving unspecified site ([facility]    - CBC with Auto Differential; Future  - Comprehensive Metabolic Panel; Future    5. Stage 3a chronic kidney disease (HCC)    - CBC with Auto Differential; Future  - Comprehensive Metabolic Panel; Future    6. Gastroesophageal reflux disease, unspecified whether esophagitis present      7. Acute gastritis without hemorrhage, unspecified gastritis type  Start Protonix to help heal stomach for now. Discussed that should not be a long-term medication at this point but likely just inflamed from recent medication changes and diet. - CBC with Auto Differential; Future  - Comprehensive Metabolic Panel; Future  - pantoprazole (PROTONIX) 40 MG tablet; Take 1 tablet by mouth every morning (before breakfast)  Dispense: 30 tablet; Refill: 1    8. Prostate cancer screening    - PSA Screening; Future    9. Non-recurrent acute suppurative otitis media of left ear without spontaneous rupture of tympanic membrane  Encouraged increase in fluids and rest. Ibuprofen and tylenol as needed. Discussed otc comfort care. Encouraged eating yogurt or taking probiotic daily while on atb to help promote gut health as the use of any atb can cause intestinal infections such as c.diff.    - amoxicillin-clavulanate (AUGMENTIN) 875-125 MG per tablet; Take 1 tablet by mouth 2 times daily for 7 days  Dispense: 14 tablet; Refill: 0    Reviewed with the patient: current clinicalstatus, medications, activities and diet. Side effects, adverse effects of the medication prescribedtoday, as well as treatment plan/ rationale and result expectations have been discussedwith the patient who expresses understanding and desires to proceed. Close follow upto evaluate treatment results and for coordination of care. I have reviewedthe patient's medical history in detail and updated the computerized patient record.     Michelle Hale, APRN - CNP

## 2022-11-01 NOTE — PROGRESS NOTES
Vaccine Information Sheet, \"Influenza - Inactivated\"  given to Raquel Printers, or parent/legal guardian of  Raquel Printers and verbalized understanding. Patient responses:    Have you ever had a reaction to a flu vaccine? No  Are you able to eat eggs without adverse effects? Yes  Do you have any current illness? No  Have you ever had [place] Syndrome? No    Flu vaccine given per order. Please see immunization tab.

## 2022-11-03 ASSESSMENT — ENCOUNTER SYMPTOMS
ABDOMINAL PAIN: 1
COUGH: 0
CONSTIPATION: 0
SHORTNESS OF BREATH: 0
DIARRHEA: 0
NAUSEA: 1
VOMITING: 0

## 2023-01-11 ENCOUNTER — OFFICE VISIT (OUTPATIENT)
Dept: FAMILY MEDICINE CLINIC | Age: 78
End: 2023-01-11

## 2023-01-11 VITALS
RESPIRATION RATE: 15 BRPM | HEIGHT: 72 IN | DIASTOLIC BLOOD PRESSURE: 58 MMHG | BODY MASS INDEX: 28.44 KG/M2 | OXYGEN SATURATION: 94 % | SYSTOLIC BLOOD PRESSURE: 102 MMHG | WEIGHT: 210 LBS | HEART RATE: 51 BPM | TEMPERATURE: 97.9 F

## 2023-01-11 DIAGNOSIS — N18.31 STAGE 3A CHRONIC KIDNEY DISEASE (HCC): ICD-10-CM

## 2023-01-11 DIAGNOSIS — M05.9 RHEUMATOID ARTHRITIS WITH POSITIVE RHEUMATOID FACTOR, INVOLVING UNSPECIFIED SITE (HCC): ICD-10-CM

## 2023-01-11 DIAGNOSIS — R05.2 SUBACUTE COUGH: Primary | ICD-10-CM

## 2023-01-11 DIAGNOSIS — J44.9 CHRONIC OBSTRUCTIVE PULMONARY DISEASE, UNSPECIFIED COPD TYPE (HCC): ICD-10-CM

## 2023-01-11 RX ORDER — LISINOPRIL 10 MG/1
10 TABLET ORAL DAILY
COMMUNITY

## 2023-01-11 ASSESSMENT — PATIENT HEALTH QUESTIONNAIRE - PHQ9
1. LITTLE INTEREST OR PLEASURE IN DOING THINGS: 0
SUM OF ALL RESPONSES TO PHQ QUESTIONS 1-9: 0
SUM OF ALL RESPONSES TO PHQ QUESTIONS 1-9: 0
SUM OF ALL RESPONSES TO PHQ9 QUESTIONS 1 & 2: 0
2. FEELING DOWN, DEPRESSED OR HOPELESS: 0
SUM OF ALL RESPONSES TO PHQ QUESTIONS 1-9: 0
SUM OF ALL RESPONSES TO PHQ QUESTIONS 1-9: 0

## 2023-01-11 ASSESSMENT — ENCOUNTER SYMPTOMS
ALLERGIC/IMMUNOLOGIC NEGATIVE: 1
SHORTNESS OF BREATH: 0
COUGH: 1
GASTROINTESTINAL NEGATIVE: 1
EYES NEGATIVE: 1

## 2023-01-11 NOTE — PROGRESS NOTES
Patient is seen in follow up for   Chief Complaint   Patient presents with    Dizziness     Has seen Martínez Blanchard and saw cardio yesterday stopped the water pill and lowered his lisinopril     Dizziness  Associated symptoms include coughing. Pertinent negatives include no chest pain, chills or fever. here for follow up after covid, still with cough and chest congestion.     Past Medical History:   Diagnosis Date    Arthritis     R/A    CAD (coronary artery disease)     cardiac stents x 3    Chronic back pain     COPD (chronic obstructive pulmonary disease) ([facility])     past smoker > 25 yr habit / quit 25 yrs ago    COPD (chronic obstructive pulmonary disease) ([facility]     Coronary artery disease     Hemorrhoids     Hyperlipidemia     past trx / cannot tolerate statins    Hypertension     meds > 10 yrs / denies TIA or stroke    Neuropathy     PMR (polymyalgia rheumatica) ([facility])     Restless leg syndrome      Patient Active Problem List    Diagnosis Date Noted    Idiopathic peripheral autonomic neuropathy 10/18/2022    Chronic obstructive pulmonary disease ([facility] 02/26/2021    Spinal stenosis of lumbar region with neurogenic claudication 12/08/2020    Adhesive capsulitis of right shoulder 12/08/2020    Hip pain 06/20/2018    RLS (restless legs syndrome) 01/03/2018    Carpal tunnel syndrome of right wrist 01/03/2018    Former smoker, stopped smoking in distant past 01/03/2018    Elevated PSA 06/19/2014    CKD (chronic kidney disease) stage 3, GFR 30-59 ml/min ([facility]) 05/29/2014    Chronic back pain 05/29/2014    Statin intolerance 12/05/2013    Trigger index finger of left hand 06/27/2013    Lumbar spondylosis 10/25/2012    Arthritis of shoulder region 10/25/2012    CTS (carpal tunnel syndrome) 09/20/2012    Vitamin D deficiency 07/16/2012    BPH (benign prostatic hyperplasia) 07/10/2012    Neuropathy 07/10/2012    Eczema 01/10/2012    Rheumatoid arthritis ([facility] 01/10/2012    CAD (coronary artery disease)     Hypertension Past Surgical History:   Procedure Laterality Date    CARDIAC SURGERY      2-3 cardiac stents    CARPAL TUNNEL RELEASE      EYE SURGERY  right    retina hole / Phaco with IOL     DE NEUROPLASTY &/TRANSPOS MEDIAN NRV CARPAL TUNNE Right 2018    RIGHT WRIST CARPAL TUNNEL RELEASE performed by Shree Trevino MD at [address]      2-3 cardiac stents     Family History   Problem Relation Age of Onset    Heart Attack Mother     Cancer Father         LYMPHOMA    Heart Disease Sister         pacemaker     Social History     Socioeconomic History    Marital status:      Spouse name: None    Number of children: None    Years of education: None    Highest education level: None   Tobacco Use    Smoking status: Former     Packs/day: 1.00     Years: 25.00     Pack years: 25.00     Types: Cigarettes     Quit date: 1992     Years since quittin.9    Smokeless tobacco: Never   Substance and Sexual Activity    Alcohol use: Not Currently     Comment: occasional     Drug use: No     Social Determinants of Health     Financial Resource Strain: Low Risk     Difficulty of Paying Living Expenses: Not hard at all   Food Insecurity: No Food Insecurity    Worried About [facility] [employer] in the Last Year: Never true    Ran Out of Food in the Last Year: Never true     Current Outpatient Medications   Medication Sig Dispense Refill    lisinopril (PRINIVIL;ZESTRIL) 10 MG tablet Take 10 mg by mouth daily      pantoprazole (PROTONIX) 40 MG tablet Take 1 tablet by mouth every morning (before breakfast) 30 tablet 1    azelastine (ASTELIN) 0.1 % nasal spray 1 spray by Nasal route 2 times daily Use in each nostril as directed (Patient not taking: Reported on 2022) 60 mL 0    naproxen (NAPROSYN) 500 MG tablet Take by mouth      docusate sodium (COLACE) 100 MG capsule TAKE 1 CAPSULE BY MOUTH TWICE DAILY (Patient not taking: Reported on 2022)      tamsulosin (FLOMAX) 0.4 MG capsule Take by mouth montelukast (SINGULAIR) 10 MG tablet Take 10 mg by mouth nightly      gabapentin (NEURONTIN) 300 MG capsule TAKE 2 CAPSULES BY MOUTH TWICE DAILY (Patient not taking: Reported on 11/1/2022)  1    metoprolol succinate (TOPROL XL) 25 MG extended release tablet take 1 tablet daily  3    folic acid (FOLVITE) 1 MG tablet Take 1 mg by mouth daily      Nebulizer MISC by Does not apply route      ipratropium-albuterol (DUONEB) 0.5-2.5 (3) MG/3ML SOLN nebulizer solution Inhale 1 vial into the lungs every 4 hours      Misc Natural Products (PROSTATE SUPPORT PO) Take by mouth 2 times daily      vitamin D-3 (CHOLECALCIFEROL) 5000 UNITS TABS Take 5,000 Units by mouth daily. methotrexate (RHEUMATREX) 2.5 MG chemo tablet Take 4 tablets by mouth once a week. (Patient not taking: Reported on 11/1/2022) 16 tablet 0    aspirin 81 MG tablet Take 81 mg by mouth daily. magnesium oxide (MAG-OX) 400 MG tablet Take 400 mg by mouth daily.          Current Facility-Administered Medications   Medication Dose Route Frequency Provider Last Rate Last Admin    albuterol (PROVENTIL) nebulizer solution 2.5 mg  2.5 mg Nebulization Once [facility], DO         Current Outpatient Medications on File Prior to Visit   Medication Sig Dispense Refill    lisinopril (PRINIVIL;ZESTRIL) 10 MG tablet Take 10 mg by mouth daily      pantoprazole (PROTONIX) 40 MG tablet Take 1 tablet by mouth every morning (before breakfast) 30 tablet 1    azelastine (ASTELIN) 0.1 % nasal spray 1 spray by Nasal route 2 times daily Use in each nostril as directed (Patient not taking: Reported on 11/1/2022) 60 mL 0    naproxen (NAPROSYN) 500 MG tablet Take by mouth      docusate sodium (COLACE) 100 MG capsule TAKE 1 CAPSULE BY MOUTH TWICE DAILY (Patient not taking: Reported on 11/1/2022)      tamsulosin (FLOMAX) 0.4 MG capsule Take by mouth      montelukast (SINGULAIR) 10 MG tablet Take 10 mg by mouth nightly      gabapentin (NEURONTIN) 300 MG capsule TAKE 2 CAPSULES BY MOUTH TWICE DAILY (Patient not taking: Reported on 11/1/2022)  1    metoprolol succinate (TOPROL XL) 25 MG extended release tablet take 1 tablet daily  3    folic acid (FOLVITE) 1 MG tablet Take 1 mg by mouth daily      Nebulizer MISC by Does not apply route      ipratropium-albuterol (DUONEB) 0.5-2.5 (3) MG/3ML SOLN nebulizer solution Inhale 1 vial into the lungs every 4 hours      Misc Natural Products (PROSTATE SUPPORT PO) Take by mouth 2 times daily      vitamin D-3 (CHOLECALCIFEROL) 5000 UNITS TABS Take 5,000 Units by mouth daily. methotrexate (RHEUMATREX) 2.5 MG chemo tablet Take 4 tablets by mouth once a week. (Patient not taking: Reported on 11/1/2022) 16 tablet 0    aspirin 81 MG tablet Take 81 mg by mouth daily. magnesium oxide (MAG-OX) 400 MG tablet Take 400 mg by mouth daily. Current Facility-Administered Medications on File Prior to Visit   Medication Dose Route Frequency Provider Last Rate Last Admin    albuterol (PROVENTIL) nebulizer solution 2.5 mg  2.5 mg Nebulization Once [facility], DO         Allergies   Allergen Reactions    Seasonal      Sinus congestion    Statins      MYALGIAS     Health Maintenance   Topic Date Due    Hepatitis C screen  Never done    DTaP/Tdap/Td vaccine (1 - Tdap) 05/18/2000    COVID-19 Vaccine (3 - Booster for Pfizer series) 05/08/2021    Annual Wellness Visit (AWV)  02/27/2022    Shingles vaccine (1 of 2) 10/01/2025 (Originally 6/27/1993)    Depression Screen  04/01/2023    Prostate Specific Antigen (PSA) Screening or Monitoring  11/01/2023    Flu vaccine  Completed    Pneumococcal 65+ years Vaccine  Completed    Hepatitis A vaccine  Aged Out    Hib vaccine  Aged Out    Meningococcal (ACWY) vaccine  Aged Out       Review of Systems     Review of Systems   Constitutional:  Negative for activity change, appetite change, chills, fever and unexpected weight change. HENT: Negative. Eyes: Negative. Respiratory:  Positive for cough. Negative for shortness of breath. Cardiovascular: Negative. Negative for chest pain and palpitations. Gastrointestinal: Negative. Endocrine: Negative. Genitourinary: Negative. Musculoskeletal: Negative. Skin: Negative. Allergic/Immunologic: Negative. Neurological:  Positive for dizziness. Hematological: Negative. Psychiatric/Behavioral: Negative. Physical Exam  Vitals:    01/11/23 0936   BP: (!) 102/58   Pulse: 51   Resp: 15   Temp: 97.9 °F (36.6 °C)   SpO2: 94%   Weight: 210 lb (95.3 kg)   Height: 6' (1.829 m)       Physical Exam  Constitutional:       Appearance: He is well-developed. HENT:      Right Ear: External ear normal.      Left Ear: External ear normal.   Eyes:      Conjunctiva/sclera: Conjunctivae normal.      Pupils: Pupils are equal, round, and reactive to light. Neck:      Thyroid: No thyromegaly. Cardiovascular:      Rate and Rhythm: Normal rate and regular rhythm. Heart sounds: Normal heart sounds. No murmur heard. No friction rub. No gallop. Pulmonary:      Effort: Pulmonary effort is normal. No respiratory distress. Breath sounds: Normal breath sounds. No wheezing. Abdominal:      General: Bowel sounds are normal. There is no distension. Palpations: Abdomen is soft. There is no mass. Tenderness: There is no abdominal tenderness. There is no guarding or rebound. Hernia: No hernia is present. Genitourinary:     Penis: Normal.    Musculoskeletal:         General: No tenderness. Normal range of motion. Cervical back: Normal range of motion and neck supple. Lymphadenopathy:      Cervical: No cervical adenopathy. Skin:     General: Skin is warm and dry. Neurological:      Mental Status: He is alert and oriented to person, place, and time. Cranial Nerves: No cranial nerve deficit. Coordination: Coordination normal.       Assessment   Diagnosis Orders   1. Subacute cough        2.  Rheumatoid arthritis with positive rheumatoid factor, involving unspecified site ([facility]        3. Chronic obstructive pulmonary disease, unspecified COPD type ([facility])        4. Stage 3a chronic kidney disease ([facility])          Problem List       Rheumatoid arthritis ([facility]    Relevant Medications    aspirin 81 MG tablet    methotrexate (RHEUMATREX) 2.5 MG chemo tablet    naproxen (NAPROSYN) 500 MG tablet    CKD (chronic kidney disease) stage 3, GFR 30-59 ml/min ([facility])    Chronic obstructive pulmonary disease ([facility])    Relevant Medications    albuterol (PROVENTIL) nebulizer solution 2.5 mg    ipratropium-albuterol (DUONEB) 0.5-2.5 (3) MG/3ML SOLN nebulizer solution    montelukast (SINGULAIR) 10 MG tablet    azelastine (ASTELIN) 0.1 % nasal spray       Plan  Doing well cough will resolve in a couple weeks. No orders of the defined types were placed in this encounter. No follow-ups on file.   Noel Carrera MD

## 2023-01-18 ENCOUNTER — TELEMEDICINE (OUTPATIENT)
Dept: FAMILY MEDICINE CLINIC | Age: 78
End: 2023-01-18
Payer: MEDICARE

## 2023-01-18 DIAGNOSIS — Z00.00 MEDICARE ANNUAL WELLNESS VISIT, SUBSEQUENT: Primary | ICD-10-CM

## 2023-01-18 PROCEDURE — 1123F ACP DISCUSS/DSCN MKR DOCD: CPT | Performed by: NURSE PRACTITIONER

## 2023-01-18 PROCEDURE — G0439 PPPS, SUBSEQ VISIT: HCPCS | Performed by: NURSE PRACTITIONER

## 2023-01-18 PROCEDURE — G8484 FLU IMMUNIZE NO ADMIN: HCPCS | Performed by: NURSE PRACTITIONER

## 2023-01-18 RX ORDER — FINASTERIDE 5 MG/1
TABLET, FILM COATED ORAL
COMMUNITY
Start: 2022-12-09

## 2023-01-18 RX ORDER — ALBUTEROL SULFATE 2.5 MG/3ML
SOLUTION RESPIRATORY (INHALATION)
COMMUNITY

## 2023-01-18 RX ORDER — LANSOPRAZOLE 15 MG/1
15 CAPSULE, DELAYED RELEASE ORAL DAILY
COMMUNITY
Start: 2022-11-07 | End: 2023-02-05

## 2023-01-18 ASSESSMENT — PATIENT HEALTH QUESTIONNAIRE - PHQ9
SUM OF ALL RESPONSES TO PHQ QUESTIONS 1-9: 0
SUM OF ALL RESPONSES TO PHQ9 QUESTIONS 1 & 2: 0
2. FEELING DOWN, DEPRESSED OR HOPELESS: 0
SUM OF ALL RESPONSES TO PHQ QUESTIONS 1-9: 0
1. LITTLE INTEREST OR PLEASURE IN DOING THINGS: 0

## 2023-01-18 ASSESSMENT — LIFESTYLE VARIABLES
HOW MANY STANDARD DRINKS CONTAINING ALCOHOL DO YOU HAVE ON A TYPICAL DAY: PATIENT DOES NOT DRINK
HOW OFTEN DO YOU HAVE A DRINK CONTAINING ALCOHOL: NEVER

## 2023-01-18 NOTE — PATIENT INSTRUCTIONS
Advance Directives: Care Instructions  Overview  An advance directive is a legal way to state your wishes at the end of your life. It tells your family and your doctor what to do if you can't say what you want. There are two main types of advance directives. You can change them any time your wishes change. Living will. This form tells your family and your doctor your wishes about life support and other treatment. The form is also called a declaration. Medical power of . This form lets you name a person to make treatment decisions for you when you can't speak for yourself. This person is called a health care agent (health care proxy, health care surrogate). The form is also called a durable power of  for health care. If you do not have an advance directive, decisions about your medical care may be made by a family member, or by a doctor or a  who doesn't know you. It may help to think of an advance directive as a gift to the people who care for you. If you have one, they won't have to make tough decisions by themselves. For more information, including forms for your state, see the [address] website (www.caringinfo.org/planning/advance-directives/). Follow-up care is a key part of your treatment and safety. Be sure to make and go to all appointments, and call your doctor if you are having problems. It's also a good idea to know your test results and keep a list of the medicines you take. What should you include in an advance directive? Many states have a unique advance directive form. (It may ask you to address specific issues.) Or you might use a universal form that's approved by many states. If your form doesn't tell you what to address, it may be hard to know what to include in your advance directive. Use the questions below to help you get started. Who do you want to make decisions about your medical care if you are not able to?   What life-support measures do you want if you have a serious illness that gets worse over time or can't be cured? What are you most afraid of that might happen? (Maybe you're afraid of having pain, losing your independence, or being kept alive by machines.)  Where would you prefer to die? (Your home? A hospital? A nursing home?)  Do you want to donate your organs when you die? Do you want certain Yazidi practices performed before you die? When should you call for help? Be sure to contact your doctor if you have any questions. Where can you learn more? Go to http://www.kwok.com/ and enter R264 to learn more about \"Advance Directives: Care Instructions. \"  Current as of: June 16, 2022               Content Version: 13.5  © 5217-9682 Healthwise, Incorporated. Care instructions adapted under license by [facility] ([facility]). If you have questions about a medical condition or this instruction, always ask your healthcare professional. [place] [address] any warranty or liability for your use of this information. Personalized Preventive Plan for Eual Rubinstein - 1/18/2023  Medicare offers a range of preventive health benefits. Some of the tests and screenings are paid in full while other may be subject to a deductible, co-insurance, and/or copay. Some of these benefits include a comprehensive review of your medical history including lifestyle, illnesses that may run in your family, and various assessments and screenings as appropriate. After reviewing your medical record and screening and assessments performed today your provider may have ordered immunizations, labs, imaging, and/or referrals for you. A list of these orders (if applicable) as well as your Preventive Care list are included within your After Visit Summary for your review.     Other Preventive Recommendations:    A preventive eye exam performed by an eye specialist is recommended every 1-2 years to screen for glaucoma; cataracts, macular degeneration, and other eye disorders. A preventive dental visit is recommended every 6 months. Try to get at least 150 minutes of exercise per week or 10,000 steps per day on a pedometer . Order or download the FREE \"Exercise & Physical Activity: Your Everyday Guide\" from [employer]. Call 6-940.544.2296 or search [employer] online. You need 7071-1473 mg of calcium and 7732-5473 IU of vitamin D per day. It is possible to meet your calcium requirement with diet alone, but a vitamin D supplement is usually necessary to meet this goal.  When exposed to the sun, use a sunscreen that protects against both UVA and UVB radiation with an SPF of 30 or greater. Reapply every 2 to 3 hours or after sweating, drying off with a towel, or swimming. Always wear a seat belt when traveling in a car. Always wear a helmet when riding a bicycle or motorcycle.

## 2023-01-18 NOTE — PROGRESS NOTES
Medicare Annual Wellness Visit    Jackie Stringer is here for Nataly BOUDREAUX    Assessment & Plan   Medicare annual wellness visit, subsequent    Recommendations for Preventive Services Due: see orders and patient instructions/AVS.  Recommended screening schedule for the next 5-10 years is provided to the patient in written form: see Patient Instructions/AVS.     Return for Medicare Annual Wellness Visit in 1 year. Subjective       Patient's complete Health Risk Assessment and screening values have been reviewed and are found in Flowsheets. The following problems were reviewed today and where indicated follow up appointments were made and/or referrals ordered. Positive Risk Factor Screenings with Interventions:                 Weight and Activity:  Physical Activity: Inactive    Days of Exercise per Week: 0 days    Minutes of Exercise per Session: 0 min     On average, how many days per week do you engage in moderate to strenuous exercise (like a brisk walk)?: 0 days  Have you lost any weight without trying in the past 3 months?: No       Inactivity Interventions:  See AVS for additional education material      Dentist Screen:  Have you seen the dentist within the past year?: (!) No    Intervention:  Advised to schedule with their dentist        Advanced Directives:  Do you have a Living Will?: (!) No    Intervention:  has NO advanced directive - not interested in additional information                       Objective      Patient-Reported Vitals  No data recorded          Allergies   Allergen Reactions    Seasonal      Sinus congestion    Statins      MYALGIAS     Prior to Visit Medications    Medication Sig Taking? Authorizing Provider   albuterol (PROVENTIL) (2.5 MG/3ML) 0.083% nebulizer solution Inhale into the lungs Yes Historical Provider, MD   finasteride (PROSCAR) 5 MG tablet TAKE 1 TABLET DAILY.  Yes Historical Provider, MD   lansoprazole (PREVACID) 15 MG delayed release capsule Take 15 mg by mouth daily Yes Historical Provider, MD   lisinopril (PRINIVIL;ZESTRIL) 10 MG tablet Take 10 mg by mouth daily Yes Historical Provider, MD   pantoprazole (PROTONIX) 40 MG tablet Take 1 tablet by mouth every morning (before breakfast) Yes JENNIFER Marlow CNP   naproxen (NAPROSYN) 500 MG tablet Take by mouth Yes Historical Provider, MD   tamsulosin (FLOMAX) 0.4 MG capsule Take by mouth Yes Historical Provider, MD   montelukast (SINGULAIR) 10 MG tablet Take 10 mg by mouth nightly Yes Historical Provider, MD   metoprolol succinate (TOPROL XL) 25 MG extended release tablet take 1 tablet daily Yes Historical Provider, MD   folic acid (FOLVITE) 1 MG tablet Take 1 mg by mouth daily Yes Historical Provider, MD   Nebulizer MISC by Does not apply route Yes Historical Provider, MD   ipratropium-albuterol (DUONEB) 0.5-2.5 (3) MG/3ML SOLN nebulizer solution Inhale 1 vial into the lungs every 4 hours Yes Historical Provider, MD   Misc Natural Products (PROSTATE SUPPORT PO) Take by mouth 2 times daily Yes Historical Provider, MD   vitamin D-3 (CHOLECALCIFEROL) 5000 UNITS TABS Take 5,000 Units by mouth daily. Yes Historical Provider, MD   methotrexate (RHEUMATREX) 2.5 MG chemo tablet Take 10 mg by mouth once a week Yes Beth Castillo MD   aspirin 81 MG tablet Take 81 mg by mouth daily. Yes Historical Provider, MD   magnesium oxide (MAG-OX) 400 MG tablet Take 400 mg by mouth daily. Yes Historical Provider, MD [place] (Including outside providers/suppliers regularly involved in providing care):   Patient Care Team:  Oscar Mahan MD as PCP - General (Family Medicine)  Oscar Mahan MD as PCP - [facility] Provider     Reviewed and updated this visit:  Allergies  Meds              [facility], was evaluated through a synchronous (real-time) audio-video encounter. The patient (or guardian if applicable) is aware that this is a billable service, which includes applicable co-pays.  This Virtual Visit was conducted with patient's (and/or legal guardian's) consent. The visit was conducted pursuant to the emergency declaration under the [facility] [address] authority and the [place] Act. Patient identification was verified, and a caregiver was present when appropriate. The patient was located at Home: [address] [place] [address]. Provider was located at [facility] (Appt Dept): [facility] [address]

## 2023-02-20 ENCOUNTER — ANESTHESIA EVENT (OUTPATIENT)
Dept: OPERATING ROOM | Age: 78
End: 2023-02-20
Payer: MEDICARE

## 2023-02-20 ENCOUNTER — HOSPITAL ENCOUNTER (OUTPATIENT)
Age: 78
Setting detail: OUTPATIENT SURGERY
Discharge: HOME | End: 2023-02-20
Attending: ORTHOPAEDIC SURGERY | Admitting: ORTHOPAEDIC SURGERY
Payer: MEDICARE

## 2023-02-20 ENCOUNTER — ANESTHESIA (OUTPATIENT)
Dept: OPERATING ROOM | Age: 78
End: 2023-02-20
Payer: MEDICARE

## 2023-02-20 VITALS
HEIGHT: 74 IN | SYSTOLIC BLOOD PRESSURE: 172 MMHG | WEIGHT: 205 LBS | BODY MASS INDEX: 26.31 KG/M2 | DIASTOLIC BLOOD PRESSURE: 84 MMHG | RESPIRATION RATE: 14 BRPM | OXYGEN SATURATION: 95 % | TEMPERATURE: 97 F | HEART RATE: 61 BPM

## 2023-02-20 DIAGNOSIS — M71.122 SEPTIC OLECRANON BURSITIS OF LEFT ELBOW: ICD-10-CM

## 2023-02-20 DIAGNOSIS — Z98.890 S/P EXCISIONAL DEBRIDEMENT: Primary | ICD-10-CM

## 2023-02-20 PROCEDURE — A4217 STERILE WATER/SALINE, 500 ML: HCPCS | Performed by: ORTHOPAEDIC SURGERY

## 2023-02-20 PROCEDURE — 7100000010 HC PHASE II RECOVERY - FIRST 15 MIN: Performed by: ORTHOPAEDIC SURGERY

## 2023-02-20 PROCEDURE — 3600000013 HC SURGERY LEVEL 3 ADDTL 15MIN: Performed by: ORTHOPAEDIC SURGERY

## 2023-02-20 PROCEDURE — 87070 CULTURE OTHR SPECIMN AEROBIC: CPT

## 2023-02-20 PROCEDURE — 2580000003 HC RX 258: Performed by: ANESTHESIOLOGY

## 2023-02-20 PROCEDURE — 3700000000 HC ANESTHESIA ATTENDED CARE: Performed by: ORTHOPAEDIC SURGERY

## 2023-02-20 PROCEDURE — 2580000003 HC RX 258: Performed by: ORTHOPAEDIC SURGERY

## 2023-02-20 PROCEDURE — 3600000003 HC SURGERY LEVEL 3 BASE: Performed by: ORTHOPAEDIC SURGERY

## 2023-02-20 PROCEDURE — 87075 CULTR BACTERIA EXCEPT BLOOD: CPT

## 2023-02-20 PROCEDURE — 2500000003 HC RX 250 WO HCPCS: Performed by: ANESTHESIOLOGY

## 2023-02-20 PROCEDURE — 6360000002 HC RX W HCPCS: Performed by: ORTHOPAEDIC SURGERY

## 2023-02-20 PROCEDURE — 3700000001 HC ADD 15 MINUTES (ANESTHESIA): Performed by: ORTHOPAEDIC SURGERY

## 2023-02-20 PROCEDURE — 6360000002 HC RX W HCPCS: Performed by: ANESTHESIOLOGY

## 2023-02-20 PROCEDURE — 6370000000 HC RX 637 (ALT 250 FOR IP): Performed by: ANESTHESIOLOGY

## 2023-02-20 PROCEDURE — 7100000011 HC PHASE II RECOVERY - ADDTL 15 MIN: Performed by: ORTHOPAEDIC SURGERY

## 2023-02-20 PROCEDURE — 7100000001 HC PACU RECOVERY - ADDTL 15 MIN: Performed by: ORTHOPAEDIC SURGERY

## 2023-02-20 PROCEDURE — 88305 TISSUE EXAM BY PATHOLOGIST: CPT

## 2023-02-20 PROCEDURE — 87176 TISSUE HOMOGENIZATION CULTR: CPT

## 2023-02-20 PROCEDURE — 2709999900 HC NON-CHARGEABLE SUPPLY: Performed by: ORTHOPAEDIC SURGERY

## 2023-02-20 PROCEDURE — 7100000000 HC PACU RECOVERY - FIRST 15 MIN: Performed by: ORTHOPAEDIC SURGERY

## 2023-02-20 RX ORDER — IPRATROPIUM BROMIDE AND ALBUTEROL SULFATE 2.5; .5 MG/3ML; MG/3ML
1 SOLUTION RESPIRATORY (INHALATION)
Status: DISCONTINUED | OUTPATIENT
Start: 2023-02-20 | End: 2023-02-20 | Stop reason: HOSPADM

## 2023-02-20 RX ORDER — OXYCODONE HYDROCHLORIDE 5 MG/1
5 TABLET ORAL PRN
Status: COMPLETED | OUTPATIENT
Start: 2023-02-20 | End: 2023-02-20

## 2023-02-20 RX ORDER — SODIUM CHLORIDE, SODIUM LACTATE, POTASSIUM CHLORIDE, CALCIUM CHLORIDE 600; 310; 30; 20 MG/100ML; MG/100ML; MG/100ML; MG/100ML
INJECTION, SOLUTION INTRAVENOUS CONTINUOUS
Status: DISCONTINUED | OUTPATIENT
Start: 2023-02-20 | End: 2023-02-20 | Stop reason: HOSPADM

## 2023-02-20 RX ORDER — LABETALOL HYDROCHLORIDE 5 MG/ML
10 INJECTION, SOLUTION INTRAVENOUS
Status: DISCONTINUED | OUTPATIENT
Start: 2023-02-20 | End: 2023-02-20 | Stop reason: HOSPADM

## 2023-02-20 RX ORDER — LIDOCAINE HYDROCHLORIDE 10 MG/ML
2 INJECTION, SOLUTION EPIDURAL; INFILTRATION; INTRACAUDAL; PERINEURAL ONCE
Status: COMPLETED | OUTPATIENT
Start: 2023-02-20 | End: 2023-02-20

## 2023-02-20 RX ORDER — HYDROCODONE BITARTRATE AND ACETAMINOPHEN 5; 325 MG/1; MG/1
1 TABLET ORAL EVERY 6 HOURS PRN
Qty: 28 TABLET | Refills: 0 | Status: SHIPPED | OUTPATIENT
Start: 2023-02-20 | End: 2023-02-27

## 2023-02-20 RX ORDER — SODIUM CHLORIDE 9 MG/ML
25 INJECTION, SOLUTION INTRAVENOUS PRN
Status: DISCONTINUED | OUTPATIENT
Start: 2023-02-20 | End: 2023-02-20 | Stop reason: HOSPADM

## 2023-02-20 RX ORDER — SODIUM CHLORIDE 0.9 % (FLUSH) 0.9 %
5-40 SYRINGE (ML) INJECTION EVERY 12 HOURS SCHEDULED
Status: DISCONTINUED | OUTPATIENT
Start: 2023-02-20 | End: 2023-02-20 | Stop reason: HOSPADM

## 2023-02-20 RX ORDER — ONDANSETRON 2 MG/ML
4 INJECTION INTRAMUSCULAR; INTRAVENOUS
Status: DISCONTINUED | OUTPATIENT
Start: 2023-02-20 | End: 2023-02-20 | Stop reason: HOSPADM

## 2023-02-20 RX ORDER — CEFAZOLIN SODIUM IN 0.9 % NACL 2 G/100 ML
2000 PLASTIC BAG, INJECTION (ML) INTRAVENOUS
Status: COMPLETED | OUTPATIENT
Start: 2023-02-20 | End: 2023-02-20

## 2023-02-20 RX ORDER — CEPHALEXIN 500 MG/1
500 CAPSULE ORAL 3 TIMES DAILY
Qty: 21 CAPSULE | Refills: 0 | Status: SHIPPED | OUTPATIENT
Start: 2023-02-20 | End: 2023-02-27

## 2023-02-20 RX ORDER — FENTANYL CITRATE 50 UG/ML
INJECTION, SOLUTION INTRAMUSCULAR; INTRAVENOUS PRN
Status: DISCONTINUED | OUTPATIENT
Start: 2023-02-20 | End: 2023-02-20 | Stop reason: SDUPTHER

## 2023-02-20 RX ORDER — HYDRALAZINE HYDROCHLORIDE 20 MG/ML
10 INJECTION INTRAMUSCULAR; INTRAVENOUS
Status: DISCONTINUED | OUTPATIENT
Start: 2023-02-20 | End: 2023-02-20 | Stop reason: HOSPADM

## 2023-02-20 RX ORDER — OXYCODONE HYDROCHLORIDE 5 MG/1
10 TABLET ORAL PRN
Status: COMPLETED | OUTPATIENT
Start: 2023-02-20 | End: 2023-02-20

## 2023-02-20 RX ORDER — ONDANSETRON 2 MG/ML
INJECTION INTRAMUSCULAR; INTRAVENOUS PRN
Status: DISCONTINUED | OUTPATIENT
Start: 2023-02-20 | End: 2023-02-20 | Stop reason: SDUPTHER

## 2023-02-20 RX ORDER — GLUCAGON 1 MG/ML
1 KIT INJECTION PRN
Status: DISCONTINUED | OUTPATIENT
Start: 2023-02-20 | End: 2023-02-20 | Stop reason: HOSPADM

## 2023-02-20 RX ORDER — FENTANYL CITRATE 0.05 MG/ML
25 INJECTION, SOLUTION INTRAMUSCULAR; INTRAVENOUS EVERY 5 MIN PRN
Status: DISCONTINUED | OUTPATIENT
Start: 2023-02-20 | End: 2023-02-20 | Stop reason: HOSPADM

## 2023-02-20 RX ORDER — METOCLOPRAMIDE HYDROCHLORIDE 5 MG/ML
10 INJECTION INTRAMUSCULAR; INTRAVENOUS
Status: DISCONTINUED | OUTPATIENT
Start: 2023-02-20 | End: 2023-02-20 | Stop reason: HOSPADM

## 2023-02-20 RX ORDER — MAGNESIUM HYDROXIDE 1200 MG/15ML
LIQUID ORAL CONTINUOUS PRN
Status: COMPLETED | OUTPATIENT
Start: 2023-02-20 | End: 2023-02-20

## 2023-02-20 RX ORDER — MIDAZOLAM HYDROCHLORIDE 1 MG/ML
INJECTION INTRAMUSCULAR; INTRAVENOUS PRN
Status: DISCONTINUED | OUTPATIENT
Start: 2023-02-20 | End: 2023-02-20 | Stop reason: SDUPTHER

## 2023-02-20 RX ORDER — EPHEDRINE SULFATE/0.9% NACL/PF 50 MG/5 ML
SYRINGE (ML) INTRAVENOUS PRN
Status: DISCONTINUED | OUTPATIENT
Start: 2023-02-20 | End: 2023-02-20 | Stop reason: SDUPTHER

## 2023-02-20 RX ORDER — SODIUM CHLORIDE 0.9 % (FLUSH) 0.9 %
5-40 SYRINGE (ML) INJECTION PRN
Status: DISCONTINUED | OUTPATIENT
Start: 2023-02-20 | End: 2023-02-20 | Stop reason: HOSPADM

## 2023-02-20 RX ORDER — NITROFURANTOIN MACROCRYSTALS 50 MG/1
CAPSULE ORAL
Status: ON HOLD | COMMUNITY
Start: 2022-06-29 | End: 2023-02-20 | Stop reason: HOSPADM

## 2023-02-20 RX ORDER — DEXTROSE MONOHYDRATE 100 MG/ML
INJECTION, SOLUTION INTRAVENOUS CONTINUOUS PRN
Status: DISCONTINUED | OUTPATIENT
Start: 2023-02-20 | End: 2023-02-20 | Stop reason: HOSPADM

## 2023-02-20 RX ORDER — PROPOFOL 10 MG/ML
INJECTION, EMULSION INTRAVENOUS PRN
Status: DISCONTINUED | OUTPATIENT
Start: 2023-02-20 | End: 2023-02-20 | Stop reason: SDUPTHER

## 2023-02-20 RX ADMIN — PROPOFOL 180 MG: 10 INJECTION, EMULSION INTRAVENOUS at 13:35

## 2023-02-20 RX ADMIN — FENTANYL CITRATE 25 MCG: 50 INJECTION, SOLUTION INTRAMUSCULAR; INTRAVENOUS at 13:53

## 2023-02-20 RX ADMIN — FENTANYL CITRATE 50 MCG: 50 INJECTION, SOLUTION INTRAMUSCULAR; INTRAVENOUS at 13:35

## 2023-02-20 RX ADMIN — FENTANYL CITRATE 25 MCG: 50 INJECTION, SOLUTION INTRAMUSCULAR; INTRAVENOUS at 14:21

## 2023-02-20 RX ADMIN — OXYCODONE 5 MG: 5 TABLET ORAL at 15:48

## 2023-02-20 RX ADMIN — ONDANSETRON 4 MG: 2 INJECTION INTRAMUSCULAR; INTRAVENOUS at 14:10

## 2023-02-20 RX ADMIN — SODIUM CHLORIDE, POTASSIUM CHLORIDE, SODIUM LACTATE AND CALCIUM CHLORIDE: 600; 310; 30; 20 INJECTION, SOLUTION INTRAVENOUS at 10:41

## 2023-02-20 RX ADMIN — Medication 2000 MG: at 13:41

## 2023-02-20 RX ADMIN — Medication 10 MG: at 14:10

## 2023-02-20 RX ADMIN — LIDOCAINE HYDROCHLORIDE 0.1 ML: 10 INJECTION, SOLUTION EPIDURAL; INFILTRATION; INTRACAUDAL; PERINEURAL at 10:41

## 2023-02-20 RX ADMIN — MIDAZOLAM HYDROCHLORIDE 2 MG: 1 INJECTION, SOLUTION INTRAMUSCULAR; INTRAVENOUS at 13:26

## 2023-02-20 ASSESSMENT — PAIN DESCRIPTION - LOCATION
LOCATION: ELBOW

## 2023-02-20 ASSESSMENT — PAIN SCALES - GENERAL
PAINLEVEL_OUTOF10: 7
PAINLEVEL_OUTOF10: 4
PAINLEVEL_OUTOF10: 7
PAINLEVEL_OUTOF10: 5

## 2023-02-20 ASSESSMENT — PAIN DESCRIPTION - ORIENTATION
ORIENTATION: LEFT

## 2023-02-20 ASSESSMENT — PAIN - FUNCTIONAL ASSESSMENT: PAIN_FUNCTIONAL_ASSESSMENT: 0-10

## 2023-02-20 NOTE — OP NOTE
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       Procedure(s):  LEFT ELBOW INCISION AND DRAINAGE, EXCISION LEFT OLECRANON    Surgeon(s):  Kristy Pulido MD    Assistant:   Physician Assistant: Melinda Carrillo PA-C    Anesthesia: General    Estimated Blood Loss (mL): less than 50     Complications: None    Specimens:   ID Type Source Tests Collected by Time Destination   1 : left OLECRANON Tissue Elbow CULTURE, SURGICAL Kristy Pulido MD 2/20/2023 1409    A : left olecranon  Tissue Elbow SURGICAL PATHOLOGY Kristy Pulido MD 2/20/2023 1357    B : left olecranon swab for culture Swab Elbow SURGICAL PATHOLOGY Kristy Pulido MD 2/20/2023 1411        Implants:  * No implants in log *      Drains: * No LDAs found *    Findings: Crystalline, tophaceous material of the left olecranon bursa    Detailed Description of Procedure: Indications: This is a 64-year-old male with left elbow swelling and pain. The patient was treated as an infected olecranon bursa. The redness continued. I was unable to aspirate any material in the office. I recommended excision of the bursa. The procedure was explained along with the risks, benefits alternatives being reviewed. Potential risk including not limited to infection, neurovascular complication, continued pain as well as potential need for reoperation were all discussed with the patient he consented to the procedure. Procedure: The patient was brought to the operative suite was placed in the supine position. A general anesthetic was administered per anesthesia. The patient was then placed in the lateral decubitus position left side up. The beanbag was deflated to hold the patient lateralward. An axillary roll was placed.   The right leg the down leg was well-padded as was between the legs.  A tourniquet is placed on the left arm proximal and over Webril. A YULI was used to exclude the left arm and upper extremity distal to tourniquet from the rest of the body. The left arm and upper extremity was then sterilely prepped with ChloraPrep and sterilely draped in usual manner. I elevated the arm and inflated the tourniquet to 250 mmHg. Next I made an incision over the olecranon bursa. I swept this medially. Dissection was carried down carefully through the subcutaneous and fatty tissues. I noticed crystalline material consistent with gout. There was some fluid present. This was cultured. Next I excised the bursa with a knife. This was sent for both culture as well as pathology. I debrided some of the tophaceous material off the olecranon itself with a rongeur. I thoroughly evaristo irrigated with normal saline. There was no sinus tract noted. Attention was turned to closure. The tourniquet was released. Hemostasis maintained with gentle pressure. I then repaired some of the deep tissues with 2-0 Prolene. The PA then reapproximated the wound edges with 3-0 nylon. Sterile dressings were applied which consisted of Xeroform 4 x 4's followed by ABD a sterile soft roll followed by one-step long-arm splint and an Ace. The patient procedure well. At the time of this dictation the patient was being awoken from his anesthetic be taken to PACU. He is in stable condition. Due to the complexity of the case the PAs assistance was critical in obtaining the exposure, assisting in the excision of the bursa as well as closure. No qualified resident was available.     Electronically signed by Nina Hernandez MD on 2/20/2023 at 2:17 PM

## 2023-02-20 NOTE — ANESTHESIA POSTPROCEDURE EVALUATION
Department of Anesthesiology  Postprocedure Note    Patient: Meron Jackson  MRN: [id number]  YOB: 1945  Date of evaluation: 2/20/2023      Procedure Summary     Date: 02/20/23 Room / Location: [address]    Anesthesia Start: 1326 Anesthesia Stop: 1537    Procedure: LEFT ELBOW INCISION AND DRAINAGE, DEBRIDEMENT LEFT OLECRANON (Left: Elbow) Diagnosis:       Septic olecranon bursitis of left elbow      (LEFT ELBOW SEPTIC OLECRANON BURSITIS,LEFT ELBOW)    Surgeons: José Miguel Oglesby MD Responsible Provider: Blanca Drummond MD    Anesthesia Type: General ASA Status: 3          Anesthesia Type: General    Adiran Phase I: Adrian Score: 10    Adrian Phase II:        Anesthesia Post Evaluation    Patient location during evaluation: bedside  Patient participation: complete - patient participated  Level of consciousness: awake and awake and alert  Airway patency: patent  Nausea & Vomiting: no nausea and no vomiting  Complications: no  Cardiovascular status: blood pressure returned to baseline and hemodynamically stable  Respiratory status: acceptable  Hydration status: euvolemic

## 2023-02-20 NOTE — PROGRESS NOTES
1600 - Discharge instructions given to patient and wife. No further questions or concerns at this time. Administration time of Oxycodone and when next dose of pain medication due written on discharge instructions.      1605 - sling education provided - x1 assist with patient getting dressed     7594 - Pt getting dressed     4477 - Pt discharged via wheelchair by Linda Retana RN to car with wife

## 2023-02-20 NOTE — H&P
Interval History and Physical    I have interviewed and examined the patient and reviewed the recent History and Physical.  There have been no changes to the recent H&P documentation. The patient understands the planned operation and its associated risks and benefits and agrees to proceed. The surgical consent form has been signed.     BP (!) 140/71   Pulse 65   Temp 97 °F (36.1 °C) (Temporal)   Resp 16   Ht 6' 2\" (1.88 m)   Wt 205 lb (93 kg)   SpO2 95%   BMI 26.32 kg/m²      Electronically signed by Carson Vela MD on 2/20/2023 at 10:47 AM

## 2023-02-20 NOTE — DISCHARGE INSTRUCTIONS
Medication given may have significant effects after discharge. Therefore on the day of surgery:  1) you must be accompanied by a responsible adult upon discharge and for 24 hours after surgery. Do not drive a motor vehicle, operate machinery, power tools or appliance, drink alcoholic beverages, or make critical decisions for 24 hours  2) Be aware of dizziness, which may cause a fall. Change positions slowly. 3) Eating: you may resume your regular diet but it is better to increase intake slowly with mild foods and working up to your regular diet. No greasy, fried or spicy foods today. 4) Nausea/Vomiting: Nausea and vomiting may occur as you become more active or begin to increase food intake. If this should happen, decrease activity and return to liquids. If the problem persists, call your surgeon  5) Pain: Your surgeon may have given you a prescription for pain medication. Take pain medication with food as prescribed. Pain medication may cause constipation, so drink plenty of fluids. If your pain medication does not provide adequate relief, call your surgeon  6) Urinating: Notify your surgeon if you have not urinated within 12 hours after discharge  7) Ice: Apply ice to operative site for 20 min 5-6 times a day or use Polar care as instructed  8) Dressing:   []   Remove dressing in 24hr    []  Remove dressing in 48hr   []  Leave open to air after initial dressing is taken off and incision is dry  Do not remove the steri-strips. (no bath/ hot tubs/ pools)   []  Leave dressing in place.  Keep dressing/ incision clean and dry    9) Activity    Shoulder/ elbow/Hand   []  Elevate extremity    []  Sling   [] at all times (except for exercises and showering)  [] as needed only for comfort   [] Begin daily motion exercises out of sling as instructed   []  Bend and flex fingers/ wrist/elbow frequently   [] other   Knee/ Ankle/ Foot   [] elevate extremity   [] crutches        [] non-weight bearing to operative extremity [] partial weight bearing  [] Full weight bearing as tolerated   []  Use brace whenever walking   [] other      10) Begin physical therapy if advised by you physician:   [] before returning to see you doctor   [] not until you follow up with your doctor    11) call your doctor at [phone] for an appointment (or follow up as scheduled)    [facility] office if  Increased redness, swelling, drainage of any kind, and/or pain to surgery site. As well as new onset fevers and or chills. These could signify an infection. Calf or thigh tenderness to touch as well as increased swelling or redness. This could signify a clot formation. Numbness or tingling to an area around the incision site or below the incision site (toes). Or if the operative extremity becomes cold, blue. Any rash appears, increased  or new onset nausea/vomiting occur. This may indicate a reaction to a medication. Temp is 38.5 C (101F)  12) If you have any concerns or questions, please call [facility] surgeon on call.  The 24- hour phone is [phone]) If you are unable to contact your surgeon, in an emergency situation, go to the nearest hospital

## 2023-02-20 NOTE — ANESTHESIA PRE PROCEDURE
Department of Anesthesiology  Preprocedure Note       Name:  Abby Meier   Age:  76 y.o.  :  1945                                          MRN:  [id number]         Date:  2023      Surgeon: Day Hu):  Umair Costa MD    Procedure: Procedure(s):  LEFT ELBOW INCISION AND DRAINAGE, DEBRIDEMENT LEFT OLECRANON BURSA  (PAT ON ADMIT)    Medications prior to admission:   Prior to Admission medications    Medication Sig Start Date End Date Taking? Authorizing Provider   nitrofurantoin (MACRODANTIN) 50 MG capsule Take by mouth 22  Yes Historical Provider, MD   HYDROcodone-acetaminophen (NORCO) 5-325 MG per tablet Take 1 tablet by mouth every 6 hours as needed for Pain for up to 7 days. Intended supply: 5 days. Take lowest dose possible to manage pain Max Daily Amount: 4 tablets 23 Yes DINORA Romero Natural Products (PROSTATE SUPPORT PO) Take by mouth    Historical Provider, MD   albuterol (PROVENTIL) (2.5 MG/3ML) 0.083% nebulizer solution Inhale into the lungs    Historical Provider, MD   finasteride (PROSCAR) 5 MG tablet TAKE 1 TABLET DAILY.  22   Historical Provider, MD   lansoprazole (PREVACID) 15 MG delayed release capsule Take 15 mg by mouth daily 22  Historical Provider, MD   lisinopril (PRINIVIL;ZESTRIL) 10 MG tablet Take 10 mg by mouth daily    Historical Provider, MD   pantoprazole (PROTONIX) 40 MG tablet Take 1 tablet by mouth every morning (before breakfast) 22   Malaika Alexandra Left, APRN - CNP   naproxen (NAPROSYN) 500 MG tablet Take by mouth 20   Historical Provider, MD   tamsulosin (FLOMAX) 0.4 MG capsule Take by mouth 10/26/20   Historical Provider, MD   montelukast (SINGULAIR) 10 MG tablet Take 10 mg by mouth nightly    Historical Provider, MD   metoprolol succinate (TOPROL XL) 25 MG extended release tablet take 1 tablet daily 19   Historical Provider, MD   folic acid (FOLVITE) 1 MG tablet Take 1 mg by mouth daily    Historical Provider, MD   Nebulizer MISC by Does not apply route    Historical Provider, MD   ipratropium-albuterol (DUONEB) 0.5-2.5 (3) MG/3ML SOLN nebulizer solution Inhale 1 vial into the lungs every 4 hours    Historical Provider, MD   Misc Natural Products (PROSTATE SUPPORT PO) Take by mouth 2 times daily    Historical Provider, MD   vitamin D-3 (CHOLECALCIFEROL) 5000 UNITS TABS Take 5,000 Units by mouth daily. Historical Provider, MD   methotrexate (RHEUMATREX) 2.5 MG chemo tablet Take 10 mg by mouth once a week  Patient not taking: Reported on 2/20/2023 10/25/12   Jessica Jay MD   aspirin 81 MG tablet Take 81 mg by mouth daily. Historical Provider, MD   magnesium oxide (MAG-OX) 400 MG tablet Take 400 mg by mouth daily. Historical Provider, MD       Current medications:    Current Facility-Administered Medications   Medication Dose Route Frequency Provider Last Rate Last Admin    ceFAZolin (ANCEF) 2000 mg in 0.9% sodium chloride 100 mL IVPB  2,000 mg IntraVENous On Call to Andry Cabrera MD        lactated ringers IV soln infusion   IntraVENous Continuous Bernadene Bloch,  mL/hr at 02/20/23 1041 New Bag at 02/20/23 1041       Allergies:     Allergies   Allergen Reactions    Seasonal      Sinus congestion    Statins      MYALGIAS       Problem List:    Patient Active Problem List   Diagnosis Code    CAD (coronary artery disease) I25.10    Hypertension I10    Eczema L30.9    Rheumatoid arthritis ([facility] [address] M06.9    BPH (benign prostatic hyperplasia) N40.0    Neuropathy G62.9    Vitamin D deficiency E55.9    CTS (carpal tunnel syndrome) G56.00    Lumbar spondylosis M47.816    Arthritis of shoulder region M19.019    Trigger index finger of left hand M65.322    Statin intolerance Z78.9    CKD (chronic kidney disease) stage 3, GFR 30-59 ml/min ([facility]) N18.30    Chronic back pain M54.9, G89.29    Elevated PSA R97.20    RLS (restless legs syndrome) G25.81    Carpal tunnel syndrome of right wrist G56.01    Former smoker, stopped smoking in distant past Z87.891    Spinal stenosis of lumbar region with neurogenic claudication M48.062    Adhesive capsulitis of right shoulder M75.01    Chronic obstructive pulmonary disease (HCC) J44.9    Hip pain M25.559    Idiopathic peripheral autonomic neuropathy G90.09       Past Medical History:        Diagnosis Date    Arthritis     R/A    CAD (coronary artery disease)     cardiac stents x 3    Chronic back pain     COPD (chronic obstructive pulmonary disease) ([facility])     past smoker > 25 yr habit / quit 25 yrs ago    COPD (chronic obstructive pulmonary disease) ([facility] [address]     Coronary artery disease     Hemorrhoids     Hyperlipidemia     past trx / cannot tolerate statins    Hypertension     meds > 10 yrs / denies TIA or stroke    Neuropathy     PMR (polymyalgia rheumatica) ([facility])     Restless leg syndrome        Past Surgical History:        Procedure Laterality Date    CARDIAC SURGERY      2-3 cardiac stents    CARPAL TUNNEL RELEASE      EYE SURGERY  right    retina hole / Phaco with IOL     RI NEUROPLASTY &/TRANSPOS MEDIAN NRV CARPAL TUNNE Right 2018    RIGHT WRIST CARPAL TUNNEL RELEASE performed by Trupti Song MD at [address] [place] [address] PTCA      2-3 cardiac stents       Social History:    Social History     Tobacco Use    Smoking status: Former     Packs/day: 1.00     Years: 25.00     Pack years: 25.00     Types: Cigarettes     Quit date: 1992     Years since quittin.0    Smokeless tobacco: Never   Substance Use Topics    Alcohol use: Not Currently     Comment: occasional                                 Counseling given: Not Answered      Vital Signs (Current):   Vitals:    23 0947   BP: (!) 140/71   Pulse: 65   Resp: 16   Temp: 97 °F (36.1 °C)   TempSrc: Temporal   SpO2: 95%   Weight: 205 lb (93 kg)   Height: 6' 2\" (1.88 m)                                              BP Readings from Last 3 Encounters:   23 (!) 140/71   01/11/23 (!) 102/58   11/01/22 134/84       NPO Status: Time of last liquid consumption: 2200                        Time of last solid consumption: 2200                        Date of last liquid consumption: 02/19/23                        Date of last solid food consumption: 02/19/23    BMI:   Wt Readings from Last 3 Encounters:   02/20/23 205 lb (93 kg)   01/11/23 210 lb (95.3 kg)   11/01/22 201 lb 9.6 oz (91.4 kg)     Body mass index is 26.32 kg/m². CBC:   Lab Results   Component Value Date/Time    WBC 9.0 02/16/2023 11:46 AM    WBC 11.7 11/01/2022 10:54 AM    RBC 4.35 02/16/2023 11:46 AM    RBC 4.22 06/20/2018 02:07 PM    HGB 13.5 02/16/2023 11:46 AM    HCT 42.3 02/16/2023 11:46 AM    MCV 97 02/16/2023 11:46 AM    RDW 15.2 11/01/2022 10:54 AM     02/16/2023 11:46 AM       CMP:   Lab Results   Component Value Date/Time     02/16/2023 11:46 AM    K 4.5 02/16/2023 11:46 AM     02/16/2023 11:46 AM    CO2 26 11/01/2022 10:54 AM    BUN 27 11/01/2022 10:54 AM    CREATININE 1.31 02/16/2023 11:46 AM    GFRAA 38.8 12/25/2021 09:30 PM    AGRATIO 1.4 06/20/2018 02:07 PM    LABGLOM 50.2 11/01/2022 10:54 AM    GLUCOSE 93 02/16/2023 11:46 AM    PROT 7.2 02/16/2023 11:46 AM    CALCIUM 9.6 02/16/2023 11:46 AM    BILITOT 0.3 02/16/2023 11:46 AM    ALKPHOS 45 02/16/2023 11:46 AM    AST 18 02/16/2023 11:46 AM    ALT 13 02/16/2023 11:46 AM       POC Tests: No results for input(s): POCGLU, POCNA, POCK, POCCL, POCBUN, POCHEMO, POCHCT in the last 72 hours.     Coags:   Lab Results   Component Value Date/Time    PROTIME 12.7 02/16/2023 11:46 AM    INR 1.1 02/16/2023 11:46 AM    APTT 32 02/16/2023 11:46 AM       HCG (If Applicable): No results found for: PREGTESTUR, PREGSERUM, HCG, HCGQUANT     ABGs: No results found for: PHART, PO2ART, OVB2DHO, MQS7CXH, BEART, W7CDQSTP     Type & Screen (If Applicable):  No results found for: LABABO, LABRH    Drug/Infectious Status (If Applicable):  No results found for: HIV, HEPCAB    COVID-19 Screening (If Applicable):   Lab Results   Component Value Date/Time    COVID19 NOT DETECTED 02/21/2022 08:15 PM           Anesthesia Evaluation  Patient summary reviewed and Nursing notes reviewed no history of anesthetic complications:   Airway: Mallampati: II  TM distance: >3 FB   Neck ROM: full  Mouth opening: > = 3 FB   Dental: normal exam         Pulmonary:Negative Pulmonary ROS and normal exam    (+) COPD:                             Cardiovascular:Negative CV ROS  Exercise tolerance: good (>4 METS),   (+) hypertension:, CAD:,       ECG reviewed               Beta Blocker:  Not on Beta Blocker         Neuro/Psych:   Negative Neuro/Psych ROS  (+) neuromuscular disease:,             GI/Hepatic/Renal: Neg GI/Hepatic/Renal ROS            Endo/Other: Negative Endo/Other ROS   (+) : arthritis:., .          Pt had PAT visit. Abdominal:             Vascular: negative vascular ROS. Other Findings:           Anesthesia Plan      general     ASA 3     (ETT)  Induction: intravenous. MIPS: Postoperative opioids intended and Prophylactic antiemetics administered. Anesthetic plan and risks discussed with patient.       Plan discussed with surgical team.    Attending anesthesiologist reviewed and agrees with Pre Eval content                Omar Caro MD   2/20/2023

## 2023-02-26 LAB
CULTURE SURGICAL: NORMAL
CULTURE SURGICAL: NORMAL

## 2023-05-19 LAB — URATE (MG/DL) IN SER/PLAS: 5.8 MG/DL (ref 4–7.5)

## 2023-06-08 LAB
APPEARANCE, URINE: CLEAR
BILIRUBIN, URINE: NEGATIVE
BLOOD, URINE: NEGATIVE
COLOR, URINE: YELLOW
GLUCOSE, URINE: NEGATIVE MG/DL
KETONES, URINE: NEGATIVE MG/DL
LEUKOCYTE ESTERASE, URINE: NEGATIVE
NITRITE, URINE: NEGATIVE
PH, URINE: 5 (ref 5–8)
PROSTATE SPECIFIC AG (NG/ML) IN SER/PLAS: 6.64 NG/ML (ref 0–4)
PROTEIN, URINE: NEGATIVE MG/DL
SPECIFIC GRAVITY, URINE: 1.02 (ref 1–1.03)
UROBILINOGEN, URINE: <2 MG/DL (ref 0–1.9)

## 2023-06-09 LAB — URINE CULTURE: NO GROWTH

## 2023-08-29 ENCOUNTER — OFFICE VISIT (OUTPATIENT)
Dept: FAMILY MEDICINE CLINIC | Age: 78
End: 2023-08-29
Payer: MEDICARE

## 2023-08-29 VITALS
BODY MASS INDEX: 27.21 KG/M2 | OXYGEN SATURATION: 93 % | DIASTOLIC BLOOD PRESSURE: 64 MMHG | SYSTOLIC BLOOD PRESSURE: 120 MMHG | HEART RATE: 53 BPM | TEMPERATURE: 97.6 F | HEIGHT: 74 IN | WEIGHT: 212 LBS

## 2023-08-29 DIAGNOSIS — I63.9 CEREBRAL INFARCTION, UNSPECIFIED MECHANISM (HCC): ICD-10-CM

## 2023-08-29 DIAGNOSIS — Z09 HOSPITAL DISCHARGE FOLLOW-UP: Primary | ICD-10-CM

## 2023-08-29 PROCEDURE — 1036F TOBACCO NON-USER: CPT | Performed by: NURSE PRACTITIONER

## 2023-08-29 PROCEDURE — G8417 CALC BMI ABV UP PARAM F/U: HCPCS | Performed by: NURSE PRACTITIONER

## 2023-08-29 PROCEDURE — 3078F DIAST BP <80 MM HG: CPT | Performed by: NURSE PRACTITIONER

## 2023-08-29 PROCEDURE — G8427 DOCREV CUR MEDS BY ELIG CLIN: HCPCS | Performed by: NURSE PRACTITIONER

## 2023-08-29 PROCEDURE — 99214 OFFICE O/P EST MOD 30 MIN: CPT | Performed by: NURSE PRACTITIONER

## 2023-08-29 PROCEDURE — 1111F DSCHRG MED/CURRENT MED MERGE: CPT | Performed by: NURSE PRACTITIONER

## 2023-08-29 PROCEDURE — 3074F SYST BP LT 130 MM HG: CPT | Performed by: NURSE PRACTITIONER

## 2023-08-29 PROCEDURE — 1123F ACP DISCUSS/DSCN MKR DOCD: CPT | Performed by: NURSE PRACTITIONER

## 2023-08-29 RX ORDER — ALLOPURINOL 100 MG/1
100 TABLET ORAL
COMMUNITY
Start: 2023-08-28 | End: 2023-08-29

## 2023-08-29 RX ORDER — EZETIMIBE 10 MG/1
10 TABLET ORAL DAILY
COMMUNITY
Start: 2023-08-09 | End: 2023-08-29 | Stop reason: SDUPTHER

## 2023-08-29 RX ORDER — CLOPIDOGREL BISULFATE 75 MG/1
75 TABLET ORAL DAILY
COMMUNITY
Start: 2023-08-09 | End: 2023-08-29 | Stop reason: SDUPTHER

## 2023-08-29 RX ORDER — GABAPENTIN 600 MG/1
600 TABLET ORAL 3 TIMES DAILY
COMMUNITY
Start: 2023-07-20

## 2023-08-29 RX ORDER — CLOPIDOGREL BISULFATE 75 MG/1
75 TABLET ORAL DAILY
Qty: 30 TABLET | Refills: 2 | Status: SHIPPED | OUTPATIENT
Start: 2023-08-29

## 2023-08-29 RX ORDER — FINASTERIDE 5 MG/1
5 TABLET, FILM COATED ORAL DAILY
COMMUNITY

## 2023-08-29 RX ORDER — OMEPRAZOLE 40 MG/1
40 CAPSULE, DELAYED RELEASE ORAL DAILY
COMMUNITY

## 2023-08-29 RX ORDER — EZETIMIBE 10 MG/1
10 TABLET ORAL DAILY
Qty: 30 TABLET | Refills: 2 | Status: SHIPPED | OUTPATIENT
Start: 2023-08-29

## 2023-08-29 SDOH — ECONOMIC STABILITY: FOOD INSECURITY: WITHIN THE PAST 12 MONTHS, YOU WORRIED THAT YOUR FOOD WOULD RUN OUT BEFORE YOU GOT MONEY TO BUY MORE.: NEVER TRUE

## 2023-08-29 SDOH — ECONOMIC STABILITY: HOUSING INSECURITY
IN THE LAST 12 MONTHS, WAS THERE A TIME WHEN YOU DID NOT HAVE A STEADY PLACE TO SLEEP OR SLEPT IN A SHELTER (INCLUDING NOW)?: NO

## 2023-08-29 SDOH — ECONOMIC STABILITY: FOOD INSECURITY: WITHIN THE PAST 12 MONTHS, THE FOOD YOU BOUGHT JUST DIDN'T LAST AND YOU DIDN'T HAVE MONEY TO GET MORE.: NEVER TRUE

## 2023-08-29 SDOH — ECONOMIC STABILITY: INCOME INSECURITY: HOW HARD IS IT FOR YOU TO PAY FOR THE VERY BASICS LIKE FOOD, HOUSING, MEDICAL CARE, AND HEATING?: NOT HARD AT ALL

## 2023-08-29 ASSESSMENT — ENCOUNTER SYMPTOMS
CHEST TIGHTNESS: 0
SHORTNESS OF BREATH: 0

## 2023-08-29 NOTE — PROGRESS NOTES
thickening is present in the inferior maxillary sinuses. No  abnormal fluid signal is present in the mastoid air cells bilaterally. MRA of head: Within limits of somewhat motion degraded exam, there is symmetric  flow enhancement within the intracranial carotid arteries bilaterally  without evidence of focal occlusion. The visualized anterior cerebral arteries demonstrate symmetric flow  enhancement proximally without focal occlusion. Middle cerebral arteries are symmetric in appearance within the  proximal segments bilaterally, without evidence of significant  stenosis in the M1 segments or focal occlusion in the more distal  cortical M2 and M3 branches. There is anatomic variant dominant left vertebral artery, with  hypoplastic appearance of the right intracranial vertebral artery. Basilar artery does not demonstrate any focal occlusion. The posterior cerebral arteries are symmetric in appearance  proximally without focal occlusion. MRA of neck:    Exam is somewhat degraded by motion, significantly limiting  evaluation of the diminutive right vertebral artery. Visualized common carotid arteries demonstrate symmetrically  diminished flow enhancement bilaterally, favored to be artifactual,  although mild luminal narrowing due to atherosclerotic changes can  not be entirely excluded. No focal occlusion is present. There is symmetrically diminished flow enhancement present near the  carotid bifurcations in the extracranial internal carotid arteries  bilaterally, likely contributing to mild stenosis by NASCET criteria,  without evidence of focal vessel occlusion. Faint flow enhancement is present in the region of the right  vertebral artery, favored to be secondary to markedly diminutive  appearance of the vessel. No focal occlusion or stenosis is present  in the large left vertebral artery.          Imaging Results - MR brain wo IV contrast (08/08/2023 7:53 PM EDT)  Procedure Note

## 2023-10-04 ENCOUNTER — HOSPITAL ENCOUNTER (OUTPATIENT)
Dept: CARDIOLOGY | Facility: HOSPITAL | Age: 78
Discharge: HOME | End: 2023-10-04
Payer: MEDICARE

## 2023-10-04 DIAGNOSIS — G45.8 OTHER TRANSIENT CEREBRAL ISCHEMIC ATTACKS AND RELATED SYNDROMES: ICD-10-CM

## 2023-10-04 DIAGNOSIS — G45.9 TRANSIENT CEREBRAL ISCHEMIC ATTACK, UNSPECIFIED: ICD-10-CM

## 2023-10-04 LAB — EJECTION FRACTION APICAL 4 CHAMBER: 47

## 2023-10-04 PROCEDURE — 93306 TTE W/DOPPLER COMPLETE: CPT

## 2023-10-04 PROCEDURE — 93306 TTE W/DOPPLER COMPLETE: CPT | Performed by: INTERNAL MEDICINE

## 2023-11-05 DIAGNOSIS — I63.9 CEREBRAL INFARCTION, UNSPECIFIED MECHANISM (HCC): ICD-10-CM

## 2023-11-06 RX ORDER — EZETIMIBE 10 MG/1
10 TABLET ORAL DAILY
Qty: 30 TABLET | Refills: 2 | Status: SHIPPED | OUTPATIENT
Start: 2023-11-06

## 2023-11-15 ENCOUNTER — LAB (OUTPATIENT)
Dept: LAB | Facility: LAB | Age: 78
End: 2023-11-15
Payer: MEDICARE

## 2023-11-15 DIAGNOSIS — I10 ESSENTIAL (PRIMARY) HYPERTENSION: Primary | ICD-10-CM

## 2023-11-15 DIAGNOSIS — J44.9 CHRONIC OBSTRUCTIVE PULMONARY DISEASE, UNSPECIFIED (MULTI): ICD-10-CM

## 2023-11-15 DIAGNOSIS — I25.10 ATHEROSCLEROTIC HEART DISEASE OF NATIVE CORONARY ARTERY WITHOUT ANGINA PECTORIS: ICD-10-CM

## 2023-11-15 DIAGNOSIS — I10 ESSENTIAL (PRIMARY) HYPERTENSION: ICD-10-CM

## 2023-11-15 DIAGNOSIS — R97.20 ELEVATED PROSTATE SPECIFIC ANTIGEN (PSA): Primary | ICD-10-CM

## 2023-11-15 DIAGNOSIS — N40.0 BENIGN PROSTATIC HYPERPLASIA WITHOUT LOWER URINARY TRACT SYMPTOMS: ICD-10-CM

## 2023-11-15 LAB
ALBUMIN SERPL BCP-MCNC: 3.9 G/DL (ref 3.4–5)
ALP SERPL-CCNC: 60 U/L (ref 33–136)
ALT SERPL W P-5'-P-CCNC: 15 U/L (ref 10–52)
ANION GAP SERPL CALC-SCNC: 14 MMOL/L (ref 10–20)
AST SERPL W P-5'-P-CCNC: 21 U/L (ref 9–39)
BILIRUB SERPL-MCNC: 0.6 MG/DL (ref 0–1.2)
BUN SERPL-MCNC: 29 MG/DL (ref 6–23)
CALCIUM SERPL-MCNC: 9.2 MG/DL (ref 8.6–10.3)
CHLORIDE SERPL-SCNC: 109 MMOL/L (ref 98–107)
CO2 SERPL-SCNC: 25 MMOL/L (ref 21–32)
CREAT SERPL-MCNC: 1.3 MG/DL (ref 0.5–1.3)
GFR SERPL CREATININE-BSD FRML MDRD: 56 ML/MIN/1.73M*2
GLUCOSE SERPL-MCNC: 98 MG/DL (ref 74–99)
POTASSIUM SERPL-SCNC: 4.6 MMOL/L (ref 3.5–5.3)
PROT SERPL-MCNC: 6.3 G/DL (ref 6.4–8.2)
PSA SERPL-MCNC: 5.55 NG/ML
SODIUM SERPL-SCNC: 143 MMOL/L (ref 136–145)

## 2023-11-15 PROCEDURE — 84153 ASSAY OF PSA TOTAL: CPT

## 2023-11-15 PROCEDURE — 36415 COLL VENOUS BLD VENIPUNCTURE: CPT

## 2023-11-15 PROCEDURE — 80053 COMPREHEN METABOLIC PANEL: CPT

## 2023-11-27 PROBLEM — R42 DIZZINESS: Status: ACTIVE | Noted: 2023-11-27

## 2023-11-27 PROBLEM — R91.8 ABNORMAL CHEST X-RAY WITH MULTIPLE NODULES: Status: ACTIVE | Noted: 2023-11-27

## 2023-11-27 PROBLEM — N39.0 UTI (URINARY TRACT INFECTION): Status: ACTIVE | Noted: 2023-11-27

## 2023-11-27 PROBLEM — I21.4 NON Q WAVE MYOCARDIAL INFARCTION (MULTI): Status: ACTIVE | Noted: 2023-11-27

## 2023-11-27 PROBLEM — M51.369 OTHER INTERVERTEBRAL DISC DEGENERATION, LUMBAR REGION: Status: ACTIVE | Noted: 2023-11-27

## 2023-11-27 PROBLEM — R55 SYNCOPE AND COLLAPSE: Status: ACTIVE | Noted: 2023-11-27

## 2023-11-27 PROBLEM — M48.061 LUMBAR STENOSIS: Status: ACTIVE | Noted: 2023-11-27

## 2023-11-27 PROBLEM — M75.01 ADHESIVE CAPSULITIS OF RIGHT SHOULDER: Status: ACTIVE | Noted: 2020-12-08

## 2023-11-27 PROBLEM — M51.36 OTHER INTERVERTEBRAL DISC DEGENERATION, LUMBAR REGION: Status: ACTIVE | Noted: 2023-11-27

## 2023-11-27 PROBLEM — M48.062 SPINAL STENOSIS OF LUMBAR REGION WITH NEUROGENIC CLAUDICATION: Status: ACTIVE | Noted: 2020-12-08

## 2023-11-27 PROBLEM — N50.819 PAINFUL TESTIS: Status: ACTIVE | Noted: 2023-11-27

## 2023-11-27 PROBLEM — J44.9 CHRONIC OBSTRUCTIVE PULMONARY DISEASE (MULTI): Status: ACTIVE | Noted: 2021-02-26

## 2023-11-27 PROBLEM — M54.16 LUMBAR RADICULAR PAIN: Status: ACTIVE | Noted: 2023-11-27

## 2023-11-27 PROBLEM — N50.89 EPIDIDYMAL MASS: Status: ACTIVE | Noted: 2023-11-27

## 2023-11-27 PROBLEM — R36.1 HEMATOSPERMIA: Status: ACTIVE | Noted: 2023-11-27

## 2023-11-27 PROBLEM — R63.4 WEIGHT LOSS: Status: ACTIVE | Noted: 2022-11-07

## 2023-11-27 PROBLEM — R82.89 ABNORMAL URINE CYTOLOGY: Status: ACTIVE | Noted: 2023-11-27

## 2023-11-27 PROBLEM — M54.50 LOW BACK PAIN: Status: ACTIVE | Noted: 2023-11-27

## 2023-11-27 PROBLEM — G25.81 RLS (RESTLESS LEGS SYNDROME): Chronic | Status: ACTIVE | Noted: 2018-01-03

## 2023-11-27 PROBLEM — R53.83 FATIGUE: Status: ACTIVE | Noted: 2023-11-27

## 2023-11-27 PROBLEM — R39.15 URINARY URGENCY: Status: ACTIVE | Noted: 2023-11-27

## 2023-11-27 PROBLEM — R20.0 LEG NUMBNESS: Status: ACTIVE | Noted: 2023-11-27

## 2023-11-27 PROBLEM — M25.559 HIP PAIN: Status: ACTIVE | Noted: 2018-06-20

## 2023-11-27 PROBLEM — E78.2 MIXED HYPERLIPIDEMIA: Status: ACTIVE | Noted: 2023-11-27

## 2023-11-27 PROBLEM — G90.09 IDIOPATHIC PERIPHERAL AUTONOMIC NEUROPATHY: Status: ACTIVE | Noted: 2022-10-18

## 2023-11-27 PROBLEM — N40.2 PROSTATE NODULE: Status: ACTIVE | Noted: 2023-11-27

## 2023-11-27 RX ORDER — OMEPRAZOLE 40 MG/1
1 CAPSULE, DELAYED RELEASE ORAL DAILY
COMMUNITY

## 2023-11-27 RX ORDER — PREDNISONE 10 MG/1
TABLET ORAL
COMMUNITY
End: 2024-02-15 | Stop reason: ENTERED-IN-ERROR

## 2023-11-27 RX ORDER — FINASTERIDE 5 MG/1
5 TABLET, FILM COATED ORAL DAILY
COMMUNITY
End: 2024-02-15 | Stop reason: ENTERED-IN-ERROR

## 2023-11-27 RX ORDER — CEPHALEXIN 500 MG/1
500 CAPSULE ORAL 3 TIMES DAILY
COMMUNITY
Start: 2023-02-20 | End: 2023-02-27

## 2023-11-27 RX ORDER — CLOPIDOGREL BISULFATE 75 MG/1
75 TABLET ORAL DAILY
COMMUNITY
End: 2024-01-04 | Stop reason: SDUPTHER

## 2023-11-27 RX ORDER — NITROFURANTOIN MACROCRYSTALS 50 MG/1
1 CAPSULE ORAL DAILY
COMMUNITY
Start: 2022-06-29 | End: 2024-02-15 | Stop reason: ENTERED-IN-ERROR

## 2023-11-27 RX ORDER — LISINOPRIL 10 MG/1
1 TABLET ORAL DAILY
COMMUNITY
Start: 2008-06-11 | End: 2024-01-30 | Stop reason: WASHOUT

## 2023-11-27 RX ORDER — HYDROCODONE BITARTRATE AND ACETAMINOPHEN 5; 325 MG/1; MG/1
TABLET ORAL
COMMUNITY
Start: 2023-02-20 | End: 2024-02-15 | Stop reason: ENTERED-IN-ERROR

## 2023-11-27 RX ORDER — DEXTROMETHORPHAN HYDROBROMIDE, GUAIFENESIN 5; 100 MG/5ML; MG/5ML
LIQUID ORAL 4 TIMES DAILY
COMMUNITY
End: 2024-02-15 | Stop reason: ENTERED-IN-ERROR

## 2023-11-27 RX ORDER — FAMOTIDINE 40 MG/1
40 TABLET, FILM COATED ORAL DAILY
COMMUNITY

## 2023-11-27 RX ORDER — EZETIMIBE 10 MG/1
1 TABLET ORAL DAILY
COMMUNITY
Start: 2023-11-06 | End: 2024-02-08 | Stop reason: SDUPTHER

## 2023-11-27 RX ORDER — AZITHROMYCIN 250 MG/1
TABLET, FILM COATED ORAL
COMMUNITY
Start: 2023-07-26 | End: 2024-02-15 | Stop reason: ENTERED-IN-ERROR

## 2023-11-27 RX ORDER — ALBUTEROL SULFATE 90 UG/1
AEROSOL, METERED RESPIRATORY (INHALATION)
COMMUNITY
Start: 2022-01-12 | End: 2024-02-15 | Stop reason: ENTERED-IN-ERROR

## 2023-11-27 RX ORDER — METOPROLOL SUCCINATE 25 MG/1
25 TABLET, EXTENDED RELEASE ORAL DAILY
COMMUNITY
End: 2024-01-24 | Stop reason: SDUPTHER

## 2023-11-27 RX ORDER — TAMSULOSIN HYDROCHLORIDE 0.4 MG/1
0.4 CAPSULE ORAL DAILY
COMMUNITY

## 2023-11-27 RX ORDER — METRONIDAZOLE 500 MG/1
500 TABLET ORAL 4 TIMES DAILY
COMMUNITY
Start: 2023-04-03 | End: 2024-02-15 | Stop reason: ENTERED-IN-ERROR

## 2023-11-27 RX ORDER — ALBUTEROL SULFATE 0.83 MG/ML
2.5 SOLUTION RESPIRATORY (INHALATION)
COMMUNITY
Start: 2017-06-20 | End: 2024-02-15 | Stop reason: ENTERED-IN-ERROR

## 2023-11-27 RX ORDER — AZELASTINE 1 MG/ML
SPRAY, METERED NASAL
COMMUNITY
Start: 2022-04-01 | End: 2024-02-15 | Stop reason: ENTERED-IN-ERROR

## 2023-11-27 RX ORDER — DOXYCYCLINE HYCLATE 100 MG
100 TABLET ORAL 2 TIMES DAILY
COMMUNITY
Start: 2023-02-09 | End: 2023-02-19

## 2023-11-27 RX ORDER — ALLOPURINOL 100 MG/1
100 TABLET ORAL DAILY
COMMUNITY
Start: 2023-11-02 | End: 2024-04-09 | Stop reason: WASHOUT

## 2023-11-27 RX ORDER — IPRATROPIUM BROMIDE AND ALBUTEROL SULFATE 2.5; .5 MG/3ML; MG/3ML
3 SOLUTION RESPIRATORY (INHALATION) 4 TIMES DAILY PRN
COMMUNITY
Start: 2022-01-12

## 2023-11-27 RX ORDER — ACETAMINOPHEN 500 MG
1 TABLET ORAL DAILY
COMMUNITY
Start: 2014-12-05 | End: 2024-02-15 | Stop reason: ENTERED-IN-ERROR

## 2023-11-27 RX ORDER — GABAPENTIN 600 MG/1
300 TABLET ORAL 3 TIMES DAILY
COMMUNITY

## 2023-11-27 RX ORDER — GABAPENTIN 300 MG/1
CAPSULE ORAL
COMMUNITY
Start: 2022-04-12 | End: 2024-02-15 | Stop reason: ENTERED-IN-ERROR

## 2023-11-27 RX ORDER — FOLIC ACID 1 MG/1
1 TABLET ORAL DAILY
COMMUNITY
Start: 2008-06-11 | End: 2024-02-15 | Stop reason: ENTERED-IN-ERROR

## 2023-11-27 RX ORDER — CEFUROXIME AXETIL 500 MG/1
500 TABLET ORAL 2 TIMES DAILY
COMMUNITY
Start: 2023-02-11 | End: 2023-02-21

## 2023-11-27 RX ORDER — LANOLIN ALCOHOL/MO/W.PET/CERES
1 CREAM (GRAM) TOPICAL DAILY
COMMUNITY
Start: 2014-12-05

## 2023-11-27 RX ORDER — MONTELUKAST SODIUM 10 MG/1
10 TABLET ORAL NIGHTLY
COMMUNITY

## 2023-11-27 RX ORDER — HYDROCHLOROTHIAZIDE 12.5 MG/1
12.5 TABLET ORAL
COMMUNITY
Start: 2022-03-10 | End: 2024-02-08 | Stop reason: SDUPTHER

## 2023-11-27 RX ORDER — TETRACYCLINE HYDROCHLORIDE 500 MG/1
500 CAPSULE ORAL 4 TIMES DAILY
COMMUNITY
Start: 2023-04-03 | End: 2024-02-15 | Stop reason: ENTERED-IN-ERROR

## 2023-11-29 ENCOUNTER — OFFICE VISIT (OUTPATIENT)
Dept: FAMILY MEDICINE CLINIC | Age: 78
End: 2023-11-29
Payer: MEDICARE

## 2023-11-29 VITALS
HEART RATE: 61 BPM | SYSTOLIC BLOOD PRESSURE: 116 MMHG | DIASTOLIC BLOOD PRESSURE: 62 MMHG | WEIGHT: 201 LBS | RESPIRATION RATE: 17 BRPM | OXYGEN SATURATION: 98 % | BODY MASS INDEX: 25.8 KG/M2 | HEIGHT: 74 IN

## 2023-11-29 DIAGNOSIS — N18.31 STAGE 3A CHRONIC KIDNEY DISEASE (HCC): ICD-10-CM

## 2023-11-29 DIAGNOSIS — I63.9 CEREBRAL INFARCTION, UNSPECIFIED MECHANISM (HCC): ICD-10-CM

## 2023-11-29 DIAGNOSIS — M05.9 RHEUMATOID ARTHRITIS WITH POSITIVE RHEUMATOID FACTOR, INVOLVING UNSPECIFIED SITE (HCC): ICD-10-CM

## 2023-11-29 DIAGNOSIS — D23.30 CYST, DERMOID, FACE: Primary | ICD-10-CM

## 2023-11-29 PROCEDURE — G0008 ADMIN INFLUENZA VIRUS VAC: HCPCS | Performed by: FAMILY MEDICINE

## 2023-11-29 PROCEDURE — 1036F TOBACCO NON-USER: CPT | Performed by: FAMILY MEDICINE

## 2023-11-29 PROCEDURE — 3074F SYST BP LT 130 MM HG: CPT | Performed by: FAMILY MEDICINE

## 2023-11-29 PROCEDURE — 90694 VACC AIIV4 NO PRSRV 0.5ML IM: CPT | Performed by: FAMILY MEDICINE

## 2023-11-29 PROCEDURE — 3078F DIAST BP <80 MM HG: CPT | Performed by: FAMILY MEDICINE

## 2023-11-29 PROCEDURE — 99213 OFFICE O/P EST LOW 20 MIN: CPT | Performed by: FAMILY MEDICINE

## 2023-11-29 PROCEDURE — G8417 CALC BMI ABV UP PARAM F/U: HCPCS | Performed by: FAMILY MEDICINE

## 2023-11-29 PROCEDURE — G8427 DOCREV CUR MEDS BY ELIG CLIN: HCPCS | Performed by: FAMILY MEDICINE

## 2023-11-29 PROCEDURE — G8484 FLU IMMUNIZE NO ADMIN: HCPCS | Performed by: FAMILY MEDICINE

## 2023-11-29 PROCEDURE — 1123F ACP DISCUSS/DSCN MKR DOCD: CPT | Performed by: FAMILY MEDICINE

## 2023-11-29 RX ORDER — HYDROCHLOROTHIAZIDE 12.5 MG/1
12.5 CAPSULE, GELATIN COATED ORAL DAILY
COMMUNITY

## 2023-11-29 RX ORDER — FAMOTIDINE 40 MG/1
40 TABLET, FILM COATED ORAL DAILY
COMMUNITY
Start: 2023-09-01

## 2023-11-29 RX ORDER — ALLOPURINOL 100 MG/1
100 TABLET ORAL DAILY
COMMUNITY
Start: 2023-11-02

## 2023-11-29 NOTE — PROGRESS NOTES
cardiac stents    CARPAL TUNNEL RELEASE      EYE SURGERY  right    retina hole / Phaco with IOL     ID NEUROPLASTY &/TRANSPOS MEDIAN NRV CARPAL TUNNE Right 2018    RIGHT WRIST CARPAL TUNNEL RELEASE performed by Rafael Bacon MD at [address]      2-3 cardiac stents     Family History   Problem Relation Age of Onset    Heart Attack Mother     Cancer Father         LYMPHOMA    Heart Disease Sister         pacemaker     Social History     Socioeconomic History    Marital status:      Spouse name: None    Number of children: None    Years of education: None    Highest education level: None   Tobacco Use    Smoking status: Former     Packs/day: 1.00     Years: 25.00     Additional pack years: 0.00     Total pack years: 25.00     Types: Cigarettes     Quit date: 1992     Years since quittin.8    Smokeless tobacco: Never   Substance and Sexual Activity    Alcohol use: Not Currently     Comment: occasional     Drug use: No     Social Determinants of Health     Financial Resource Strain: Low Risk  (2023)    Overall Financial Resource Strain (CARDIA)     Difficulty of Paying Living Expenses: Not hard at all   Transportation Needs: Unknown (2023)    PRAPARE - Transportation     Lack of Transportation (Non-Medical):  No   Physical Activity: Inactive (2023)    Exercise Vital Sign     Days of Exercise per Week: 0 days     Minutes of Exercise per Session: 0 min   Housing Stability: Unknown (2023)    Housing Stability Vital Sign     Unstable Housing in the Last Year: No     Current Outpatient Medications   Medication Sig Dispense Refill    famotidine (PEPCID) 40 MG tablet Take 1 tablet by mouth daily      allopurinol (ZYLOPRIM) 100 MG tablet Take 1 tablet by mouth daily      hydroCHLOROthiazide (MICROZIDE) 12.5 MG capsule Take 1 capsule by mouth daily      ezetimibe (ZETIA) 10 MG tablet Take 1 tablet by mouth daily 30 tablet 2    finasteride (PROSCAR) 5 MG tablet Take 1 tablet by

## 2023-12-04 ENCOUNTER — APPOINTMENT (OUTPATIENT)
Dept: UROLOGY | Facility: CLINIC | Age: 78
End: 2023-12-04
Payer: MEDICARE

## 2023-12-30 RX ORDER — METHYLPREDNISOLONE 4 MG/1
TABLET ORAL
Qty: 1 KIT | Refills: 0 | Status: SHIPPED | OUTPATIENT
Start: 2023-12-30

## 2023-12-30 RX ORDER — BROMPHENIRAMINE MALEATE, PSEUDOEPHEDRINE HYDROCHLORIDE, AND DEXTROMETHORPHAN HYDROBROMIDE 2; 30; 10 MG/5ML; MG/5ML; MG/5ML
5 SYRUP ORAL 3 TIMES DAILY PRN
Qty: 110 ML | Refills: 0 | Status: SHIPPED | OUTPATIENT
Start: 2023-12-30

## 2024-01-03 ENCOUNTER — TELEPHONE (OUTPATIENT)
Dept: NEUROLOGY | Facility: CLINIC | Age: 79
End: 2024-01-03
Payer: MEDICARE

## 2024-01-03 NOTE — TELEPHONE ENCOUNTER
Pt called for a refill for the Clopregel 75mg 1 tab daily for a 90 day supply at [facility] in [place].

## 2024-01-04 DIAGNOSIS — I63.9 ACUTE CVA (CEREBROVASCULAR ACCIDENT) (MULTI): Primary | ICD-10-CM

## 2024-01-04 RX ORDER — CLOPIDOGREL BISULFATE 75 MG/1
75 TABLET ORAL DAILY
Qty: 90 TABLET | Refills: 1 | Status: SHIPPED | OUTPATIENT
Start: 2024-01-04 | End: 2024-02-08 | Stop reason: SDUPTHER

## 2024-01-18 ENCOUNTER — OFFICE VISIT (OUTPATIENT)
Dept: FAMILY MEDICINE CLINIC | Age: 79
End: 2024-01-18

## 2024-01-18 VITALS
SYSTOLIC BLOOD PRESSURE: 116 MMHG | BODY MASS INDEX: 27.34 KG/M2 | HEART RATE: 64 BPM | DIASTOLIC BLOOD PRESSURE: 64 MMHG | OXYGEN SATURATION: 96 % | HEIGHT: 74 IN | WEIGHT: 213 LBS | TEMPERATURE: 97.6 F

## 2024-01-18 DIAGNOSIS — M47.816 OSTEOARTHRITIS OF LUMBAR SPINE, UNSPECIFIED SPINAL OSTEOARTHRITIS COMPLICATION STATUS: ICD-10-CM

## 2024-01-18 DIAGNOSIS — J44.0 CHRONIC OBSTRUCTIVE PULMONARY DISEASE WITH ACUTE LOWER RESPIRATORY INFECTION (HCC): Primary | ICD-10-CM

## 2024-01-18 RX ORDER — AZITHROMYCIN 250 MG/1
250 TABLET, FILM COATED ORAL SEE ADMIN INSTRUCTIONS
Qty: 6 TABLET | Refills: 0 | Status: SHIPPED | OUTPATIENT
Start: 2024-01-18 | End: 2024-01-23

## 2024-01-18 RX ORDER — METHYLPREDNISOLONE 4 MG/1
TABLET ORAL
Qty: 1 KIT | Refills: 0 | Status: SHIPPED | OUTPATIENT
Start: 2024-01-18 | End: 2024-01-24

## 2024-01-18 ASSESSMENT — PATIENT HEALTH QUESTIONNAIRE - PHQ9
1. LITTLE INTEREST OR PLEASURE IN DOING THINGS: 0
SUM OF ALL RESPONSES TO PHQ QUESTIONS 1-9: 0
2. FEELING DOWN, DEPRESSED OR HOPELESS: 0
SUM OF ALL RESPONSES TO PHQ QUESTIONS 1-9: 0
SUM OF ALL RESPONSES TO PHQ9 QUESTIONS 1 & 2: 0
SUM OF ALL RESPONSES TO PHQ QUESTIONS 1-9: 0
SUM OF ALL RESPONSES TO PHQ QUESTIONS 1-9: 0

## 2024-01-18 ASSESSMENT — ENCOUNTER SYMPTOMS
COUGH: 1
SHORTNESS OF BREATH: 1
BACK PAIN: 1
CHEST TIGHTNESS: 1

## 2024-01-18 NOTE — PROGRESS NOTES
Subjective  Parker Polanco, 78 y.o. male presents today with:  Chief Complaint   Patient presents with    Cough     Patient presents today c/o cough, chest congestion, and nasal congestion. X 1 month.        HPI    Patient is here being seen acutely with complaint of cough productive of clear to green mucus chest congestion and wheezing for the past month  History of COPD treated with oral steroid and cough syrup 3 weeks ago with minimal improvement. Has nebulizer-  inconsistent with use  Also complaining of low back and hip pain for scheduled follow-up with Julio  neurosurgeon  Review of Systems   Respiratory:  Positive for cough, chest tightness and shortness of breath.    Musculoskeletal:  Positive for arthralgias and back pain.   All other systems reviewed and are negative.        Past Medical History:   Diagnosis Date    Arthritis     R/A    CAD (coronary artery disease)     cardiac stents x 3    Chronic back pain     COPD (chronic obstructive pulmonary disease) ([facility])     past smoker > 25 yr habit / quit 25 yrs ago    COPD (chronic obstructive pulmonary disease) ([facility])     Coronary artery disease     Hemorrhoids     Hyperlipidemia     past trx / cannot tolerate statins    Hypertension     meds > 10 yrs / denies TIA or stroke    Neuropathy     PMR (polymyalgia rheumatica) ([facility])     Restless leg syndrome      Past Surgical History:   Procedure Laterality Date    ARM SURGERY Left 2/20/2023    LEFT ELBOW INCISION AND DRAINAGE, DEBRIDEMENT LEFT OLECRANON performed by Amos Ortiz MD at [facility] [place]    CARDIAC SURGERY      2-3 cardiac stents    CARPAL TUNNEL RELEASE      EYE SURGERY  right    retina hole / Phaco with IOL     DE NEUROPLASTY &/TRANSPOS MEDIAN NRV CARPAL TUNNE Right 1/11/2018    RIGHT WRIST CARPAL TUNNEL RELEASE performed by Alan Gregorio MD at [facility] [place]    PTCA      2-3 cardiac stents     Social History     Socioeconomic History    Marital status:      Spouse name: Not on file    Number

## 2024-01-23 ENCOUNTER — HOSPITAL ENCOUNTER (OUTPATIENT)
Dept: RADIOLOGY | Facility: CLINIC | Age: 79
Discharge: HOME | End: 2024-01-23
Payer: MEDICARE

## 2024-01-23 ENCOUNTER — OFFICE VISIT (OUTPATIENT)
Dept: ORTHOPEDIC SURGERY | Facility: CLINIC | Age: 79
End: 2024-01-23
Payer: MEDICARE

## 2024-01-23 VITALS — BODY MASS INDEX: 27.59 KG/M2 | HEIGHT: 74 IN | WEIGHT: 215 LBS

## 2024-01-23 DIAGNOSIS — M54.16 LUMBAR RADICULOPATHY: ICD-10-CM

## 2024-01-23 DIAGNOSIS — M54.50 LOW BACK PAIN, UNSPECIFIED BACK PAIN LATERALITY, UNSPECIFIED CHRONICITY, UNSPECIFIED WHETHER SCIATICA PRESENT: ICD-10-CM

## 2024-01-23 PROCEDURE — 72110 X-RAY EXAM L-2 SPINE 4/>VWS: CPT | Performed by: ORTHOPAEDIC SURGERY

## 2024-01-23 PROCEDURE — 99214 OFFICE O/P EST MOD 30 MIN: CPT | Performed by: ORTHOPAEDIC SURGERY

## 2024-01-23 PROCEDURE — 1036F TOBACCO NON-USER: CPT | Performed by: ORTHOPAEDIC SURGERY

## 2024-01-23 PROCEDURE — 1125F AMNT PAIN NOTED PAIN PRSNT: CPT | Performed by: ORTHOPAEDIC SURGERY

## 2024-01-23 PROCEDURE — 72120 X-RAY BEND ONLY L-S SPINE: CPT

## 2024-01-23 ASSESSMENT — ENCOUNTER SYMPTOMS
OCCASIONAL FEELINGS OF UNSTEADINESS: 1
DEPRESSION: 0
LOSS OF SENSATION IN FEET: 1

## 2024-01-23 ASSESSMENT — PAIN - FUNCTIONAL ASSESSMENT: PAIN_FUNCTIONAL_ASSESSMENT: 0-10

## 2024-01-23 ASSESSMENT — PAIN SCALES - GENERAL: PAINLEVEL_OUTOF10: 7

## 2024-01-23 NOTE — PROGRESS NOTES
Chief complaint: Back pain left leg radiculopathy    HPI: Status post an L2-S1 midline laminectomy by me back in 2021.  His leg symptoms are doing good up until about 6 to 7 months ago.  He now has left low back pain that radiates through his buttock lateral thigh and anterior thigh down to the knee.  Nothing on the right side.  It is extremely painful to him at times.  Sometimes he gets it when he is laying down and sometimes he gets it when he is walking.  There is no fevers chills nausea vomiting.  There is no bowel or bladder changes.  He had no treatment for this at all.  He has not had any recent physical therapy or chiropractic.    Physical exam: Well-nourished, well kept.  Good perfusion to the extremities ×4.  Radial and dorsalis pedis pulses 2+.  Capillary refill to all 4 digits brisk.  No distal edema x 4.  No lymphangitis or lymphadenopathy in the examined extremities.  Gait normal.  Can walk on heels and toes.  Examination of the neck reveals no swelling, step-off, or point tenderness.  Range of motion with flexion, extension, side bending and rotation is well maintained without crepitance, instability, or exacerbation of pain.  Strength is within normal limits.  Thoracic spine is nontender.  Examination of the back shows tenderness in the paraspinous musculature.  There is decreased range of motion in all directions due to guarding/muscle spasms and pain at extremes.  There is good strength and no instability.  Examination of the lower extremities reveals no point tenderness, swelling, or deformity.  Range of motion of the hips, knees, and ankles are full without crepitance, instability, or exacerbation of pain.  Strength is 5/5 throughout.  No redness, abrasions, or lesions on all 4 extremities, head and neck, or trunk.  Gross sensation intact in the extremities ×4.  Deep tendon reflexes 2+ and symmetric bilaterally.  Esther, clonus, and Babinski were negative.  Straight leg raise negative.  Affect  normal.  Alert and oriented ×3.  Coordination normal.    X-rays show a spondylolisthesis at L4-5 which is dynamic.  There is no spondylolysis.  He has a midline laminectomy from L2 down through S1.  His hips look fine on x-rays.    Assessment/plan: Back pain buttock pain lateral thigh pain anterior thigh pain down to the knee.  When I range his left hip I do not reproduce much of his pain.  He has a little tenderness over the left hip bursa but I do not think this is his problem.  I think this is a radiculopathy.  He is already had the laminectomy by me in 2021 from L2-S1.  Regarding get an MRI with and without gadolinium of his lumbar spine and get him into some physical therapy.  Will see him back after the MRI.  This patient has a severe threat inhibition of bodily function.  We have ordered and reviewed x-rays on him today.  We did review notes in the computer that shows he was in the ER for TIA-like symptoms.  Will need to keep that in mind if we do decide to proceed on with more surgery on him.

## 2024-01-24 DIAGNOSIS — I10 PRIMARY HYPERTENSION: ICD-10-CM

## 2024-01-24 RX ORDER — METOPROLOL SUCCINATE 25 MG/1
25 TABLET, EXTENDED RELEASE ORAL DAILY
Qty: 90 TABLET | Refills: 3 | Status: SHIPPED | OUTPATIENT
Start: 2024-01-24 | End: 2024-02-08 | Stop reason: SDUPTHER

## 2024-01-30 ENCOUNTER — TELEMEDICINE (OUTPATIENT)
Dept: FAMILY MEDICINE CLINIC | Age: 79
End: 2024-01-30
Payer: MEDICARE

## 2024-01-30 ENCOUNTER — HOSPITAL ENCOUNTER (OUTPATIENT)
Dept: RADIOLOGY | Facility: HOSPITAL | Age: 79
Discharge: HOME | End: 2024-01-30
Payer: MEDICARE

## 2024-01-30 VITALS — WEIGHT: 212 LBS | BODY MASS INDEX: 27.21 KG/M2 | HEIGHT: 74 IN

## 2024-01-30 DIAGNOSIS — I10 PRIMARY HYPERTENSION: Primary | ICD-10-CM

## 2024-01-30 DIAGNOSIS — Z00.00 MEDICARE ANNUAL WELLNESS VISIT, SUBSEQUENT: Primary | ICD-10-CM

## 2024-01-30 DIAGNOSIS — M54.16 LUMBAR RADICULOPATHY: ICD-10-CM

## 2024-01-30 DIAGNOSIS — M54.50 LOW BACK PAIN, UNSPECIFIED BACK PAIN LATERALITY, UNSPECIFIED CHRONICITY, UNSPECIFIED WHETHER SCIATICA PRESENT: ICD-10-CM

## 2024-01-30 PROCEDURE — 1123F ACP DISCUSS/DSCN MKR DOCD: CPT | Performed by: NURSE PRACTITIONER

## 2024-01-30 PROCEDURE — G8484 FLU IMMUNIZE NO ADMIN: HCPCS | Performed by: NURSE PRACTITIONER

## 2024-01-30 PROCEDURE — 72158 MRI LUMBAR SPINE W/O & W/DYE: CPT

## 2024-01-30 PROCEDURE — A9575 INJ GADOTERATE MEGLUMI 0.1ML: HCPCS | Performed by: ORTHOPAEDIC SURGERY

## 2024-01-30 PROCEDURE — G0439 PPPS, SUBSEQ VISIT: HCPCS | Performed by: NURSE PRACTITIONER

## 2024-01-30 PROCEDURE — 2550000001 HC RX 255 CONTRASTS: Performed by: ORTHOPAEDIC SURGERY

## 2024-01-30 RX ORDER — GADOTERATE MEGLUMINE 376.9 MG/ML
19 INJECTION INTRAVENOUS
Status: COMPLETED | OUTPATIENT
Start: 2024-01-30 | End: 2024-01-30

## 2024-01-30 RX ORDER — LISINOPRIL 5 MG/1
5 TABLET ORAL DAILY
COMMUNITY
End: 2024-01-30 | Stop reason: SDUPTHER

## 2024-01-30 RX ORDER — LISINOPRIL 5 MG/1
5 TABLET ORAL DAILY
Qty: 90 TABLET | Refills: 3 | Status: SHIPPED | OUTPATIENT
Start: 2024-01-30 | End: 2024-01-30 | Stop reason: SDUPTHER

## 2024-01-30 RX ADMIN — GADOTERATE MEGLUMINE 19 ML: 376.9 INJECTION INTRAVENOUS at 21:58

## 2024-01-30 ASSESSMENT — PATIENT HEALTH QUESTIONNAIRE - PHQ9
SUM OF ALL RESPONSES TO PHQ QUESTIONS 1-9: 0
1. LITTLE INTEREST OR PLEASURE IN DOING THINGS: 0
SUM OF ALL RESPONSES TO PHQ9 QUESTIONS 1 & 2: 0
2. FEELING DOWN, DEPRESSED OR HOPELESS: 0
SUM OF ALL RESPONSES TO PHQ QUESTIONS 1-9: 0

## 2024-01-30 ASSESSMENT — LIFESTYLE VARIABLES
HOW MANY STANDARD DRINKS CONTAINING ALCOHOL DO YOU HAVE ON A TYPICAL DAY: 1 OR 2
HOW OFTEN DO YOU HAVE A DRINK CONTAINING ALCOHOL: MONTHLY OR LESS

## 2024-01-30 NOTE — PROGRESS NOTES
Medicare Annual Wellness Visit    Parker Polanco is here for Medicare AWV    Assessment & Plan   Medicare annual wellness visit, subsequent    Recommendations for Preventive Services Due: see orders and patient instructions/AVS.  Recommended screening schedule for the next 5-10 years is provided to the patient in written form: see Patient Instructions/AVS.     Return in 1 year (on 1/30/2025).     Subjective       Patient's complete Health Risk Assessment and screening values have been reviewed and are found in Flowsheets. The following problems were reviewed today and where indicated follow up appointments were made and/or referrals ordered.    Positive Risk Factor Screenings with Interventions:       Cognitive:      Words recalled: 1 Word Recalled     Total Score Interpretation: Abnormal Mini-Cog  Interventions:  Patient declines any further evaluation or treatment           General HRA Questions:  Select all that apply: (!) New or Increased Pain    Pain Interventions:  Patient advised to follow up in the office for further evaluation and treatment      Activity, Diet, and Weight:  On average, how many days per week do you engage in moderate to strenuous exercise (like a brisk walk)?: 0 days  On average, how many minutes do you engage in exercise at this level?: 0 min    Do you eat balanced/healthy meals regularly?: Yes    There is no height or weight on file to calculate BMI.      Inactivity Interventions:  Patient advised to follow up in the office for further evaluation and treatment                           Objective      Patient-Reported Vitals  No data recorded          Allergies   Allergen Reactions    Seasonal      Sinus congestion    Statins      MYALGIAS     Prior to Visit Medications    Medication Sig Taking? Authorizing Provider   methylPREDNISolone (MEDROL DOSEPACK) 4 MG tablet Take by mouth. Yes Chriss Duenas, DO   famotidine (PEPCID) 40 MG tablet Take 1 tablet by mouth daily Yes Provider,

## 2024-01-30 NOTE — PATIENT INSTRUCTIONS
Learning About Being Active as an Older Adult  Why is being active important as you get older?     Being active is one of the best things you can do for your health. And it's never too late to start. Being active--or getting active, if you aren't already--has definite benefits. It can:  Give you more energy,  Keep your mind sharp.  Improve balance to reduce your risk of falls.  Help you manage chronic illness with fewer medicines.  No matter how old you are, how fit you are, or what health problems you have, there is a form of activity that will work for you. And the more physical activity you can do, the better your overall health will be.  What kinds of activity can help you stay healthy?  Being more active will make your daily activities easier. Physical activity includes planned exercise and things you do in daily life. There are four types of activity:  Aerobic.  Doing aerobic activity makes your heart and lungs strong.  Includes walking, dancing, and gardening.  Aim for at least 2½ hours spread throughout the week.  It improves your energy and can help you sleep better.  Muscle-strengthening.  This type of activity can help maintain muscle and strengthen bones.  Includes climbing stairs, using resistance bands, and lifting or carrying heavy loads.  Aim for at least twice a week.  It can help protect the knees and other joints.  Stretching.  Stretching gives you better range of motion in joints and muscles.  Includes upper arm stretches, calf stretches, and gentle yoga.  Aim for at least twice a week, preferably after your muscles are warmed up from other activities.  It can help you function better in daily life.  Balancing.  This helps you stay coordinated and have good posture.  Includes heel-to-toe walking, pedro luis chi, and certain types of yoga.  Aim for at least 3 days a week.  It can reduce your risk of falling.  Even if you have a hard time meeting the recommendations, it's better to be more active

## 2024-02-01 RX ORDER — LISINOPRIL 5 MG/1
5 TABLET ORAL DAILY
Qty: 100 TABLET | Refills: 3 | Status: SHIPPED | OUTPATIENT
Start: 2024-02-01 | End: 2024-02-08 | Stop reason: SDUPTHER

## 2024-02-02 ENCOUNTER — OFFICE VISIT (OUTPATIENT)
Dept: UROLOGY | Facility: CLINIC | Age: 79
End: 2024-02-02
Payer: MEDICARE

## 2024-02-02 VITALS
SYSTOLIC BLOOD PRESSURE: 160 MMHG | HEART RATE: 67 BPM | DIASTOLIC BLOOD PRESSURE: 85 MMHG | RESPIRATION RATE: 16 BRPM | WEIGHT: 211.4 LBS | HEIGHT: 74 IN | BODY MASS INDEX: 27.13 KG/M2

## 2024-02-02 DIAGNOSIS — R97.20 HIGH PROSTATE SPECIFIC ANTIGEN (PSA): ICD-10-CM

## 2024-02-02 DIAGNOSIS — R82.89 ABNORMAL URINE CYTOLOGY: Primary | ICD-10-CM

## 2024-02-02 DIAGNOSIS — R35.1 NOCTURIA: ICD-10-CM

## 2024-02-02 PROCEDURE — 1126F AMNT PAIN NOTED NONE PRSNT: CPT | Performed by: UROLOGY

## 2024-02-02 PROCEDURE — 51798 US URINE CAPACITY MEASURE: CPT | Performed by: UROLOGY

## 2024-02-02 PROCEDURE — 3079F DIAST BP 80-89 MM HG: CPT | Performed by: UROLOGY

## 2024-02-02 PROCEDURE — 1160F RVW MEDS BY RX/DR IN RCRD: CPT | Performed by: UROLOGY

## 2024-02-02 PROCEDURE — 3077F SYST BP >= 140 MM HG: CPT | Performed by: UROLOGY

## 2024-02-02 PROCEDURE — 1036F TOBACCO NON-USER: CPT | Performed by: UROLOGY

## 2024-02-02 PROCEDURE — 51741 ELECTRO-UROFLOWMETRY FIRST: CPT | Performed by: UROLOGY

## 2024-02-02 PROCEDURE — 99213 OFFICE O/P EST LOW 20 MIN: CPT | Performed by: UROLOGY

## 2024-02-02 PROCEDURE — 1159F MED LIST DOCD IN RCRD: CPT | Performed by: UROLOGY

## 2024-02-02 ASSESSMENT — ENCOUNTER SYMPTOMS
HEMATURIA: 0
DIFFICULTY URINATING: 1
FREQUENCY: 1
DYSURIA: 0

## 2024-02-02 ASSESSMENT — PAIN SCALES - GENERAL: PAINLEVEL: 0-NO PAIN

## 2024-02-02 NOTE — PATIENT INSTRUCTIONS
Patient Discussion/Summary     It was good to see you once again.  Once adding Proscar to your regimen in addition to Flomax, you have had a significant increase in your flow and dramatic decrease in your residuals.  Fortunately, you discontinued the Proscar since you are not noticing enlargement of the breast.  Your residuals are back up and now 171 cc.  Your PSA, however elevated to 11.10, is significantly lower than your PSA in 2019 of 35.5 when you had a prostate biopsy which was negative. We will see you again in June      This note was created with voice-recognition software and was not corrected for typographical or grammatical errors

## 2024-02-02 NOTE — LETTER
February 2, 2024     Jah Wilks MD  [address] [facility] [address]    Patient: Russ Lagos   YOB: 1945   Date of Visit: 2/2/2024       Dear Dr. Jah Wilks MD:    Thank you for referring Russ Lagos to me for evaluation. Below are my notes for this consultation.  If you have questions, please do not hesitate to call me. I look forward to following your patient along with you.       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 No family history of prostate or breast cancer. 20-pack-year cigarette smoking history.     June 2000, PSA 3.7, TRUS biopsy (6) BPH     May of 2004, PSA 6.6, biopsy (8), BPH     August 2007, PSA 4.4, biopsy (10), BPH     01/12/12, PSA 9.1, biopsy (12) revealed ASAP     May 9 2012, biopsy (14) revealed BPH.     06/24/14 patient presented with significant ORCHALGIA.     12/05/14, PSA was 8.0, last PSA in his last biopsy was done at 9.1. Urine cytologies again ATYPICAL. Scrotal ultrasound is negative.     06/08/15, PSA 11.2. Patient states that he has had some recent discomfort which may be related to prostatitis or even epididymitis. 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 We will have a trial of Cipro for 2 weeks and repeat PSA.     10/19/15, patient had an outside PSA which was reportedly ELEVATED. We have repeated it and it is 7.7 in our laboratory. This is clearly within range of his 2012 biopsy which revealed BPH, when the PSA was 9.1. Of note, the patient is stating that he has URINARY FREQUENCY every hour, NOCTURIA Ã--3, and URGENCY. He states that these are new symptoms over the last 4-6 months. I have offered him a cystoscopy with urodynamic studies. He would  like to review our brochures and discuss at our next appointment.     12/29/15, the patient states that his frequency and urgency have abated. He only has NOCTURIA Ã--1 at this point. He refuses evaluation with cystoscopy and urodynamics. PSA is 5.5 which is well within range of his last biopsy. Cytology is negative. Testicular ultrasound shows a right epididymal head complex cyst which is most probably benign. He will return in June 2016 for his annual visit.     06/24/16 patient without any complaints. PSA 7.0, within limits of his 01/12/12 biopsy when the PSA was 9.1. Cytology negative. Testicular ultrasound once again shows a septated right EPIDIDYMAL HEAD CYST without change. He will return in 6 months with a PSA.     12/21/16, PSA 7.4 which is within range of his 01/12/12 biopsy when the PSA was 9.1. Once again, he is complaining of URGENCY AND FREQUENCY but is not interested in a cystoscopy with urodynamics he is also complaining of foul-smelling urine. We will obtain a urinalysis and urine culture and I will prescribe a 5 day course of Macrobid. When he returns in June, we will order a flow rate and PVR in addition to his PSA, cytology and testicular ultrasound.     06/06/17, PSA is 11.0 which is higher than the PSA of 9.1 when he had a biopsy on 01/12/12 which was benign. We will try 2 weeks course of antibiotics and retake the PSA. If it is above 10.1, we will recommend a TRUS biopsy (14). Patient no longer is complaining of urgency or frequency. Testicular ultrasound shows stable right COMPLEX EPIDIDYMAL CYST. Urine cytology is negative.     12/15/17, PSA is 25.6, which is higher than the PSA of 9.1 when he had his last biopsy on 01/12/12, which proved to be benign. 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 We will try Cipro for 2 weeks and repeat the PSA and make a decision at that point. Clearly if it is the same or higher we  will proceed with another biopsy, TRUS biopsy (14). However if it decreases we will either continue the Cipro is a lower dose and repeat PSA or wait until June of next year.     01/07/18, following 2 weeks of Cipro, PSA decreased to 17.2     02/02/18 following 2 more weeks of Cipro, PSA down to 11.4.     06/06/18, patient is complaining of generalized FATIGUE AND WEAKNESS. His blood pressure is now at 100 for systolic which is quite low for him. He will see Dr. Laboy, his cardiologist tomorrow to reevaluate. In addition, his PSA once again is elevated to 26.2. 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 We will give him a trial of Cipro at the 500 mg dose for 2 weeks and repeat Cipro and repeat the PSA. If it is still elevated, I would recommend an MRI guided prostate biopsy with Dr. Arriaza. Testicular ultrasound shows no change and his urine cytology was negative.     07/25/18, patient saw Dr. Arriaza, MRI was ordered: Negative for abnormalities. Therefore patient will return to my care.     12/05/18, PSA is 22.5. His within range of his PSAs over the past year. His MRI was negative for months ago. We will have another PSA in 6 months and I will obtain an [employer] urine test a month prior to his visit. He is now complaining of some urgency, frequency and nocturia. We will discuss a cystoscopy with urodynamic studies at his next visit.     06/07/19, PSA is now 35.50. Exos[employer] urine test is 28.86 which is a high risk for high grade prostate cancer. Cytology is negative. Culture is negative. He will be scheduled for a TRUS biopsy, he wishes to have this performed under anesthesia. This is been scheduled for 06/13/19 and Dr. Laboy will provide preoperative cardiac risk stratification.     06/13/19, OR, TRUS biopsy. PSA 35.50 volume 153.0 cc. Pathology: BPH.     06/19/19, telephone conversation, preoperative chest x-ray showed a nodule     07/08/19, telephone conversation, CAT  scan of the chest of the right lung nodules are benign.     12/14/19, patient arrives alone. He states that he is now experiencing URINARY FREQUENCY, URGENCY, AND INCOMPLETE EMPTYING. He does have a 153.0 g prostate on ultrasound. We will pursue a cystoscopy with urodynamic studies. He may benefit from an alpha agent, with consideration to hypotension, a 5 alpha reductase inhibitor or both. His most recent PSA is 24.50 which is significantly less then his PSA of 35.50 on 06/13/19 where he underwent a negative biopsy.     10/26/20, cystoscopy today reveals a severely obstructed prostatic urethra and a severely elevated median lobe. Multiple trabeculations and small diverticulum within the bladder. Flow rate of 3 cc/s with a total volume of 23 cc. PVR 15 cc's. Patient states that his urinary frequency in the daytime is the major issue urinating approximately every hour. His nocturia is vacillating between times one to Ã--5. I will initiate Flomax 0.4 mg daily. He will return in 3 months and we will consider adding Proscar if necessary.     02/03/21, patient arrives alone. He states that since starting Flomax, his urinary frequency has changed from every hour during the daytime to every 2-1/2 hours. Nocturia is now down from Ã--5 to Ã--1. He did not have much urine today, only 28 cc. Therefore his flow rate was 3 cc with a PVR of 80 cc. PSA is 13.5 which is dramatically lower then his PSA of 35.50 on 06/13/19 when he underwent a negative prostate biopsy. Urine cytologies lacking atypia. Testicular ultrasound shows stable bilateral epididymal cysts. We will no longer proceed with ultrasounds. He does state that he notices an odor to his urine and we will now start checking his UA and culture. If he starts having more urinary symptoms we can add a 5 alpha reductase inhibitor in the future.     June 4, 2021, patient arrives alone. He states that Flomax is working with decreased urgency and frequency and nocturia has  decreased to x1. He states that this is associated with fluid intake and activity. Flow rate of 8 cc/s, total volume 41 cc and PVR 37 cc. PSA is 15.90 which is lower than his PSA of 35.50 on June 13, 2019 when he underwent a negative prostate biopsy. Urinalysis and urine culture were negative. Urine cytology was not performed due to laboratory indiscretion. He will return in 1 year.     August 22, 2021, OR, Dr. Brett Norton, lumbar surgery     December 21, 2021, patient calls complaining of hesitancy and incomplete emptying. Believes he has a UTI. Macrobid ordered. UA and CNS were negative.     June 8, 2022, patient arrives alone. He states that his urinary urgency and frequency has returned. He states his nocturia x3 has returned. He continues on daily Flomax and I will now add Proscar to his regimen. He will return in 6 months. If he still has the same symptoms, we will have him see Dr. Jon George in consultation for minimally invasive prostate surgery. Today's flow rate 7 cc/s, total volume 71 cc, no PVR due to mechanical failure. PSA is 17.49 which is lower than his PSA of 35.50 on June 13, 2019 when he underwent a negative prostate biopsy. Urinalysis shows 1 red blood cell. Urine culture and urine cytology were ordered but not performed.     June 27, 2022, telephone call, patient complains of bladder pain and pressure. We obtained a urinalysis and urine culture which was negative.     August 3, 2022, telephone call, patient states symptoms have improved and he no longer has any issues. He continues to take Flomax and Proscar.     January 17, 2023, patient arrives alone. He has lost 30 pounds as a side effect of one of his medications from pain management. He is discontinued that medicine and has stabilized. Dr. Laboy his cardiologist is manipulating his antihypertensive medication and he does occasionally have dizziness. He has been taking Flomax for over 3 years without side effect. It is most probably  secondary to his weight loss and other medications. Today, now that he is on both Flomax and Proscar, his parameters are excellent. Flow rate of 10 cc/s, total volume 105 cc, PVR 17 cc. Urinalysis is negative for blood. Renal colic CAT scan does not identify any stones in the urinary tract. He will return in 6 months     July 28, 2023, patient arrives alone. He continues on Flomax and Proscar with excellent results. Flow rate of 12 cc/s with a PVR of 7 cc. Urinalysis is negative for blood, urine culture no growth, urine cytology is lacking atypia. Corrected PSA is 13.28 which is significantly lower than his PSA of 35.50 on June 13, 2019 when he underwent a negative prostate biopsy. He will return in December.    August 8, 2023, patient admitted to [facility] for transient aphasia    December 2023, patient discontinued Proscar secondary to gynecomastia.    PLAVIX    February 2, 2024, patient arrives alone.  He continues on Flomax alone with a flow rate 10 cc/s, total volume 212 cc,  cc.  He is now developed CKD stage III.  Corrected PSA is 11.10 which is significantly lower than the PSA 35.50 on June 13, 2019 when he underwent a negative prostate biopsy.  He will return in June.     PLAN:     #1 patient will return in December 2024 with a flow rate and PVR     Also in June 2024 with a PSA, urine studies, flow rate and PVR.      #2 continue Flomax 0.4 mg by mouth daily     Physical Exam  Vitals and nursing note reviewed.   Constitutional:       Appearance: Normal appearance.   HENT:      Head: Normocephalic and atraumatic.   Pulmonary:      Effort: Pulmonary effort is normal.   Abdominal:      Palpations: Abdomen is soft.      Tenderness: There is no abdominal tenderness.   Musculoskeletal:         General: Normal range of motion.      Cervical back: Normal range of motion and neck supple.   Neurological:      General: No focal deficit present.      Mental Status: He is alert and oriented to person, place, and  time.   Psychiatric:         Mood and Affect: Mood normal.         Behavior: Behavior normal.         This note was created with voice-recognition software and was not corrected for typographical or grammatical errors.

## 2024-02-02 NOTE — PROGRESS NOTES
"Patient denies any pain today. Patient was admitted to [facility] from 8/8/23-8/9/23 for transient aphasia. Patient denies any concerns about falling or safety. Patient has concerns for incomplete emptying or intermittency. Patient taking Flomax as directed, stopped taking Proscar 12/2023 due to \"breast enlargement\". CV     Review of Systems   Genitourinary:  Positive for difficulty urinating, enuresis, frequency and urgency. Negative for decreased urine volume, dysuria and hematuria.   All other systems reviewed and are negative.      "

## 2024-02-02 NOTE — PROGRESS NOTES
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 No family history of prostate or breast cancer. 20-pack-year cigarette smoking history.     June 2000, PSA 3.7, TRUS biopsy (6) BPH     May of 2004, PSA 6.6, biopsy (8), BPH     August 2007, PSA 4.4, biopsy (10), BPH     01/12/12, PSA 9.1, biopsy (12) revealed ASAP     May 9 2012, biopsy (14) revealed BPH.     06/24/14 patient presented with significant ORCHALGIA.     12/05/14, PSA was 8.0, last PSA in his last biopsy was done at 9.1. Urine cytologies again ATYPICAL. Scrotal ultrasound is negative.     06/08/15, PSA 11.2. Patient states that he has had some recent discomfort which may be related to prostatitis or even epididymitis. 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 We will have a trial of Cipro for 2 weeks and repeat PSA.     10/19/15, patient had an outside PSA which was reportedly ELEVATED. We have repeated it and it is 7.7 in our laboratory. This is clearly within range of his 2012 biopsy which revealed BPH, when the PSA was 9.1. Of note, the patient is stating that he has URINARY FREQUENCY every hour, NOCTURIA Ã--3, and URGENCY. He states that these are new symptoms over the last 4-6 months. I have offered him a cystoscopy with urodynamic studies. He would like to review our brochures and discuss at our next appointment.     12/29/15, the patient states that his frequency and urgency have abated. He only has NOCTURIA Ã--1 at this point. He refuses evaluation with cystoscopy and urodynamics. PSA is 5.5 which is well within range of his last biopsy. Cytology is negative. Testicular ultrasound shows a right epididymal head complex cyst which is most probably benign. He will return in June 2016 for his annual visit.     06/24/16 patient without any complaints. PSA 7.0, within limits of his 01/12/12 biopsy when the PSA was 9.1. Cytology negative. Testicular ultrasound once  again shows a septated right EPIDIDYMAL HEAD CYST without change. He will return in 6 months with a PSA.     12/21/16, PSA 7.4 which is within range of his 01/12/12 biopsy when the PSA was 9.1. Once again, he is complaining of URGENCY AND FREQUENCY but is not interested in a cystoscopy with urodynamics he is also complaining of foul-smelling urine. We will obtain a urinalysis and urine culture and I will prescribe a 5 day course of Macrobid. When he returns in June, we will order a flow rate and PVR in addition to his PSA, cytology and testicular ultrasound.     06/06/17, PSA is 11.0 which is higher than the PSA of 9.1 when he had a biopsy on 01/12/12 which was benign. We will try 2 weeks course of antibiotics and retake the PSA. If it is above 10.1, we will recommend a TRUS biopsy (14). Patient no longer is complaining of urgency or frequency. Testicular ultrasound shows stable right COMPLEX EPIDIDYMAL CYST. Urine cytology is negative.     12/15/17, PSA is 25.6, which is higher than the PSA of 9.1 when he had his last biopsy on 01/12/12, which proved to be benign. 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 We will try Cipro for 2 weeks and repeat the PSA and make a decision at that point. Clearly if it is the same or higher we will proceed with another biopsy, TRUS biopsy (14). However if it decreases we will either continue the Cipro is a lower dose and repeat PSA or wait until June of next year.     01/07/18, following 2 weeks of Cipro, PSA decreased to 17.2     02/02/18 following 2 more weeks of Cipro, PSA down to 11.4.     06/06/18, patient is complaining of generalized FATIGUE AND WEAKNESS. His blood pressure is now at 100 for systolic which is quite low for him. He will see Dr. Laboy, his cardiologist tomorrow to reevaluate. In addition, his PSA once again is elevated to 26.2. 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 We will give him a trial of Cipro at the 500 mg dose for 2 weeks and repeat Cipro and repeat the PSA. If it is still elevated, I would recommend an MRI guided prostate biopsy with Dr. Arriaza. Testicular ultrasound shows no change and his urine cytology was negative.     07/25/18, patient saw Dr. Arriaza, MRI was ordered: Negative for abnormalities. Therefore patient will return to my care.     12/05/18, PSA is 22.5. His within range of his PSAs over the past year. His MRI was negative for months ago. We will have another PSA in 6 months and I will obtain an [employer] urine test a month prior to his visit. He is now complaining of some urgency, frequency and nocturia. We will discuss a cystoscopy with urodynamic studies at his next visit.     06/07/19, PSA is now 35.50. [facility] urine test is 28.86 which is a high risk for high grade prostate cancer. Cytology is negative. Culture is negative. He will be scheduled for a TRUS biopsy, he wishes to have this performed under anesthesia. This is been scheduled for 06/13/19 and Dr. Laboy will provide preoperative cardiac risk stratification.     06/13/19, OR, TRUS biopsy. PSA 35.50 volume 153.0 cc. Pathology: BPH.     06/19/19, telephone conversation, preoperative chest x-ray showed a nodule     07/08/19, telephone conversation, CAT scan of the chest of the right lung nodules are benign.     12/14/19, patient arrives alone. He states that he is now experiencing URINARY FREQUENCY, URGENCY, AND INCOMPLETE EMPTYING. He does have a 153.0 g prostate on ultrasound. We will pursue a cystoscopy with urodynamic studies. He may benefit from an alpha agent, with consideration to hypotension, a 5 alpha reductase inhibitor or both. His most recent PSA is 24.50 which is significantly less then his PSA of 35.50 on 06/13/19 where he underwent a negative biopsy.     10/26/20, cystoscopy today reveals a severely obstructed prostatic urethra and a severely elevated  median lobe. Multiple trabeculations and small diverticulum within the bladder. Flow rate of 3 cc/s with a total volume of 23 cc. PVR 15 cc's. Patient states that his urinary frequency in the daytime is the major issue urinating approximately every hour. His nocturia is vacillating between times one to Ã--5. I will initiate Flomax 0.4 mg daily. He will return in 3 months and we will consider adding Proscar if necessary.     02/03/21, patient arrives alone. He states that since starting Flomax, his urinary frequency has changed from every hour during the daytime to every 2-1/2 hours. Nocturia is now down from Ã--5 to Ã--1. He did not have much urine today, only 28 cc. Therefore his flow rate was 3 cc with a PVR of 80 cc. PSA is 13.5 which is dramatically lower then his PSA of 35.50 on 06/13/19 when he underwent a negative prostate biopsy. Urine cytologies lacking atypia. Testicular ultrasound shows stable bilateral epididymal cysts. We will no longer proceed with ultrasounds. He does state that he notices an odor to his urine and we will now start checking his UA and culture. If he starts having more urinary symptoms we can add a 5 alpha reductase inhibitor in the future.     June 4, 2021, patient arrives alone. He states that Flomax is working with decreased urgency and frequency and nocturia has decreased to x1. He states that this is associated with fluid intake and activity. Flow rate of 8 cc/s, total volume 41 cc and PVR 37 cc. PSA is 15.90 which is lower than his PSA of 35.50 on June 13, 2019 when he underwent a negative prostate biopsy. Urinalysis and urine culture were negative. Urine cytology was not performed due to laboratory indiscretion. He will return in 1 year.     August 22, 2021, OR, Dr. Brett Norton, lumbar surgery     December 21, 2021, patient calls complaining of hesitancy and incomplete emptying. Believes he has a UTI. Macrobid ordered. UA and CNS were negative.     June 8, 2022, patient  arrives alone. He states that his urinary urgency and frequency has returned. He states his nocturia x3 has returned. He continues on daily Flomax and I will now add Proscar to his regimen. He will return in 6 months. If he still has the same symptoms, we will have him see Dr. Jon George in consultation for minimally invasive prostate surgery. Today's flow rate 7 cc/s, total volume 71 cc, no PVR due to mechanical failure. PSA is 17.49 which is lower than his PSA of 35.50 on June 13, 2019 when he underwent a negative prostate biopsy. Urinalysis shows 1 red blood cell. Urine culture and urine cytology were ordered but not performed.     June 27, 2022, telephone call, patient complains of bladder pain and pressure. We obtained a urinalysis and urine culture which was negative.     August 3, 2022, telephone call, patient states symptoms have improved and he no longer has any issues. He continues to take Flomax and Proscar.     January 17, 2023, patient arrives alone. He has lost 30 pounds as a side effect of one of his medications from pain management. He is discontinued that medicine and has stabilized. Dr. Laboy his cardiologist is manipulating his antihypertensive medication and he does occasionally have dizziness. He has been taking Flomax for over 3 years without side effect. It is most probably secondary to his weight loss and other medications. Today, now that he is on both Flomax and Proscar, his parameters are excellent. Flow rate of 10 cc/s, total volume 105 cc, PVR 17 cc. Urinalysis is negative for blood. Renal colic CAT scan does not identify any stones in the urinary tract. He will return in 6 months     July 28, 2023, patient arrives alone. He continues on Flomax and Proscar with excellent results. Flow rate of 12 cc/s with a PVR of 7 cc. Urinalysis is negative for blood, urine culture no growth, urine cytology is lacking atypia. Corrected PSA is 13.28 which is significantly lower than his PSA of 35.50  on June 13, 2019 when he underwent a negative prostate biopsy. He will return in December.    August 8, 2023, patient admitted to [facility] for transient aphasia    December 2023, patient discontinued Proscar secondary to gynecomastia.    PLAVIX    February 2, 2024, patient arrives alone.  He continues on Flomax alone with a flow rate 10 cc/s, total volume 212 cc,  cc.  He is now developed CKD stage III.  Corrected PSA is 11.10 which is significantly lower than the PSA 35.50 on June 13, 2019 when he underwent a negative prostate biopsy.  He will return in June.     PLAN:     #1 patient will return in December 2024 with a flow rate and PVR     Also in June 2024 with a PSA, urine studies, flow rate and PVR.      #2 continue Flomax 0.4 mg by mouth daily     Physical Exam  Vitals and nursing note reviewed.   Constitutional:       Appearance: Normal appearance.   HENT:      Head: Normocephalic and atraumatic.   Pulmonary:      Effort: Pulmonary effort is normal.   Abdominal:      Palpations: Abdomen is soft.      Tenderness: There is no abdominal tenderness.   Musculoskeletal:         General: Normal range of motion.      Cervical back: Normal range of motion and neck supple.   Neurological:      General: No focal deficit present.      Mental Status: He is alert and oriented to person, place, and time.   Psychiatric:         Mood and Affect: Mood normal.         Behavior: Behavior normal.         This note was created with voice-recognition software and was not corrected for typographical or grammatical errors.

## 2024-02-07 ENCOUNTER — TELEPHONE (OUTPATIENT)
Dept: CARDIOLOGY | Facility: HOSPITAL | Age: 79
End: 2024-02-07
Payer: MEDICARE

## 2024-02-07 DIAGNOSIS — I25.10 ATHEROSCLEROSIS OF CORONARY ARTERY OF NATIVE HEART WITHOUT ANGINA PECTORIS, UNSPECIFIED VESSEL OR LESION TYPE: ICD-10-CM

## 2024-02-07 DIAGNOSIS — I63.9 ACUTE CVA (CEREBROVASCULAR ACCIDENT) (MULTI): ICD-10-CM

## 2024-02-07 DIAGNOSIS — I10 PRIMARY HYPERTENSION: ICD-10-CM

## 2024-02-07 NOTE — TELEPHONE ENCOUNTER
Patient dropped off Echo results that were ordered by PCP after inpatient stay in August for TIA vs CVA episode.     Seen with Dr. JIMMY Wilks MD on 8/29.   Echo performed on 10/4    Of note- I pulled information from discharge of above admission.     08/08/2023 8:06 PM EDT   MRI Brain:  1. 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  2. Several punctate foci of hyperintense FLAIR and T2 signal present in the periventricular and subcortical white matter of bilateral cerebral hemispheres are nonspecific, but favored to represent sequela of microvascular disease.     Patient is following with Neuro.   On ASA and Zetia.     Has April follow up with Dr. Cristiano Laboy MD [facility]     Printed Echo and placed for Dr. Cristiano Laboy MD [facility] review tomorrow in office.

## 2024-02-09 RX ORDER — CLOPIDOGREL BISULFATE 75 MG/1
75 TABLET ORAL DAILY
Qty: 90 TABLET | Refills: 3 | Status: SHIPPED | OUTPATIENT
Start: 2024-02-09 | End: 2024-03-06 | Stop reason: SDUPTHER

## 2024-02-09 RX ORDER — METOPROLOL SUCCINATE 25 MG/1
25 TABLET, EXTENDED RELEASE ORAL DAILY
Qty: 90 TABLET | Refills: 3 | Status: SHIPPED | OUTPATIENT
Start: 2024-02-09 | End: 2025-02-08

## 2024-02-09 RX ORDER — LISINOPRIL 5 MG/1
5 TABLET ORAL DAILY
Qty: 90 TABLET | Refills: 3 | Status: SHIPPED | OUTPATIENT
Start: 2024-02-09 | End: 2025-02-08

## 2024-02-09 RX ORDER — EZETIMIBE 10 MG/1
10 TABLET ORAL DAILY
Qty: 90 TABLET | Refills: 3 | Status: SHIPPED | OUTPATIENT
Start: 2024-02-09 | End: 2024-03-06 | Stop reason: SDUPTHER

## 2024-02-09 RX ORDER — HYDROCHLOROTHIAZIDE 12.5 MG/1
12.5 TABLET ORAL
Qty: 90 TABLET | Refills: 3 | Status: SHIPPED | OUTPATIENT
Start: 2024-02-09 | End: 2024-04-09 | Stop reason: ALTCHOICE

## 2024-02-11 ENCOUNTER — APPOINTMENT (OUTPATIENT)
Dept: RADIOLOGY | Facility: HOSPITAL | Age: 79
End: 2024-02-11
Payer: MEDICARE

## 2024-02-13 ENCOUNTER — OFFICE VISIT (OUTPATIENT)
Dept: ORTHOPEDIC SURGERY | Facility: CLINIC | Age: 79
End: 2024-02-13
Payer: MEDICARE

## 2024-02-13 ENCOUNTER — TELEPHONE (OUTPATIENT)
Dept: ORTHOPEDIC SURGERY | Facility: CLINIC | Age: 79
End: 2024-02-13

## 2024-02-13 DIAGNOSIS — M54.16 LUMBAR RADICULOPATHY: Primary | ICD-10-CM

## 2024-02-13 PROBLEM — M48.061 SPINAL STENOSIS, LUMBAR REGION WITHOUT NEUROGENIC CLAUDICATION: Status: ACTIVE | Noted: 2024-02-21

## 2024-02-13 PROBLEM — M43.16 SPONDYLOLISTHESIS, LUMBAR REGION: Status: ACTIVE | Noted: 2024-02-21

## 2024-02-13 PROCEDURE — 1160F RVW MEDS BY RX/DR IN RCRD: CPT | Performed by: ORTHOPAEDIC SURGERY

## 2024-02-13 PROCEDURE — 1036F TOBACCO NON-USER: CPT | Performed by: ORTHOPAEDIC SURGERY

## 2024-02-13 PROCEDURE — 1126F AMNT PAIN NOTED NONE PRSNT: CPT | Performed by: ORTHOPAEDIC SURGERY

## 2024-02-13 PROCEDURE — 99215 OFFICE O/P EST HI 40 MIN: CPT | Performed by: ORTHOPAEDIC SURGERY

## 2024-02-13 PROCEDURE — 1159F MED LIST DOCD IN RCRD: CPT | Performed by: ORTHOPAEDIC SURGERY

## 2024-02-13 NOTE — TELEPHONE ENCOUNTER
2/13/24 - Patient in office at [facility].   Explained to patient that he received a lumbar brace in Aug 2021 so per medicare guidelines, he is not eligible for another until Aug of 2026 (once every 5yrs).  Per Dr. Norton, if patient is able to find the brace, he can bring to hospital with him on day of surgery.   If patient is not able to find his lumbar brace, then he should be fine without it.

## 2024-02-13 NOTE — PROGRESS NOTES
Russ Lagos is a 78 y.o. male who presents for Follow-up of the Lower Back (MRI done at ).    HPI:  78-year-old gentleman here for follow-up of low back MRI review.  He denies any fever chills nausea vomiting night sweats.  He has no bowel or bladder complaints.    Physical exam:  Well-nourished, well kept.  Gait normal.  Can stand on heels and toes.   Examination of the back shows tenderness in the paraspinous musculature.  There is mildly decreased range of motion in all directions due to guarding/muscle spasms and pain at extremes.  There is good strength and no instability.  Examination of the lower extremities reveals no point tenderness, swelling, or deformity.  Range of motion of the hips, knees, and ankles are full without crepitance, instability, or exacerbation of pain.  Strength is 5/5 throughout.  No redness, abrasions, or lesions on extremities  Gross sensation intact in the extremities.   Affect normal.  Alert and oriented ×3.  Coordination normal.    Assessment:  78-year-old gentleman here for follow-up MRI results.  He is having low back and mostly left leg symptoms.  He had an L2-S1 midline laminectomy with Dr. Norton in 2021.  He was doing good up until about 8 months ago.  He is currently doing physical therapy, and he notices that after therapy he gets pain down the backs of both legs.  It gets so bad that it wakes him up at night now.  This is starting to affect his bodily function.  He has a spondylolisthesis at L4 on L5, and it looks like he may have pars defects at L4 on his MRI.  He would consider surgery again.    Plan:  For complete plan and/or surgical details, please refer to Dr. Norton's portion of this split dictation.    In a face-to-face encounter, I performed a history and physical examination, discussed pertinent diagnostic studies if indicated, and discussed diagnosis and management strategies with both the patient and the midlevel provider.  I reviewed the  midlevel's note and agree with the documented findings and plan of care.    Here for an MRI follow-up.  He had a midline L2-S1 midline laminectomy in 2021.  He was doing great and then about 6 months ago started developing left greater than right radicular symptoms.  He has back pain as well.  He has a spondylolisthesis which is dynamic at L4-5.  MRI shows that he also has some stenosis centrally and bilateral foraminally at L4-5.  Other than that I do not see any real significant stenosis.  I had a long discussion with the patient and explained to them that their options are 1) live with the symptoms and see how they evolve, 2) physical therapy, 3) pain management or 4) surgery.    The patient has been doing physical therapy and he is no better.  In fact it is making him more sore.  He does not really have any significant neurogenic claudication type symptoms.  But when he does prolonged standing or any sort of activities his back hurts and his legs seem to hurt all the time even when he is laying in bed.  On exam I do not get significant weakness of anyone localized nerve root.  This is mainly subjective significant pain which is severely inhibiting his bodily function as he is having difficulty functioning in life.    The patient understands that surgery is elective.  They understand that surgery comes with more risks than not doing surgery.  They understand they do not have to do surgery.  However, they wish to proceed with surgery.  All of the risks, benefits, and potential complications for operative and nonoperative treatment were discussed at length with the patient.  Risks of operative intervention include, but are not limited to: 1) anesthesia complications, 2) extensive blood loss, 3) infection, 4) damage to uninjured structures including, but not limited to: The dural sac and nerve roots, 5) blood clots, and 6) lack of improvement or worsening of symptoms.  These complications could result in death,  permanent disability, or need for reoperation.  Upon leaving the office today the patient completely understood these risks and wishes to proceed with operative intervention.    Patient understands his increased risk of intraoperative complications given the revision nature of surgery.  He understands that he is on Plavix and vitamin D and the increased risk of bleeding given the Plavix.  He still wants to proceed with surgery.  Regarding perform an L4-5 laminectomy with a globus retractor coming in from the left side.  This will be a revision laminectomy will take this facet completely down and will do an interbody cage instrumentation and posterolateral fusion at that level.  He understands the issues with adjacent level breakdown as well.  It does look like the patient has a pars defect on the left of L4 so we may need to remove a Patten fragment intraoperatively.

## 2024-02-14 ENCOUNTER — OFFICE VISIT (OUTPATIENT)
Dept: FAMILY MEDICINE CLINIC | Age: 79
End: 2024-02-14

## 2024-02-14 VITALS
WEIGHT: 213 LBS | TEMPERATURE: 97.5 F | DIASTOLIC BLOOD PRESSURE: 68 MMHG | SYSTOLIC BLOOD PRESSURE: 140 MMHG | BODY MASS INDEX: 27.34 KG/M2 | HEIGHT: 74 IN

## 2024-02-14 DIAGNOSIS — Z23 NEED FOR PNEUMOCOCCAL VACCINE: ICD-10-CM

## 2024-02-14 DIAGNOSIS — N18.31 STAGE 3A CHRONIC KIDNEY DISEASE (HCC): ICD-10-CM

## 2024-02-14 DIAGNOSIS — Z01.818 PREOP GENERAL PHYSICAL EXAM: Primary | ICD-10-CM

## 2024-02-14 DIAGNOSIS — M43.16 SPONDYLOLISTHESIS AT L4-L5 LEVEL: ICD-10-CM

## 2024-02-14 DIAGNOSIS — M05.9 RHEUMATOID ARTHRITIS WITH POSITIVE RHEUMATOID FACTOR, INVOLVING UNSPECIFIED SITE (HCC): ICD-10-CM

## 2024-02-14 RX ORDER — LISINOPRIL 5 MG/1
5 TABLET ORAL DAILY
COMMUNITY
Start: 2024-01-30

## 2024-02-14 NOTE — H&P (VIEW-ONLY)
Subjective  Russ Lagos 1945 is a 78 y.o. male who presents today with:  Chief Complaint   Patient presents with   • Pre-op Exam     Scheduled for a surgery on his lumbar spine with Dr. Norton at  on 02/21/24. Scheduled for his presurgical testing tomorrow. He does take Plavix.        HPI  I have reviewed HPI and agree with above.     Review of Systems   All other systems reviewed and are negative.      Past Medical History:   Diagnosis Date   • Arthritis     R/A   • CAD (coronary artery disease)     cardiac stents x 3   • Chronic back pain    • COPD (chronic obstructive pulmonary disease) ([facility])     past smoker > 25 yr habit / quit 25 yrs ago   • COPD (chronic obstructive pulmonary disease) ([facility])    • Coronary artery disease    • Hemorrhoids    • Hyperlipidemia     past trx / cannot tolerate statins   • Hypertension     meds > 10 yrs / denies TIA or stroke   • Neuropathy    • PMR (polymyalgia rheumatica) ([facility])    • Restless leg syndrome      Past Surgical History:   Procedure Laterality Date   • ARM SURGERY Left 2/20/2023    LEFT ELBOW INCISION AND DRAINAGE, DEBRIDEMENT LEFT OLECRANON performed by Brian Babcock MD at [facility] [place]   • CARDIAC SURGERY      2-3 cardiac stents   • CARPAL TUNNEL RELEASE     • EYE SURGERY  right    retina hole / Phaco with IOL    • CT NEUROPLASTY &/TRANSPOS MEDIAN NRV CARPAL TUNNE Right 1/11/2018    RIGHT WRIST CARPAL TUNNEL RELEASE performed by Yovani Morgan MD at [facility] [place]   • PTCA      2-3 cardiac stents     Social History     Socioeconomic History   • Marital status:      Spouse name: Not on file   • Number of children: Not on file   • Years of education: Not on file   • Highest education level: Not on file   Occupational History   • Not on file   Tobacco Use   • Smoking status: Former     Current packs/day: 0.00     Average packs/day: 1 pack/day for 25.0 years (25.0 ttl pk-yrs)     Types: Cigarettes     Start date: 2/8/1967     Quit date: 2/8/1992     Years  since quittin.0   • Smokeless tobacco: Never   Vaping Use   • Vaping Use: Never used   Substance and Sexual Activity   • Alcohol use: Not Currently     Comment: occasional    • Drug use: No   • Sexual activity: Not on file   Other Topics Concern   • Not on file   Social History Narrative   • Not on file     Social Determinants of Health     Financial Resource Strain: Low Risk  (2023)    Overall Financial Resource Strain (CARDIA)    • Difficulty of Paying Living Expenses: Not hard at all   Food Insecurity: Not on file (2023)   Transportation Needs: Unknown (2023)    PRAPARE - Transportation    • Lack of Transportation (Medical): Not on file    • Lack of Transportation (Non-Medical): No   Physical Activity: Inactive (2024)    Exercise Vital Sign    • Days of Exercise per Week: 0 days    • Minutes of Exercise per Session: 0 min   Stress: Not on file   Social Connections: Not on file   Intimate Partner Violence: Not on file   Housing Stability: Unknown (2023)    Housing Stability Vital Sign    • Unable to Pay for Housing in the Last Year: Not on file    • Number of Places Lived in the Last Year: Not on file    • Unstable Housing in the Last Year: No     Family History   Problem Relation Age of Onset   • Heart Attack Mother    • Cancer Father         LYMPHOMA   • Heart Disease Sister         pacemaker     Allergies   Allergen Reactions   • Seasonal      Sinus congestion   • Statins      MYALGIAS     Current Outpatient Medications   Medication Sig Dispense Refill   • lisinopril (PRINIVIL;ZESTRIL) 5 MG tablet Take 1 tablet by mouth daily     • famotidine (PEPCID) 40 MG tablet Take 1 tablet by mouth daily     • allopurinol (ZYLOPRIM) 100 MG tablet Take 1 tablet by mouth daily     • hydroCHLOROthiazide (MICROZIDE) 12.5 MG capsule Take 1 capsule by mouth daily     • finasteride (PROSCAR) 5 MG tablet Take 1 tablet by mouth daily     • gabapentin (NEURONTIN) 600 MG tablet Take 1 tablet by mouth 3  "times daily.     • clopidogrel (PLAVIX) 75 MG tablet Take 1 tablet by mouth daily 30 tablet 2   • tamsulosin (FLOMAX) 0.4 MG capsule Take by mouth     • montelukast (SINGULAIR) 10 MG tablet Take 1 tablet by mouth nightly     • metoprolol succinate (TOPROL XL) 25 MG extended release tablet take 1 tablet daily  3   • folic acid (FOLVITE) 1 MG tablet Take 1 tablet by mouth daily     • Nebulizer MISC by Does not apply route     • vitamin D-3 (CHOLECALCIFEROL) 5000 UNITS TABS Take 1 tablet by mouth daily     • magnesium oxide (MAG-OX) 400 MG tablet Take 1 tablet by mouth daily       Current Facility-Administered Medications   Medication Dose Route Frequency Provider Last Rate Last Admin   • albuterol (PROVENTIL) nebulizer solution 2.5 mg  2.5 mg Nebulization Once John Anthony DO           PMH, Surgical Hx, Family Hx, and Social Hx reviewed and updated.  Health Maintenance reviewed.    Objective  Vitals:    02/14/24 1334   BP: (!) 140/68   Temp: 97.5 °F (36.4 °C)   TempSrc: Temporal   Weight: 96.6 kg (213 lb)   Height: 1.88 m (6' 2.02\")     BP Readings from Last 3 Encounters:   02/14/24 (!) 140/68   01/18/24 116/64   11/29/23 116/62     Wt Readings from Last 3 Encounters:   02/14/24 96.6 kg (213 lb)   01/18/24 96.6 kg (213 lb)   11/29/23 91.2 kg (201 lb)       Lab Results   Component Value Date    LABA1C 6.0 (A) 08/08/2023    LABA1C 6.1 (H) 02/22/2019    LABA1C 6.2 (H) 06/20/2018     Lab Results   Component Value Date    CREATININE 1.29 08/08/2023     Lab Results   Component Value Date    ALT 19 08/08/2023    AST 20 08/08/2023     Lab Results   Component Value Date    CHOL 174 08/08/2023    TRIG 144 08/08/2023    HDL 44.4 08/08/2023    LDLCALC 118 11/01/2022          Physical Exam  Vitals reviewed.   Constitutional:       Appearance: Normal appearance. He is normal weight.   HENT:      Head: Normocephalic and atraumatic.      Right Ear: Tympanic membrane normal.      Left Ear: Tympanic membrane normal.      Nose: " Nose normal.      Mouth/Throat:      Mouth: Mucous membranes are dry.   Eyes:      Extraocular Movements: Extraocular movements intact.      Conjunctiva/sclera: Conjunctivae normal.      Pupils: Pupils are equal, round, and reactive to light.   Cardiovascular:      Rate and Rhythm: Normal rate and regular rhythm.      Pulses: Normal pulses.      Heart sounds: Normal heart sounds.   Pulmonary:      Effort: Pulmonary effort is normal.      Breath sounds: Normal breath sounds.   Abdominal:      General: Abdomen is flat. Bowel sounds are normal.      Palpations: Abdomen is soft.   Musculoskeletal:         General: Normal range of motion.      Cervical back: Normal range of motion and neck supple.   Skin:     General: Skin is warm and dry.   Neurological:      General: No focal deficit present.      Mental Status: He is alert.   Psychiatric:         Mood and Affect: Mood normal.         Behavior: Behavior normal.         Thought Content: Thought content normal.         Judgment: Judgment normal.       Assessment & Plan   1. Preop general physical exam-  except for age, Mr Lagos is low risk for procedure pending presurgical testing.  Overall risk tratification is low intermediate.  Benefits outweigh risks.  2. Spondylolisthesis at L4-L5 level - see #1  3. Rheumatoid arthritis with positive rheumatoid factor, involving unspecified site (HCC) - stable  4. Stage 3a chronic kidney disease (HCC) - no longer in stage 3.     No orders of the defined types were placed in this encounter.    No orders of the defined types were placed in this encounter.    Medications Discontinued During This Encounter   Medication Reason   • lisinopril (PRINIVIL;ZESTRIL) 10 MG tablet LIST CLEANUP   • methylPREDNISolone (MEDROL DOSEPACK) 4 MG tablet Therapy completed   • ezetimibe (ZETIA) 10 MG tablet Side effects     No follow-ups on file.        Reviewed with the patient: current clinical status, medications, activities and diet.     Side  effects, adverse effects of the medication prescribed today, as well as treatment plan/ rationale and result expectations have been discussed with the patient who expresses understanding and desires to proceed.    Close follow up to evaluate treatment results and for coordination of care.  I have reviewed the patient's medical history in detail and updated the computerized patient record.    IVONNE VILLALBA, DO

## 2024-02-14 NOTE — PROGRESS NOTES
Subjective  Parker Polanco 1945 is a 78 y.o. male who presents today with:  Chief Complaint   Patient presents with    Pre-op Exam     Scheduled for a surgery on his lumbar spine with Dr. Kim at  on 24. Scheduled for his presurgical testing tomorrow. He does take Plavix.        HPI  I have reviewed HPI and agree with above.     Review of Systems   All other systems reviewed and are negative.      Past Medical History:   Diagnosis Date    Arthritis     R/A    CAD (coronary artery disease)     cardiac stents x 3    Chronic back pain     COPD (chronic obstructive pulmonary disease) ([facility])     past smoker > 25 yr habit / quit 25 yrs ago    COPD (chronic obstructive pulmonary disease) ([facility])     Coronary artery disease     Hemorrhoids     Hyperlipidemia     past trx / cannot tolerate statins    Hypertension     meds > 10 yrs / denies TIA or stroke    Neuropathy     PMR (polymyalgia rheumatica) ([facility])     Restless leg syndrome      Past Surgical History:   Procedure Laterality Date    ARM SURGERY Left 2023    LEFT ELBOW INCISION AND DRAINAGE, DEBRIDEMENT LEFT OLECRANON [place] Amos [place]iz [place] [facility] [place]    CARDIAC SURGERY      2-3 cardiac stents    CARPAL TUNNEL RELEASE      EYE SURGERY  right    retina hole / Phaco with IOL     MD NEUROPLASTY &/TRANSPOS MEDIAN NRV CARPAL TUNNE Right 2018    RIGHT WRIST CARPAL TUNNEL RELEASE performed [place] Alan Gregorio [place] [facility] [place] [place]    PTCA      2-3 cardiac stents     Social History     Socioeconomic History    Marital status:      Spouse name: Not on file    Number of children: Not on file    Years of education: Not on file    Highest education level: Not on file   Occupational History    Not on file   Tobacco Use    Smoking status: Former     Current packs/day: 0.00     Average packs/day: 1 pack/day for 25.0 years (25.0 ttl pk-yrs)     Types: Cigarettes     Start date: 1967     Quit date: 1992     Years since quittin.0

## 2024-02-15 ENCOUNTER — PRE-ADMISSION TESTING (OUTPATIENT)
Dept: PREADMISSION TESTING | Facility: HOSPITAL | Age: 79
End: 2024-02-15
Payer: MEDICARE

## 2024-02-15 ENCOUNTER — LAB (OUTPATIENT)
Dept: LAB | Facility: LAB | Age: 79
End: 2024-02-15
Payer: MEDICARE

## 2024-02-15 VITALS
TEMPERATURE: 97.5 F | HEIGHT: 74 IN | RESPIRATION RATE: 16 BRPM | BODY MASS INDEX: 27.47 KG/M2 | WEIGHT: 214.07 LBS | OXYGEN SATURATION: 94 % | SYSTOLIC BLOOD PRESSURE: 123 MMHG | HEART RATE: 73 BPM | DIASTOLIC BLOOD PRESSURE: 76 MMHG

## 2024-02-15 DIAGNOSIS — Z01.818 PRE-OP EXAMINATION: ICD-10-CM

## 2024-02-15 DIAGNOSIS — M48.061 SPINAL STENOSIS, LUMBAR REGION WITHOUT NEUROGENIC CLAUDICATION: Primary | ICD-10-CM

## 2024-02-15 DIAGNOSIS — Z01.818 PRE-OP TESTING: ICD-10-CM

## 2024-02-15 LAB
ALBUMIN SERPL BCP-MCNC: 4 G/DL (ref 3.4–5)
ALP SERPL-CCNC: 50 U/L (ref 33–136)
ALT SERPL W P-5'-P-CCNC: 19 U/L (ref 10–52)
ANION GAP SERPL CALC-SCNC: 12 MMOL/L (ref 10–20)
APPEARANCE UR: CLEAR
APTT PPP: 31 SECONDS (ref 27–38)
AST SERPL W P-5'-P-CCNC: 23 U/L (ref 9–39)
ATRIAL RATE: 59 BPM
BASOPHILS # BLD AUTO: 0.04 X10*3/UL (ref 0–0.1)
BASOPHILS NFR BLD AUTO: 0.5 %
BILIRUB SERPL-MCNC: 0.6 MG/DL (ref 0–1.2)
BILIRUB UR STRIP.AUTO-MCNC: NEGATIVE MG/DL
BUN SERPL-MCNC: 27 MG/DL (ref 6–23)
CALCIUM SERPL-MCNC: 9.8 MG/DL (ref 8.6–10.3)
CHLORIDE SERPL-SCNC: 105 MMOL/L (ref 98–107)
CO2 SERPL-SCNC: 28 MMOL/L (ref 21–32)
COLOR UR: YELLOW
CREAT SERPL-MCNC: 1.33 MG/DL (ref 0.5–1.3)
EGFRCR SERPLBLD CKD-EPI 2021: 54 ML/MIN/1.73M*2
EOSINOPHIL # BLD AUTO: 0.47 X10*3/UL (ref 0–0.4)
EOSINOPHIL NFR BLD AUTO: 5.7 %
ERYTHROCYTE [DISTWIDTH] IN BLOOD BY AUTOMATED COUNT: 15.2 % (ref 11.5–14.5)
GLUCOSE SERPL-MCNC: 100 MG/DL (ref 74–99)
GLUCOSE UR STRIP.AUTO-MCNC: NEGATIVE MG/DL
HCT VFR BLD AUTO: 46 % (ref 41–52)
HGB BLD-MCNC: 14.7 G/DL (ref 13.5–17.5)
HOLD SPECIMEN: NORMAL
IMM GRANULOCYTES # BLD AUTO: 0.02 X10*3/UL (ref 0–0.5)
IMM GRANULOCYTES NFR BLD AUTO: 0.2 % (ref 0–0.9)
INR PPP: 1 (ref 0.9–1.1)
KETONES UR STRIP.AUTO-MCNC: NEGATIVE MG/DL
LEUKOCYTE ESTERASE UR QL STRIP.AUTO: NEGATIVE
LYMPHOCYTES # BLD AUTO: 1.17 X10*3/UL (ref 0.8–3)
LYMPHOCYTES NFR BLD AUTO: 14.3 %
MCH RBC QN AUTO: 30.9 PG (ref 26–34)
MCHC RBC AUTO-ENTMCNC: 32 G/DL (ref 32–36)
MCV RBC AUTO: 97 FL (ref 80–100)
MONOCYTES # BLD AUTO: 0.52 X10*3/UL (ref 0.05–0.8)
MONOCYTES NFR BLD AUTO: 6.3 %
NEUTROPHILS # BLD AUTO: 5.98 X10*3/UL (ref 1.6–5.5)
NEUTROPHILS NFR BLD AUTO: 73 %
NITRITE UR QL STRIP.AUTO: NEGATIVE
NRBC BLD-RTO: 0 /100 WBCS (ref 0–0)
P AXIS: 32 DEGREES
P OFFSET: 204 MS
P ONSET: 141 MS
PH UR STRIP.AUTO: 5 [PH]
PLATELET # BLD AUTO: 192 X10*3/UL (ref 150–450)
POTASSIUM SERPL-SCNC: 4.4 MMOL/L (ref 3.5–5.3)
PR INTERVAL: 166 MS
PROT SERPL-MCNC: 6.5 G/DL (ref 6.4–8.2)
PROT UR STRIP.AUTO-MCNC: NEGATIVE MG/DL
PROTHROMBIN TIME: 11.5 SECONDS (ref 9.8–12.8)
Q ONSET: 224 MS
QRS COUNT: 10 BEATS
QRS DURATION: 154 MS
QT INTERVAL: 428 MS
QTC CALCULATION(BAZETT): 423 MS
QTC FREDERICIA: 425 MS
R AXIS: 105 DEGREES
RBC # BLD AUTO: 4.75 X10*6/UL (ref 4.5–5.9)
RBC # UR STRIP.AUTO: NEGATIVE /UL
SODIUM SERPL-SCNC: 141 MMOL/L (ref 136–145)
SP GR UR STRIP.AUTO: 1.02
T AXIS: 58 DEGREES
T OFFSET: 438 MS
UROBILINOGEN UR STRIP.AUTO-MCNC: <2 MG/DL
VENTRICULAR RATE: 59 BPM
WBC # BLD AUTO: 8.2 X10*3/UL (ref 4.4–11.3)

## 2024-02-15 PROCEDURE — 80053 COMPREHEN METABOLIC PANEL: CPT

## 2024-02-15 PROCEDURE — 87081 CULTURE SCREEN ONLY: CPT | Mod: STJLAB | Performed by: PHYSICIAN ASSISTANT

## 2024-02-15 PROCEDURE — 85730 THROMBOPLASTIN TIME PARTIAL: CPT

## 2024-02-15 PROCEDURE — 36415 COLL VENOUS BLD VENIPUNCTURE: CPT

## 2024-02-15 PROCEDURE — 93005 ELECTROCARDIOGRAM TRACING: CPT

## 2024-02-15 PROCEDURE — 85025 COMPLETE CBC W/AUTO DIFF WBC: CPT

## 2024-02-15 PROCEDURE — 93010 ELECTROCARDIOGRAM REPORT: CPT | Performed by: INTERNAL MEDICINE

## 2024-02-15 PROCEDURE — 85610 PROTHROMBIN TIME: CPT

## 2024-02-15 PROCEDURE — 99204 OFFICE O/P NEW MOD 45 MIN: CPT | Performed by: NURSE PRACTITIONER

## 2024-02-15 PROCEDURE — 81003 URINALYSIS AUTO W/O SCOPE: CPT

## 2024-02-15 RX ORDER — CHOLECALCIFEROL (VITAMIN D3) 50 MCG
100 TABLET ORAL DAILY
COMMUNITY

## 2024-02-15 RX ORDER — FOLIC ACID 0.8 MG
0.8 TABLET ORAL DAILY
COMMUNITY

## 2024-02-15 RX ORDER — CHLORHEXIDINE GLUCONATE ORAL RINSE 1.2 MG/ML
SOLUTION DENTAL
Qty: 473 ML | Refills: 0 | Status: SHIPPED | OUTPATIENT
Start: 2024-02-15 | End: 2024-02-22 | Stop reason: HOSPADM

## 2024-02-15 RX ORDER — SAW/VIT E/SOD SEL/LYC/BETA/PYG 160-100
4 TABLET ORAL DAILY
COMMUNITY
End: 2024-02-22 | Stop reason: HOSPADM

## 2024-02-15 ASSESSMENT — PAIN - FUNCTIONAL ASSESSMENT: PAIN_FUNCTIONAL_ASSESSMENT: 0-10

## 2024-02-15 ASSESSMENT — DUKE ACTIVITY SCORE INDEX (DASI)
CAN YOU TAKE CARE OF YOURSELF (EAT, DRESS, BATHE, OR USE TOILET): YES
CAN YOU DO LIGHT WORK AROUND THE HOUSE LIKE DUSTING OR WASHING DISHES: YES
CAN YOU PARTICIPATE IN STRENOUS SPORTS LIKE SWIMMING, SINGLES TENNIS, FOOTBALL, BASKETBALL, OR SKIING: NO
CAN YOU PARTICIPATE IN MODERATE RECREATIONAL ACTIVITIES LIKE GOLF, BOWLING, DANCING, DOUBLES TENNIS OR THROWING A BASEBALL OR FOOTBALL: NO
DASI METS SCORE: 7.6
CAN YOU WALK INDOORS, SUCH AS AROUND YOUR HOUSE: YES
CAN YOU DO MODERATE WORK AROUND THE HOUSE LIKE VACUUMING, SWEEPING FLOORS OR CARRYING GROCERIES: YES
CAN YOU DO YARD WORK LIKE RAKING LEAVES, WEEDING OR PUSHING A MOWER: YES
CAN YOU DO HEAVY WORK AROUND THE HOUSE LIKE SCRUBBING FLOORS OR LIFTING AND MOVING HEAVY FURNITURE: YES
CAN YOU HAVE SEXUAL RELATIONS: NO
CAN YOU CLIMB A FLIGHT OF STAIRS OR WALK UP A HILL: YES
CAN YOU RUN A SHORT DISTANCE: YES
CAN YOU WALK A BLOCK OR TWO ON LEVEL GROUND: YES
TOTAL_SCORE: 39.45

## 2024-02-15 ASSESSMENT — LIFESTYLE VARIABLES: SMOKING_STATUS: NONSMOKER

## 2024-02-15 ASSESSMENT — CHADS2 SCORE
PRIOR STROKE OR TIA OR THROMBOEMBOLISM: YES
HYPERTENSION: YES
CHF: NO
DIABETES: NO

## 2024-02-15 ASSESSMENT — PAIN SCALES - GENERAL: PAINLEVEL_OUTOF10: 0 - NO PAIN

## 2024-02-15 ASSESSMENT — ACTIVITIES OF DAILY LIVING (ADL): ADL_SCORE: 0

## 2024-02-15 NOTE — H&P (VIEW-ONLY)
"CPM/PAT Evaluation       Name: Russ Lagos (Russ Lagos)  /Age: 1945/78 y.o.     In-Person       Chief Complaint: lumbar back pains    HPI    LADONNA is a 77 yo male who has had lower back pains for years with surgery on the lumbar spine about 3 years ago- chronic lower back pains have returned and are worsening with \"soreness\" discomforts into left hip and thigh. He also suffers from peripheral neuropathy with n/t in feet and overall aches and pains. Denies any recent steroid injections. No recent falls or use of assistive devices. Imaging shows lumbar spinal stenosis and subsequently scheduled for lumbar laminectomy with fusion. Otherwise denies any recent illness, fever/chills, chest pains or shortness of breath.     Past Medical History:   Diagnosis Date    Acid reflux     Allergy status to unspecified drugs, medicaments and biological substances     History of seasonal allergies    Arrhythmia     NSVT and PSVT    Chronic pain disorder     CKD (chronic kidney disease)     stage 2-3    COPD (chronic obstructive pulmonary disease) (CMS/HCC)     Coronary artery disease     with PCI x3    Family history of prostate problems     Heart attack (CMS/HCC)     Hypertension     Lumbar disc disease     Old myocardial infarction     History of myocardial infarction    Peripheral neuropathy     Personal history of urinary calculi     History of renal calculi    PMR (polymyalgia rheumatica) (CMS/HCC)     Rheumatoid arthritis (CMS/HCC)     Sciatica     Syncope     TIA (transient ischemic attack)     Unspecified abdominal hernia without obstruction or gangrene     Hernia     PCP: Dr. Wilks  Cards: Dr. Laboy  EP: Dr. Valladares  Pulm: Dr. Pierce    TTE 10/4/2023  CONCLUSIONS:   1. Left ventricular systolic function is mildly decreased with a 45% estimated ejection fraction.   2. Spectral Doppler shows an impaired relaxation pattern of left ventricular diastolic filling.   3. Mild mitral valve regurgitation.   4. Aortic " valve stenosis is not present.   5. The main pulmonary artery is normal in size, and position, with normal bifurcation into the left and right pulmonary arteries.   6. There is global hypokinesis of the left ventricle with minor regional variations.    Chest xray in Aug 2023  IMPRESSION:  1.  No evidence of acute cardiopulmonary process.     Past Surgical History:   Procedure Laterality Date    BACK SURGERY      MR HEAD ANGIO WO IV CONTRAST  08/08/2023    MR HEAD ANGIO WO IV CONTRAST 8/8/2023 [facility]    MR NECK ANGIO WO IV CONTRAST  08/08/2023    MR NECK ANGIO WO IV CONTRAST 8/8/2023 [facility]    OTHER SURGICAL HISTORY  12/04/2014    Cath Placement Of Stent 2    OTHER SURGICAL HISTORY  11/17/2021    Cataract surgery    OTHER SURGICAL HISTORY  11/17/2021    Cardiac catheterization with stent placement    OTHER SURGICAL HISTORY  11/17/2021    Cornea transplantation    OTHER SURGICAL HISTORY  11/17/2021    Carpal tunnel surgery    OTHER SURGICAL HISTORY  11/17/2021    Percutaneous transluminal coronary angioplasty    OTHER SURGICAL HISTORY  11/17/2021    Renal lithotripsy    OTHER SURGICAL HISTORY  11/17/2021    Wrist surgery    US GUIDED BIOPSY PROSTATE  06/13/2019    US GUIDED BIOPSY PROSTATE 6/13/2019 [facility]    WRIST SURGERY         Patient Sexual activity questions deferred to the physician.    No family history on file.    Allergies   Allergen Reactions    Pollen Extracts Other     Sinus congestion    Statins-Hmg-Coa Reductase Inhibitors Myalgia       Prior to Admission medications    Medication Sig Start Date End Date Taking? Authorizing Provider   acetaminophen (Tylenol Arthritis Pain) 650 mg ER tablet Take by mouth 4 times a day.    Historical Provider, MD   albuterol 2.5 mg /3 mL (0.083 %) nebulizer solution 3 mL (2.5 mg). 6/20/17   Historical Provider, MD   albuterol 90 mcg/actuation inhaler Inhale. 1/12/22   Historical Provider, MD   allopurinol (Zyloprim) 100 mg tablet Take 1 tablet (100 mg) by  mouth once daily. 11/2/23   Historical Provider, MD   aspirin 81 mg capsule Take 1 tablet by mouth once daily. 6/20/07   Kaley Provider, MD   azelastine (Astelin) 137 mcg (0.1 %) nasal spray use 1 spray to each nostril 2 times daily as directed. 4/1/22   Kaley Dickerson MD   azithromycin (Zithromax) 250 mg tablet take 500mg by mouth on day one, then take 250mg by mouth everyday for 4 days. 7/26/23   Kaley Provider, MD   chlorhexidine (Peridex) 0.12 % solution Swish and spit 15 ml for 2 doses, 15mL the night before surgery and 15 ml morning of surgery - swish for 30 seconds -DO NOT SWALLOW, SPIT OUT 2/15/24   PEDRO Reeves-CNP   cholecalciferol (Vitamin D-3) 5,000 Units tablet Take 1 tablet (5,000 Units) by mouth once daily. 12/5/14   Kaley Provider, MD   clopidogrel (Plavix) 75 mg tablet Take 1 tablet (75 mg) by mouth once daily. 2/9/24 2/8/25  Cristiano Laboy MD   ezetimibe (Zetia) 10 mg tablet Take 1 tablet (10 mg) by mouth once daily. 2/9/24 2/8/25  Cristiano Laboy MD   famotidine (Pepcid) 40 mg tablet Take 1 tablet (40 mg) by mouth once daily.    Historical Provider, MD   finasteride (Proscar) 5 mg tablet Take 1 tablet (5 mg) by mouth once daily.    Historical Provider, MD   folic acid (Folvite) 1 mg tablet Take 1 tablet (1 mg) by mouth once daily. 6/11/08   Historical Provider, MD   gabapentin (Neurontin) 300 mg capsule take 1 (ONE) capsule (300 mg) BY MOUTH 3 (THREE) times per day for RLS. 4/12/22   Kaley Dickerson MD   gabapentin (Neurontin) 600 mg tablet Take 1 tablet (600 mg) by mouth 3 times a day.    Kaley Provider, MD   hydroCHLOROthiazide (HYDRODiuril) 12.5 mg tablet Take 1 tablet (12.5 mg) by mouth once daily in the morning. Take before meals. 2/9/24 2/8/25  Cristiano Laboy MD   HYDROcodone-acetaminophen (Norco) 5-325 mg tablet TAKE 1 TABLET BY MOUTH EVERY 6 HOURS AS NEEDED FOR PAIN for up to 7 (SEVEN) days 2/20/23   Historical Provider, MD   ipratropium-albuteroL  (Duo-Neb) 0.5-2.5 mg/3 mL nebulizer solution use the contents of 1 vial via nebulizer 4 times daily 1/12/22   Historical Provider, MD   lisinopril 5 mg tablet Take 1 tablet (5 mg) by mouth once daily. 2/9/24 2/8/25  Cristiano Laboy MD   magnesium oxide (Mag-Ox) 400 mg (241.3 mg magnesium) tablet Take 1 tablet (400 mg) by mouth once daily. 12/5/14   Historical Provider, MD   metoprolol succinate XL (Toprol-XL) 25 mg 24 hr tablet Take 1 tablet (25 mg) by mouth once daily. 2/9/24 2/8/25  Cristiano Laboy MD   metroNIDAZOLE (Flagyl) 500 mg tablet Take 1 tablet (500 mg) by mouth 4 times a day. 4/3/23   Historical Provider, MD   montelukast (Singulair) 10 mg tablet Take 1 tablet (10 mg) by mouth once daily at bedtime.    Historical Provider, MD   NEBULIZERS MISC by Does not apply route.    Historical Provider, MD   nitrofurantoin (Macrodantin) 50 mg capsule Take 1 capsule (50 mg) by mouth once daily. 6/29/22   Historical Provider, MD   omeprazole (PriLOSEC) 40 mg DR capsule Take 1 capsule (40 mg) by mouth once daily.    Historical Provider, MD   predniSONE (Deltasone) 10 mg tablet TAKE 4 TABLETS BY MOUTH FOR 3 DAYS, then TAKE 3 TABLETS FOR 3 DAYS, then TAKE 2 TABLETS FOR 3 DAYS, then TAKE 1 TABLET FOR 3 DAYS (COPD exacerbation)    Historical Provider, MD   saw/vit E/sod ayana/lyc/beta/pyg (PROSTATE HEALTH ORAL) Take by mouth.    Historical Provider, MD   tamsulosin (Flomax) 0.4 mg 24 hr capsule Take 1 capsule (0.4 mg) by mouth once daily.    Historical Provider, MD   tetracycline 500 mg capsule Take 1 capsule (500 mg) by mouth 4 times a day. 4/3/23   Historical Provider, MD JOANNE SHANKAR   Constitutional: Negative for fever, chills, or sweats   ENMT: Negative for nasal discharge, congestion, ear pain, mouth pain, throat pain; positive [place]  Respiratory: Negative for cough, wheezing, shortness of breath   Cardiac: Negative for chest pain, dyspnea on exertion, palpitations   Gastrointestinal: Negative for nausea, vomiting,  diarrhea, constipation, abdominal pain  Genitourinary: Negative for dysuria, flank pain, frequency, hematuria     Musculoskeletal: Positive for decreased ROM, pain, and weakness in lower back with n/t in feet; generalized aches     Neurological: Negative for dizziness, confusion, headache  Psychiatric: Negative for mood changes   Skin: Negative for itching, rash, ulcer    Hematologic/Lymph: Negative for bruising, easy bleeding  Allergic/Immunologic: Negative itching, sneezing, swelling      Physical Exam  HENT:      Head: Normocephalic.      Mouth/Throat:      Mouth: Mucous membranes are moist.   Eyes:      Extraocular Movements: Extraocular movements intact.   Cardiovascular:      Rate and Rhythm: Normal rate and regular rhythm.   Pulmonary:      Effort: Pulmonary effort is normal.      Breath sounds: Normal breath sounds.   Abdominal:      General: Abdomen is flat.      Palpations: Abdomen is soft.   Musculoskeletal:         General: Normal range of motion.      Cervical back: Normal range of motion.   Skin:     General: Skin is warm and dry.   Neurological:      General: No focal deficit present.      Mental Status: He is alert.   Psychiatric:         Mood and Affect: Mood normal.        PAT AIRWAY:   Airway:     Neck ROM::  Limited   implants    Anesthesia:  Patient denies any anesthesia complications.     Visit Vitals  /76   Pulse 73   Temp 36.4 °C (97.5 °F)   Resp 16       DASI Risk Score      Flowsheet Row Most Recent Value   DASI SCORE 39.45   METS Score (Will be calculated only when all the questions are answered) 7.6          Caprini DVT Assessment      Flowsheet Row Most Recent Value   DVT Score 8   Current Status COPD, Major surgery planned, lasting 2-3 hours, Major surgery planned, lasting over 3 hours   History Prior major surgery   Age Over 75 years          Modified Frailty Index      Flowsheet Row Most Recent Value   Modified Frailty Index Calculator .3636          CHADS2 Stroke Risk  Current  as of 10 minutes ago        N/A 3 - 100%: High Risk   2 - 3%: Medium Risk   0 - 2%: Low Risk     Last Change: N/A          This score determines the patient's risk of having a stroke if the patient has atrial fibrillation.        This score is not applicable to this patient. Components are not calculated.          Revised Cardiac Risk Index      Flowsheet Row Most Recent Value   Revised Cardiac Risk Calculator 2          Apfel Simplified Score    No data to display       Risk Analysis Index Results This Encounter         2/15/2024  1059             PRADO Cancer History: Patient does not indicate history of cancer    Total Risk Analysis Index Score Without Cancer: 29    Total Risk Analysis Index Score: 29          Stop Bang Score      Flowsheet Row Most Recent Value   Do you snore loudly? 0   Do you often feel tired or fatigued after your sleep? 1   Has anyone ever observed you stop breathing in your sleep? 0   Do you have or are you being treated for high blood pressure? 0   Recent BMI (Calculated) 27.5   Is BMI greater than 35 kg/m2? 0=No   Age older than 50 years old? 1=Yes   Is your neck circumference greater than 17 inches (Male) or 16 inches (Female)? 1   Gender - Male 1=Yes   STOP-BANG Total Score 4            Assessment and Plan:     TS is 77 yo male scheduled for L4-5 laminectomy revision  on 2/21/2024 with Dr. Norton. Blood work and MRSA ordered- oral rinse prescribed. EKG shows sinus bradycardia with right BBB, v rate of 59 bpm- comparable EKG on file. He has instruction on Plavix from provider. Otherwise no further orders indicated.     ASA 3: A to review    See risk scores as previously documented.

## 2024-02-15 NOTE — CPM/PAT H&P
"CPM/PAT Evaluation       Name: Russ Lagos (Russ Lagos)  /Age: 1945/78 y.o.     In-Person       Chief Complaint: lumbar back pains    HPI    LADONNA is a 77 yo male who has had lower back pains for years with surgery on the lumbar spine about 3 years ago- chronic lower back pains have returned and are worsening with \"soreness\" discomforts into left hip and thigh. He also suffers from peripheral neuropathy with n/t in feet and overall aches and pains. Denies any recent steroid injections. No recent falls or use of assistive devices. Imaging shows lumbar spinal stenosis and subsequently scheduled for lumbar laminectomy with fusion. Otherwise denies any recent illness, fever/chills, chest pains or shortness of breath.     Past Medical History:   Diagnosis Date    Acid reflux     Allergy status to unspecified drugs, medicaments and biological substances     History of seasonal allergies    Arrhythmia     NSVT and PSVT    Chronic pain disorder     CKD (chronic kidney disease)     stage 2-3    COPD (chronic obstructive pulmonary disease) (CMS/HCC)     Coronary artery disease     with PCI x3    Family history of prostate problems     Heart attack (CMS/HCC)     Hypertension     Lumbar disc disease     Old myocardial infarction     History of myocardial infarction    Peripheral neuropathy     Personal history of urinary calculi     History of renal calculi    PMR (polymyalgia rheumatica) (CMS/HCC)     Rheumatoid arthritis (CMS/HCC)     Sciatica     Syncope     TIA (transient ischemic attack)     Unspecified abdominal hernia without obstruction or gangrene     Hernia     PCP: Dr. Wilks  Cards: Dr. Laboy  EP: Dr. Valladares  Pulm: Dr. Pierce    TTE 10/4/2023  CONCLUSIONS:   1. Left ventricular systolic function is mildly decreased with a 45% estimated ejection fraction.   2. Spectral Doppler shows an impaired relaxation pattern of left ventricular diastolic filling.   3. Mild mitral valve regurgitation.   4. Aortic " valve stenosis is not present.   5. The main pulmonary artery is normal in size, and position, with normal bifurcation into the left and right pulmonary arteries.   6. There is global hypokinesis of the left ventricle with minor regional variations.    Chest xray in Aug 2023  IMPRESSION:  1.  No evidence of acute cardiopulmonary process.     Past Surgical History:   Procedure Laterality Date    BACK SURGERY      MR HEAD ANGIO WO IV CONTRAST  08/08/2023    MR HEAD ANGIO WO IV CONTRAST 8/8/2023 [facility]    MR NECK ANGIO WO IV CONTRAST  08/08/2023    MR NECK ANGIO WO IV CONTRAST 8/8/2023 [facility]    OTHER SURGICAL HISTORY  12/04/2014    Cath Placement Of Stent 2    OTHER SURGICAL HISTORY  11/17/2021    Cataract surgery    OTHER SURGICAL HISTORY  11/17/2021    Cardiac catheterization with stent placement    OTHER SURGICAL HISTORY  11/17/2021    Cornea transplantation    OTHER SURGICAL HISTORY  11/17/2021    Carpal tunnel surgery    OTHER SURGICAL HISTORY  11/17/2021    Percutaneous transluminal coronary angioplasty    OTHER SURGICAL HISTORY  11/17/2021    Renal lithotripsy    OTHER SURGICAL HISTORY  11/17/2021    Wrist surgery    US GUIDED BIOPSY PROSTATE  06/13/2019    US GUIDED BIOPSY PROSTATE 6/13/2019 [facility]    WRIST SURGERY         Patient Sexual activity questions deferred to the physician.    No family history on file.    Allergies   Allergen Reactions    Pollen Extracts Other     Sinus congestion    Statins-Hmg-Coa Reductase Inhibitors Myalgia       Prior to Admission medications    Medication Sig Start Date End Date Taking? Authorizing Provider   acetaminophen (Tylenol Arthritis Pain) 650 mg ER tablet Take by mouth 4 times a day.    Historical Provider, MD   albuterol 2.5 mg /3 mL (0.083 %) nebulizer solution 3 mL (2.5 mg). 6/20/17   Historical Provider, MD   albuterol 90 mcg/actuation inhaler Inhale. 1/12/22   Historical Provider, MD   allopurinol (Zyloprim) 100 mg tablet Take 1 tablet (100 mg) by  mouth once daily. 11/2/23   Historical Provider, MD   aspirin 81 mg capsule Take 1 tablet by mouth once daily. 6/20/07   Kaley Provider, MD   azelastine (Astelin) 137 mcg (0.1 %) nasal spray use 1 spray to each nostril 2 times daily as directed. 4/1/22   Kaley Dickerson MD   azithromycin (Zithromax) 250 mg tablet take 500mg by mouth on day one, then take 250mg by mouth everyday for 4 days. 7/26/23   Kaley Provider, MD   chlorhexidine (Peridex) 0.12 % solution Swish and spit 15 ml for 2 doses, 15mL the night before surgery and 15 ml morning of surgery - swish for 30 seconds -DO NOT SWALLOW, SPIT OUT 2/15/24   PEDRO Reeves-CNP   cholecalciferol (Vitamin D-3) 5,000 Units tablet Take 1 tablet (5,000 Units) by mouth once daily. 12/5/14   Kaley Provider, MD   clopidogrel (Plavix) 75 mg tablet Take 1 tablet (75 mg) by mouth once daily. 2/9/24 2/8/25  Cristiano Laboy MD   ezetimibe (Zetia) 10 mg tablet Take 1 tablet (10 mg) by mouth once daily. 2/9/24 2/8/25  Cristiano Laboy MD   famotidine (Pepcid) 40 mg tablet Take 1 tablet (40 mg) by mouth once daily.    Historical Provider, MD   finasteride (Proscar) 5 mg tablet Take 1 tablet (5 mg) by mouth once daily.    Historical Provider, MD   folic acid (Folvite) 1 mg tablet Take 1 tablet (1 mg) by mouth once daily. 6/11/08   Historical Provider, MD   gabapentin (Neurontin) 300 mg capsule take 1 (ONE) capsule (300 mg) BY MOUTH 3 (THREE) times per day for RLS. 4/12/22   Kaley Dickerson MD   gabapentin (Neurontin) 600 mg tablet Take 1 tablet (600 mg) by mouth 3 times a day.    Kaley Provider, MD   hydroCHLOROthiazide (HYDRODiuril) 12.5 mg tablet Take 1 tablet (12.5 mg) by mouth once daily in the morning. Take before meals. 2/9/24 2/8/25  Cristiano Laboy MD   HYDROcodone-acetaminophen (Norco) 5-325 mg tablet TAKE 1 TABLET BY MOUTH EVERY 6 HOURS AS NEEDED FOR PAIN for up to 7 (SEVEN) days 2/20/23   Historical Provider, MD   ipratropium-albuteroL  (Duo-Neb) 0.5-2.5 mg/3 mL nebulizer solution use the contents of 1 vial via nebulizer 4 times daily 1/12/22   Historical Provider, MD   lisinopril 5 mg tablet Take 1 tablet (5 mg) by mouth once daily. 2/9/24 2/8/25  Cristiano Laboy MD   magnesium oxide (Mag-Ox) 400 mg (241.3 mg magnesium) tablet Take 1 tablet (400 mg) by mouth once daily. 12/5/14   Historical Provider, MD   metoprolol succinate XL (Toprol-XL) 25 mg 24 hr tablet Take 1 tablet (25 mg) by mouth once daily. 2/9/24 2/8/25  Cristiano Laboy MD   metroNIDAZOLE (Flagyl) 500 mg tablet Take 1 tablet (500 mg) by mouth 4 times a day. 4/3/23   Historical Provider, MD   montelukast (Singulair) 10 mg tablet Take 1 tablet (10 mg) by mouth once daily at bedtime.    Historical Provider, MD   NEBULIZERS MISC by Does not apply route.    Historical Provider, MD   nitrofurantoin (Macrodantin) 50 mg capsule Take 1 capsule (50 mg) by mouth once daily. 6/29/22   Historical Provider, MD   omeprazole (PriLOSEC) 40 mg DR capsule Take 1 capsule (40 mg) by mouth once daily.    Historical Provider, MD   predniSONE (Deltasone) 10 mg tablet TAKE 4 TABLETS BY MOUTH FOR 3 DAYS, then TAKE 3 TABLETS FOR 3 DAYS, then TAKE 2 TABLETS FOR 3 DAYS, then TAKE 1 TABLET FOR 3 DAYS (COPD exacerbation)    Historical Provider, MD   saw/vit E/sod ayana/lyc/beta/pyg (PROSTATE HEALTH ORAL) Take by mouth.    Historical Provider, MD   tamsulosin (Flomax) 0.4 mg 24 hr capsule Take 1 capsule (0.4 mg) by mouth once daily.    Historical Provider, MD   tetracycline 500 mg capsule Take 1 capsule (500 mg) by mouth 4 times a day. 4/3/23   Historical Provider, MD JOANNE SHANKAR   Constitutional: Negative for fever, chills, or sweats   ENMT: Negative for nasal discharge, congestion, ear pain, mouth pain, throat pain; positive [place]  Respiratory: Negative for cough, wheezing, shortness of breath   Cardiac: Negative for chest pain, dyspnea on exertion, palpitations   Gastrointestinal: Negative for nausea, vomiting,  diarrhea, constipation, abdominal pain  Genitourinary: Negative for dysuria, flank pain, frequency, hematuria     Musculoskeletal: Positive for decreased ROM, pain, and weakness in lower back with n/t in feet; generalized aches     Neurological: Negative for dizziness, confusion, headache  Psychiatric: Negative for mood changes   Skin: Negative for itching, rash, ulcer    Hematologic/Lymph: Negative for bruising, easy bleeding  Allergic/Immunologic: Negative itching, sneezing, swelling      Physical Exam  HENT:      Head: Normocephalic.      Mouth/Throat:      Mouth: Mucous membranes are moist.   Eyes:      Extraocular Movements: Extraocular movements intact.   Cardiovascular:      Rate and Rhythm: Normal rate and regular rhythm.   Pulmonary:      Effort: Pulmonary effort is normal.      Breath sounds: Normal breath sounds.   Abdominal:      General: Abdomen is flat.      Palpations: Abdomen is soft.   Musculoskeletal:         General: Normal range of motion.      Cervical back: Normal range of motion.   Skin:     General: Skin is warm and dry.   Neurological:      General: No focal deficit present.      Mental Status: He is alert.   Psychiatric:         Mood and Affect: Mood normal.        PAT AIRWAY:   Airway:     Neck ROM::  Limited   implants    Anesthesia:  Patient denies any anesthesia complications.     Visit Vitals  /76   Pulse 73   Temp 36.4 °C (97.5 °F)   Resp 16       DASI Risk Score      Flowsheet Row Most Recent Value   DASI SCORE 39.45   METS Score (Will be calculated only when all the questions are answered) 7.6          Caprini DVT Assessment      Flowsheet Row Most Recent Value   DVT Score 8   Current Status COPD, Major surgery planned, lasting 2-3 hours, Major surgery planned, lasting over 3 hours   History Prior major surgery   Age Over 75 years          Modified Frailty Index      Flowsheet Row Most Recent Value   Modified Frailty Index Calculator .3636          CHADS2 Stroke Risk  Current  as of 10 minutes ago        N/A 3 - 100%: High Risk   2 - 3%: Medium Risk   0 - 2%: Low Risk     Last Change: N/A          This score determines the patient's risk of having a stroke if the patient has atrial fibrillation.        This score is not applicable to this patient. Components are not calculated.          Revised Cardiac Risk Index      Flowsheet Row Most Recent Value   Revised Cardiac Risk Calculator 2          Apfel Simplified Score    No data to display       Risk Analysis Index Results This Encounter         2/15/2024  1059             PRDAO Cancer History: Patient does not indicate history of cancer    Total Risk Analysis Index Score Without Cancer: 29    Total Risk Analysis Index Score: 29          Stop Bang Score      Flowsheet Row Most Recent Value   Do you snore loudly? 0   Do you often feel tired or fatigued after your sleep? 1   Has anyone ever observed you stop breathing in your sleep? 0   Do you have or are you being treated for high blood pressure? 0   Recent BMI (Calculated) 27.5   Is BMI greater than 35 kg/m2? 0=No   Age older than 50 years old? 1=Yes   Is your neck circumference greater than 17 inches (Male) or 16 inches (Female)? 1   Gender - Male 1=Yes   STOP-BANG Total Score 4            Assessment and Plan:     TS is 79 yo male scheduled for L4-5 laminectomy revision  on 2/21/2024 with Dr. Norton. Blood work and MRSA ordered- oral rinse prescribed. EKG shows sinus bradycardia with right BBB, v rate of 59 bpm- comparable EKG on file. He has instruction on Plavix from provider. Otherwise no further orders indicated.     ASA 3: A to review    See risk scores as previously documented.

## 2024-02-15 NOTE — PREPROCEDURE INSTRUCTIONS
Medication List            Accurate as of February 15, 2024 11:20 AM. Always use your most recent med list.                allopurinol 100 mg tablet  Commonly known as: Zyloprim  Medication Adjustments for Surgery: Take morning of surgery with sip of water, no other fluids     chlorhexidine 0.12 % solution  Commonly known as: Peridex  Swish and spit 15 ml for 2 doses, 15mL the night before surgery and 15 ml morning of surgery - swish for 30 seconds -DO NOT SWALLOW, SPIT OUT  Medication Adjustments for Surgery: Other (Comment)  Notes to patient: As indicated     cholecalciferol 50 MCG (2000 UT) tablet  Commonly known as: Vitamin D-3  Medication Adjustments for Surgery: Stop 7 days before surgery     clopidogrel 75 mg tablet  Commonly known as: Plavix  Take 1 tablet (75 mg) by mouth once daily.  Medication Adjustments for Surgery: Other (Comment)  Notes to patient: Coordinate with prescribing provider for further instructions on this medications prior to surgery.     ezetimibe 10 mg tablet  Commonly known as: Zetia  Take 1 tablet (10 mg) by mouth once daily.  Medication Adjustments for Surgery: Stop 1 day before surgery     famotidine 40 mg tablet  Commonly known as: Pepcid  Medication Adjustments for Surgery: Take morning of surgery with sip of water, no other fluids     folic acid 800 mcg tablet  Commonly known as: Folvite  Medication Adjustments for Surgery: Stop 7 days before surgery     gabapentin 600 mg tablet  Commonly known as: Neurontin  Medication Adjustments for Surgery: Take morning of surgery with sip of water, no other fluids     hydroCHLOROthiazide 12.5 mg tablet  Commonly known as: HYDRODiuril  Take 1 tablet (12.5 mg) by mouth once daily in the morning. Take before meals.  Medication Adjustments for Surgery: Take morning of surgery with sip of water, no other fluids     ipratropium-albuteroL 0.5-2.5 mg/3 mL nebulizer solution  Commonly known as: Duo-Neb  Medication Adjustments for Surgery: Other  (Comment)  Notes to patient: May use the morning of surgery if needed     lisinopril 5 mg tablet  Take 1 tablet (5 mg) by mouth once daily.  Medication Adjustments for Surgery: Other (Comment)  Notes to patient: HOLD any evening dose the night before the day of surgery  HOLD the day of surgery     magnesium oxide 400 mg (241.3 mg magnesium) tablet  Commonly known as: Mag-Ox  Medication Adjustments for Surgery: Continue until night before surgery     metoprolol succinate XL 25 mg 24 hr tablet  Commonly known as: Toprol-XL  Take 1 tablet (25 mg) by mouth once daily.  Medication Adjustments for Surgery: Take morning of surgery with sip of water, no other fluids     montelukast 10 mg tablet  Commonly known as: Singulair  Medication Adjustments for Surgery: Take morning of surgery with sip of water, no other fluids     omeprazole 40 mg DR capsule  Commonly known as: PriLOSEC  Medication Adjustments for Surgery: Take morning of surgery with sip of water, no other fluids     Prostate Health 160-100-100 mg-unit-mcg tablet  Generic drug: saw-vit E-sod ayana-lyc-beta-pyg  Medication Adjustments for Surgery: Stop 7 days before surgery     tamsulosin 0.4 mg 24 hr capsule  Commonly known as: Flomax  Medication Adjustments for Surgery: Take morning of surgery with sip of water, no other fluids                        PRE-OPERATIVE INSTRUCTIONS    You will receive notification one business day prior to your surgery to confirm your arrival time and additional information. It is important that you answer your phone and/or check your messages during this time.    Please enter the building through the Outpatient entrance. Take the elevator off the lobby to the 2nd floor and check in at the Outpatient Surgery desk    INSTRUCTIONS:  Talk to your surgeon for instructions if you should stop your aspirin, blood thinner, or diabetes medicines.  DO NOT take any multivitamins or over the counter supplements for 7-10 days before surgery.  If not  being admitted, you must have an adult immediately available to drive you home after surgery. We also highly recommend you have someone stay with you for the entire day and night of your surgery.  For children having surgery, a parent or legal guardian must accompany them to the surgery center. If this is not possible, please call [phone] to make additional arrangements.  For adults who are unable to consent or make medical decisions for themselves, a legal guardian or Power of  must accompany them to the surgery center. If this is not possible, please call [phone] to make additional arrangements.  Wear comfortable, loose fitting clothing.  All jewelry and piercings must be removed. If you are unable to remove an item or have a dermal piercing, please be sure to tell the nurse when you arrive for surgery.  Nail polish and make-up must be removed.  Avoid smoking or consuming alcohol for 24 hours before surgery.  To help prevent infection, please take a shower/bath and wash your hair the night before and/or morning of surgery.    Additional instructions about eating and drinking before surgery:  Do not eat any solid foods after midnight. Milk, nutritional drinks/supplements, and infant formula are considered solid foods.  You may drink up to 12 oz. of clear liquids up to 2 hours before your arrival time for surgery, unless directed otherwise by your surgeon. Clear liquids include water, non-carbonated sports drinks (Gatorade), black tea or coffee (no creamers) and breast milk.    If you received a blue folder, please review additional information provided inside the folder regarding additional preparation.     If you have any questions or concerns, please call Pre-Admission Testing at [phone].

## 2024-02-17 LAB — STAPHYLOCOCCUS SPEC CULT: NORMAL

## 2024-02-20 PROBLEM — M54.40 BACK PAIN OF LUMBAR REGION WITH SCIATICA: Status: ACTIVE | Noted: 2024-02-20

## 2024-02-21 ENCOUNTER — HOSPITAL ENCOUNTER (OUTPATIENT)
Facility: HOSPITAL | Age: 79
Discharge: HOME | End: 2024-02-22
Attending: ORTHOPAEDIC SURGERY | Admitting: ORTHOPAEDIC SURGERY
Payer: MEDICARE

## 2024-02-21 ENCOUNTER — ANESTHESIA (OUTPATIENT)
Dept: OPERATING ROOM | Facility: HOSPITAL | Age: 79
End: 2024-02-21
Payer: MEDICARE

## 2024-02-21 ENCOUNTER — APPOINTMENT (OUTPATIENT)
Dept: RADIOLOGY | Facility: HOSPITAL | Age: 79
End: 2024-02-21
Payer: MEDICARE

## 2024-02-21 ENCOUNTER — ANESTHESIA EVENT (OUTPATIENT)
Dept: OPERATING ROOM | Facility: HOSPITAL | Age: 79
End: 2024-02-21
Payer: MEDICARE

## 2024-02-21 DIAGNOSIS — M54.40 BACK PAIN OF LUMBAR REGION WITH SCIATICA: Primary | ICD-10-CM

## 2024-02-21 PROBLEM — M48.00 SPINAL STENOSIS: Status: ACTIVE | Noted: 2024-02-21

## 2024-02-21 PROCEDURE — 7100000001 HC RECOVERY ROOM TIME - INITIAL BASE CHARGE: Performed by: ORTHOPAEDIC SURGERY

## 2024-02-21 PROCEDURE — 2720000007 HC OR 272 NO HCPCS: Performed by: ORTHOPAEDIC SURGERY

## 2024-02-21 PROCEDURE — 3600000004 HC OR TIME - INITIAL BASE CHARGE - PROCEDURE LEVEL FOUR: Performed by: ORTHOPAEDIC SURGERY

## 2024-02-21 PROCEDURE — G0378 HOSPITAL OBSERVATION PER HR: HCPCS

## 2024-02-21 PROCEDURE — 2500000001 HC RX 250 WO HCPCS SELF ADMINISTERED DRUGS (ALT 637 FOR MEDICARE OP): Performed by: INTERNAL MEDICINE

## 2024-02-21 PROCEDURE — C1889 IMPLANT/INSERT DEVICE, NOC: HCPCS | Performed by: ORTHOPAEDIC SURGERY

## 2024-02-21 PROCEDURE — 2500000001 HC RX 250 WO HCPCS SELF ADMINISTERED DRUGS (ALT 637 FOR MEDICARE OP): Performed by: PHYSICIAN ASSISTANT

## 2024-02-21 PROCEDURE — 3700000002 HC GENERAL ANESTHESIA TIME - EACH INCREMENTAL 1 MINUTE: Performed by: ORTHOPAEDIC SURGERY

## 2024-02-21 PROCEDURE — 22633 ARTHRD CMBN 1NTRSPC LUMBAR: CPT | Performed by: ORTHOPAEDIC SURGERY

## 2024-02-21 PROCEDURE — 22840 INSERT SPINE FIXATION DEVICE: CPT | Performed by: PHYSICIAN ASSISTANT

## 2024-02-21 PROCEDURE — 99222 1ST HOSP IP/OBS MODERATE 55: CPT | Performed by: INTERNAL MEDICINE

## 2024-02-21 PROCEDURE — 63012 REMOVE LAMINA/FACETS LUMBAR: CPT | Performed by: PHYSICIAN ASSISTANT

## 2024-02-21 PROCEDURE — 2500000004 HC RX 250 GENERAL PHARMACY W/ HCPCS (ALT 636 FOR OP/ED): Performed by: INTERNAL MEDICINE

## 2024-02-21 PROCEDURE — 7100000002 HC RECOVERY ROOM TIME - EACH INCREMENTAL 1 MINUTE: Performed by: ORTHOPAEDIC SURGERY

## 2024-02-21 PROCEDURE — 64635 DESTROY LUMB/SAC FACET JNT: CPT | Performed by: ORTHOPAEDIC SURGERY

## 2024-02-21 PROCEDURE — 22853 INSJ BIOMECHANICAL DEVICE: CPT | Performed by: ORTHOPAEDIC SURGERY

## 2024-02-21 PROCEDURE — C1713 ANCHOR/SCREW BN/BN,TIS/BN: HCPCS | Performed by: ORTHOPAEDIC SURGERY

## 2024-02-21 PROCEDURE — 2500000002 HC RX 250 W HCPCS SELF ADMINISTERED DRUGS (ALT 637 FOR MEDICARE OP, ALT 636 FOR OP/ED): Performed by: PHYSICIAN ASSISTANT

## 2024-02-21 PROCEDURE — 2500000004 HC RX 250 GENERAL PHARMACY W/ HCPCS (ALT 636 FOR OP/ED): Performed by: PHYSICIAN ASSISTANT

## 2024-02-21 PROCEDURE — 7100000011 HC EXTENDED STAY RECOVERY HOURLY - NURSING UNIT

## 2024-02-21 PROCEDURE — 2780000003 HC OR 278 NO HCPCS: Performed by: ORTHOPAEDIC SURGERY

## 2024-02-21 PROCEDURE — 3700000001 HC GENERAL ANESTHESIA TIME - INITIAL BASE CHARGE: Performed by: ORTHOPAEDIC SURGERY

## 2024-02-21 PROCEDURE — 63012 REMOVE LAMINA/FACETS LUMBAR: CPT | Performed by: ORTHOPAEDIC SURGERY

## 2024-02-21 PROCEDURE — 22633 ARTHRD CMBN 1NTRSPC LUMBAR: CPT | Performed by: PHYSICIAN ASSISTANT

## 2024-02-21 PROCEDURE — 2500000004 HC RX 250 GENERAL PHARMACY W/ HCPCS (ALT 636 FOR OP/ED): Performed by: NURSE ANESTHETIST, CERTIFIED REGISTERED

## 2024-02-21 PROCEDURE — 2500000002 HC RX 250 W HCPCS SELF ADMINISTERED DRUGS (ALT 637 FOR MEDICARE OP, ALT 636 FOR OP/ED): Performed by: INTERNAL MEDICINE

## 2024-02-21 PROCEDURE — 2500000005 HC RX 250 GENERAL PHARMACY W/O HCPCS: Performed by: NURSE ANESTHETIST, CERTIFIED REGISTERED

## 2024-02-21 PROCEDURE — 3600000009 HC OR TIME - EACH INCREMENTAL 1 MINUTE - PROCEDURE LEVEL FOUR: Performed by: ORTHOPAEDIC SURGERY

## 2024-02-21 PROCEDURE — 22840 INSERT SPINE FIXATION DEVICE: CPT | Performed by: ORTHOPAEDIC SURGERY

## 2024-02-21 PROCEDURE — 22853 INSJ BIOMECHANICAL DEVICE: CPT | Performed by: PHYSICIAN ASSISTANT

## 2024-02-21 PROCEDURE — 2500000005 HC RX 250 GENERAL PHARMACY W/O HCPCS: Performed by: PHYSICIAN ASSISTANT

## 2024-02-21 PROCEDURE — 63052 LAM FACETC/FRMT ARTHRD LUM 1: CPT | Performed by: PHYSICIAN ASSISTANT

## 2024-02-21 DEVICE — SCREW, RELINE LOCK, 5.5MM OPEN TULIP: Type: IMPLANTABLE DEVICE | Site: SPINE LUMBAR | Status: FUNCTIONAL

## 2024-02-21 DEVICE — SCREW, RELINE MAS RED, 7.5X50MM POLY 2C: Type: IMPLANTABLE DEVICE | Site: SPINE LUMBAR | Status: FUNCTIONAL

## 2024-02-21 DEVICE — BONE GRAFT SUBSTITUTE - SILICATE SUBSTITUTED CALCIUM PHOSPHATE - 7.5ML PACK SIZE
Type: IMPLANTABLE DEVICE | Site: SPINE LUMBAR | Status: FUNCTIONAL
Brand: ACTIFUSE MIS

## 2024-02-21 DEVICE — K-WIRE, RELINE MAS NITINOL, BLUNT THREAD: Type: IMPLANTABLE DEVICE | Site: SPINE LUMBAR | Status: NON-FUNCTIONAL

## 2024-02-21 DEVICE — IMPLANTABLE DEVICE: Type: IMPLANTABLE DEVICE | Site: SPINE LUMBAR | Status: FUNCTIONAL

## 2024-02-21 DEVICE — ROD, RELINE MAS, 5.5 X 40MM, LORDOTIC: Type: IMPLANTABLE DEVICE | Site: SPINE LUMBAR | Status: FUNCTIONAL

## 2024-02-21 RX ORDER — ONDANSETRON HYDROCHLORIDE 2 MG/ML
4 INJECTION, SOLUTION INTRAVENOUS EVERY 8 HOURS PRN
Status: DISCONTINUED | OUTPATIENT
Start: 2024-02-21 | End: 2024-02-22 | Stop reason: HOSPADM

## 2024-02-21 RX ORDER — CEFAZOLIN SODIUM 2 G/100ML
2 INJECTION, SOLUTION INTRAVENOUS ONCE
Status: COMPLETED | OUTPATIENT
Start: 2024-02-21 | End: 2024-02-21

## 2024-02-21 RX ORDER — HYDROMORPHONE HYDROCHLORIDE 1 MG/ML
INJECTION, SOLUTION INTRAMUSCULAR; INTRAVENOUS; SUBCUTANEOUS AS NEEDED
Status: DISCONTINUED | OUTPATIENT
Start: 2024-02-21 | End: 2024-02-21

## 2024-02-21 RX ORDER — FAMOTIDINE 20 MG/1
40 TABLET, FILM COATED ORAL DAILY
Status: DISCONTINUED | OUTPATIENT
Start: 2024-02-21 | End: 2024-02-22 | Stop reason: HOSPADM

## 2024-02-21 RX ORDER — NALOXONE HYDROCHLORIDE 0.4 MG/ML
0.2 INJECTION, SOLUTION INTRAMUSCULAR; INTRAVENOUS; SUBCUTANEOUS EVERY 5 MIN PRN
Status: DISCONTINUED | OUTPATIENT
Start: 2024-02-21 | End: 2024-02-22 | Stop reason: HOSPADM

## 2024-02-21 RX ORDER — ALLOPURINOL 100 MG/1
100 TABLET ORAL DAILY
Status: DISCONTINUED | OUTPATIENT
Start: 2024-02-21 | End: 2024-02-22 | Stop reason: HOSPADM

## 2024-02-21 RX ORDER — TRANEXAMIC ACID 650 MG/1
1950 TABLET ORAL ONCE
Status: COMPLETED | OUTPATIENT
Start: 2024-02-21 | End: 2024-02-21

## 2024-02-21 RX ORDER — OXYCODONE HYDROCHLORIDE 5 MG/1
5 TABLET ORAL EVERY 4 HOURS PRN
Status: DISCONTINUED | OUTPATIENT
Start: 2024-02-21 | End: 2024-02-21 | Stop reason: HOSPADM

## 2024-02-21 RX ORDER — ONDANSETRON HYDROCHLORIDE 2 MG/ML
4 INJECTION, SOLUTION INTRAVENOUS ONCE AS NEEDED
Status: DISCONTINUED | OUTPATIENT
Start: 2024-02-21 | End: 2024-02-21 | Stop reason: HOSPADM

## 2024-02-21 RX ORDER — LIDOCAINE HYDROCHLORIDE 10 MG/ML
0.1 INJECTION, SOLUTION EPIDURAL; INFILTRATION; INTRACAUDAL; PERINEURAL ONCE
Status: DISCONTINUED | OUTPATIENT
Start: 2024-02-21 | End: 2024-02-21 | Stop reason: HOSPADM

## 2024-02-21 RX ORDER — ROCURONIUM BROMIDE 10 MG/ML
INJECTION, SOLUTION INTRAVENOUS AS NEEDED
Status: DISCONTINUED | OUTPATIENT
Start: 2024-02-21 | End: 2024-02-21

## 2024-02-21 RX ORDER — OXYCODONE HYDROCHLORIDE 5 MG/1
5 TABLET ORAL EVERY 4 HOURS PRN
Status: DISCONTINUED | OUTPATIENT
Start: 2024-02-21 | End: 2024-02-22 | Stop reason: HOSPADM

## 2024-02-21 RX ORDER — EZETIMIBE 10 MG/1
10 TABLET ORAL DAILY
Status: DISCONTINUED | OUTPATIENT
Start: 2024-02-21 | End: 2024-02-22 | Stop reason: HOSPADM

## 2024-02-21 RX ORDER — FOLIC ACID 1 MG/1
1 TABLET ORAL DAILY
Status: DISCONTINUED | OUTPATIENT
Start: 2024-02-21 | End: 2024-02-22 | Stop reason: HOSPADM

## 2024-02-21 RX ORDER — MEPERIDINE HYDROCHLORIDE 50 MG/ML
12.5 INJECTION INTRAMUSCULAR; INTRAVENOUS; SUBCUTANEOUS EVERY 10 MIN PRN
Status: DISCONTINUED | OUTPATIENT
Start: 2024-02-21 | End: 2024-02-21 | Stop reason: HOSPADM

## 2024-02-21 RX ORDER — MIDAZOLAM HYDROCHLORIDE 1 MG/ML
INJECTION, SOLUTION INTRAMUSCULAR; INTRAVENOUS AS NEEDED
Status: DISCONTINUED | OUTPATIENT
Start: 2024-02-21 | End: 2024-02-21

## 2024-02-21 RX ORDER — TAMSULOSIN HYDROCHLORIDE 0.4 MG/1
0.4 CAPSULE ORAL DAILY
Status: DISCONTINUED | OUTPATIENT
Start: 2024-02-21 | End: 2024-02-22 | Stop reason: HOSPADM

## 2024-02-21 RX ORDER — GABAPENTIN 300 MG/1
300 CAPSULE ORAL 3 TIMES DAILY
Status: DISCONTINUED | OUTPATIENT
Start: 2024-02-21 | End: 2024-02-22 | Stop reason: HOSPADM

## 2024-02-21 RX ORDER — DEXTROSE 50 % IN WATER (D50W) INTRAVENOUS SYRINGE
25
Status: DISCONTINUED | OUTPATIENT
Start: 2024-02-21 | End: 2024-02-22 | Stop reason: HOSPADM

## 2024-02-21 RX ORDER — OXYCODONE HYDROCHLORIDE 10 MG/1
10 TABLET ORAL EVERY 4 HOURS PRN
Status: DISCONTINUED | OUTPATIENT
Start: 2024-02-21 | End: 2024-02-22 | Stop reason: HOSPADM

## 2024-02-21 RX ORDER — OXYCODONE HYDROCHLORIDE 10 MG/1
10 TABLET ORAL EVERY 4 HOURS PRN
Status: DISCONTINUED | OUTPATIENT
Start: 2024-02-21 | End: 2024-02-21 | Stop reason: HOSPADM

## 2024-02-21 RX ORDER — FAMOTIDINE 10 MG/ML
20 INJECTION INTRAVENOUS ONCE
Status: DISCONTINUED | OUTPATIENT
Start: 2024-02-21 | End: 2024-02-21 | Stop reason: HOSPADM

## 2024-02-21 RX ORDER — PHENYLEPHRINE HCL IN 0.9% NACL 1 MG/10 ML
SYRINGE (ML) INTRAVENOUS AS NEEDED
Status: DISCONTINUED | OUTPATIENT
Start: 2024-02-21 | End: 2024-02-21

## 2024-02-21 RX ORDER — LISINOPRIL 5 MG/1
5 TABLET ORAL DAILY
Status: DISCONTINUED | OUTPATIENT
Start: 2024-02-21 | End: 2024-02-22 | Stop reason: HOSPADM

## 2024-02-21 RX ORDER — POLYETHYLENE GLYCOL 3350 17 G/17G
17 POWDER, FOR SOLUTION ORAL DAILY
Status: DISCONTINUED | OUTPATIENT
Start: 2024-02-21 | End: 2024-02-22 | Stop reason: HOSPADM

## 2024-02-21 RX ORDER — DEXAMETHASONE SODIUM PHOSPHATE 4 MG/ML
INJECTION, SOLUTION INTRA-ARTICULAR; INTRALESIONAL; INTRAMUSCULAR; INTRAVENOUS; SOFT TISSUE AS NEEDED
Status: DISCONTINUED | OUTPATIENT
Start: 2024-02-21 | End: 2024-02-21

## 2024-02-21 RX ORDER — NORETHINDRONE AND ETHINYL ESTRADIOL 0.5-0.035
KIT ORAL AS NEEDED
Status: DISCONTINUED | OUTPATIENT
Start: 2024-02-21 | End: 2024-02-21

## 2024-02-21 RX ORDER — HYDRALAZINE HYDROCHLORIDE 20 MG/ML
5 INJECTION INTRAMUSCULAR; INTRAVENOUS EVERY 30 MIN PRN
Status: DISCONTINUED | OUTPATIENT
Start: 2024-02-21 | End: 2024-02-21 | Stop reason: HOSPADM

## 2024-02-21 RX ORDER — LABETALOL HYDROCHLORIDE 5 MG/ML
5 INJECTION, SOLUTION INTRAVENOUS ONCE AS NEEDED
Status: DISCONTINUED | OUTPATIENT
Start: 2024-02-21 | End: 2024-02-21 | Stop reason: HOSPADM

## 2024-02-21 RX ORDER — CHOLECALCIFEROL (VITAMIN D3) 25 MCG
4000 TABLET ORAL DAILY
Status: DISCONTINUED | OUTPATIENT
Start: 2024-02-21 | End: 2024-02-22 | Stop reason: HOSPADM

## 2024-02-21 RX ORDER — CLOPIDOGREL BISULFATE 75 MG/1
75 TABLET ORAL DAILY
Status: DISCONTINUED | OUTPATIENT
Start: 2024-02-21 | End: 2024-02-22 | Stop reason: HOSPADM

## 2024-02-21 RX ORDER — SODIUM CHLORIDE 9 MG/ML
100 INJECTION, SOLUTION INTRAVENOUS CONTINUOUS
Status: DISCONTINUED | OUTPATIENT
Start: 2024-02-21 | End: 2024-02-22 | Stop reason: HOSPADM

## 2024-02-21 RX ORDER — DIPHENHYDRAMINE HYDROCHLORIDE 50 MG/ML
12.5 INJECTION INTRAMUSCULAR; INTRAVENOUS ONCE AS NEEDED
Status: DISCONTINUED | OUTPATIENT
Start: 2024-02-21 | End: 2024-02-21 | Stop reason: HOSPADM

## 2024-02-21 RX ORDER — TRANEXAMIC ACID 650 MG/1
1950 TABLET ORAL ONCE
Status: CANCELLED | OUTPATIENT
Start: 2024-02-21 | End: 2024-02-21

## 2024-02-21 RX ORDER — ACETAMINOPHEN 325 MG/1
650 TABLET ORAL ONCE
Status: COMPLETED | OUTPATIENT
Start: 2024-02-21 | End: 2024-02-21

## 2024-02-21 RX ORDER — OXYCODONE HYDROCHLORIDE 5 MG/1
2.5 TABLET ORAL EVERY 4 HOURS PRN
Status: DISCONTINUED | OUTPATIENT
Start: 2024-02-21 | End: 2024-02-22 | Stop reason: HOSPADM

## 2024-02-21 RX ORDER — FENTANYL CITRATE 50 UG/ML
INJECTION, SOLUTION INTRAMUSCULAR; INTRAVENOUS AS NEEDED
Status: DISCONTINUED | OUTPATIENT
Start: 2024-02-21 | End: 2024-02-21

## 2024-02-21 RX ORDER — ALBUTEROL SULFATE 0.83 MG/ML
2.5 SOLUTION RESPIRATORY (INHALATION) ONCE AS NEEDED
Status: DISCONTINUED | OUTPATIENT
Start: 2024-02-21 | End: 2024-02-21 | Stop reason: HOSPADM

## 2024-02-21 RX ORDER — ACETAMINOPHEN 325 MG/1
650 TABLET ORAL EVERY 6 HOURS
Status: DISCONTINUED | OUTPATIENT
Start: 2024-02-21 | End: 2024-02-22 | Stop reason: HOSPADM

## 2024-02-21 RX ORDER — ONDANSETRON HYDROCHLORIDE 2 MG/ML
INJECTION, SOLUTION INTRAVENOUS AS NEEDED
Status: DISCONTINUED | OUTPATIENT
Start: 2024-02-21 | End: 2024-02-21

## 2024-02-21 RX ORDER — HYDROMORPHONE HYDROCHLORIDE 1 MG/ML
0.5 INJECTION, SOLUTION INTRAMUSCULAR; INTRAVENOUS; SUBCUTANEOUS EVERY 5 MIN PRN
Status: DISCONTINUED | OUTPATIENT
Start: 2024-02-21 | End: 2024-02-21 | Stop reason: HOSPADM

## 2024-02-21 RX ORDER — SODIUM CHLORIDE, SODIUM LACTATE, POTASSIUM CHLORIDE, CALCIUM CHLORIDE 600; 310; 30; 20 MG/100ML; MG/100ML; MG/100ML; MG/100ML
100 INJECTION, SOLUTION INTRAVENOUS CONTINUOUS
Status: DISCONTINUED | OUTPATIENT
Start: 2024-02-21 | End: 2024-02-21 | Stop reason: HOSPADM

## 2024-02-21 RX ORDER — PHENYLEPHRINE 10 MG/250 ML(40 MCG/ML)IN 0.9 % SOD.CHLORIDE INTRAVENOUS
CONTINUOUS PRN
Status: DISCONTINUED | OUTPATIENT
Start: 2024-02-21 | End: 2024-02-21

## 2024-02-21 RX ORDER — MONTELUKAST SODIUM 10 MG/1
10 TABLET ORAL NIGHTLY
Status: DISCONTINUED | OUTPATIENT
Start: 2024-02-21 | End: 2024-02-22 | Stop reason: HOSPADM

## 2024-02-21 RX ORDER — HYDROMORPHONE HYDROCHLORIDE 1 MG/ML
0.2 INJECTION, SOLUTION INTRAMUSCULAR; INTRAVENOUS; SUBCUTANEOUS EVERY 5 MIN PRN
Status: DISCONTINUED | OUTPATIENT
Start: 2024-02-21 | End: 2024-02-21 | Stop reason: HOSPADM

## 2024-02-21 RX ORDER — SODIUM CHLORIDE, SODIUM LACTATE, POTASSIUM CHLORIDE, CALCIUM CHLORIDE 600; 310; 30; 20 MG/100ML; MG/100ML; MG/100ML; MG/100ML
100 INJECTION, SOLUTION INTRAVENOUS CONTINUOUS
Status: DISCONTINUED | OUTPATIENT
Start: 2024-02-22 | End: 2024-02-21

## 2024-02-21 RX ORDER — METOCLOPRAMIDE HYDROCHLORIDE 5 MG/ML
10 INJECTION INTRAMUSCULAR; INTRAVENOUS ONCE AS NEEDED
Status: DISCONTINUED | OUTPATIENT
Start: 2024-02-21 | End: 2024-02-21 | Stop reason: HOSPADM

## 2024-02-21 RX ORDER — SUCCINYLCHOLINE CHLORIDE 100 MG/5ML
SYRINGE (ML) INTRAVENOUS AS NEEDED
Status: DISCONTINUED | OUTPATIENT
Start: 2024-02-21 | End: 2024-02-21

## 2024-02-21 RX ORDER — ONDANSETRON 4 MG/1
4 TABLET, ORALLY DISINTEGRATING ORAL EVERY 8 HOURS PRN
Status: DISCONTINUED | OUTPATIENT
Start: 2024-02-21 | End: 2024-02-22 | Stop reason: HOSPADM

## 2024-02-21 RX ORDER — CEFAZOLIN SODIUM 2 G/100ML
2 INJECTION, SOLUTION INTRAVENOUS EVERY 8 HOURS
Status: COMPLETED | OUTPATIENT
Start: 2024-02-21 | End: 2024-02-22

## 2024-02-21 RX ORDER — PROPOFOL 10 MG/ML
INJECTION, EMULSION INTRAVENOUS AS NEEDED
Status: DISCONTINUED | OUTPATIENT
Start: 2024-02-21 | End: 2024-02-21

## 2024-02-21 RX ORDER — LANOLIN ALCOHOL/MO/W.PET/CERES
400 CREAM (GRAM) TOPICAL DAILY
Status: DISCONTINUED | OUTPATIENT
Start: 2024-02-21 | End: 2024-02-22 | Stop reason: HOSPADM

## 2024-02-21 RX ORDER — CELECOXIB 200 MG/1
200 CAPSULE ORAL ONCE
Status: COMPLETED | OUTPATIENT
Start: 2024-02-21 | End: 2024-02-21

## 2024-02-21 RX ORDER — DEXTROSE MONOHYDRATE 100 MG/ML
0.3 INJECTION, SOLUTION INTRAVENOUS ONCE AS NEEDED
Status: DISCONTINUED | OUTPATIENT
Start: 2024-02-21 | End: 2024-02-22 | Stop reason: HOSPADM

## 2024-02-21 RX ORDER — PANTOPRAZOLE SODIUM 40 MG/1
40 TABLET, DELAYED RELEASE ORAL
Status: DISCONTINUED | OUTPATIENT
Start: 2024-02-22 | End: 2024-02-22 | Stop reason: HOSPADM

## 2024-02-21 RX ORDER — PROPOFOL 10 MG/ML
INJECTION, EMULSION INTRAVENOUS CONTINUOUS PRN
Status: DISCONTINUED | OUTPATIENT
Start: 2024-02-21 | End: 2024-02-21

## 2024-02-21 RX ORDER — METOPROLOL SUCCINATE 25 MG/1
25 TABLET, EXTENDED RELEASE ORAL DAILY
Status: DISCONTINUED | OUTPATIENT
Start: 2024-02-21 | End: 2024-02-22 | Stop reason: HOSPADM

## 2024-02-21 RX ORDER — LIDOCAINE HYDROCHLORIDE 20 MG/ML
INJECTION, SOLUTION EPIDURAL; INFILTRATION; INTRACAUDAL; PERINEURAL AS NEEDED
Status: DISCONTINUED | OUTPATIENT
Start: 2024-02-21 | End: 2024-02-21

## 2024-02-21 RX ORDER — CYCLOBENZAPRINE HCL 10 MG
10 TABLET ORAL 3 TIMES DAILY PRN
Status: DISCONTINUED | OUTPATIENT
Start: 2024-02-21 | End: 2024-02-22 | Stop reason: HOSPADM

## 2024-02-21 RX ORDER — IPRATROPIUM BROMIDE AND ALBUTEROL SULFATE 2.5; .5 MG/3ML; MG/3ML
3 SOLUTION RESPIRATORY (INHALATION) 4 TIMES DAILY PRN
Status: DISCONTINUED | OUTPATIENT
Start: 2024-02-21 | End: 2024-02-22 | Stop reason: HOSPADM

## 2024-02-21 RX ORDER — HYDROCHLOROTHIAZIDE 12.5 MG/1
12.5 TABLET ORAL
Status: DISCONTINUED | OUTPATIENT
Start: 2024-02-22 | End: 2024-02-22 | Stop reason: HOSPADM

## 2024-02-21 RX ORDER — ACETAMINOPHEN 325 MG/1
975 TABLET ORAL ONCE
Status: DISCONTINUED | OUTPATIENT
Start: 2024-02-21 | End: 2024-02-21 | Stop reason: HOSPADM

## 2024-02-21 RX ADMIN — Medication 200 MCG: at 08:08

## 2024-02-21 RX ADMIN — POVIDONE-IODINE 1 APPLICATION: 5 SOLUTION TOPICAL at 06:27

## 2024-02-21 RX ADMIN — SODIUM CHLORIDE, POTASSIUM CHLORIDE, SODIUM LACTATE AND CALCIUM CHLORIDE 100 ML/HR: 600; 310; 30; 20 INJECTION, SOLUTION INTRAVENOUS at 06:27

## 2024-02-21 RX ADMIN — CEFAZOLIN SODIUM 2 G: 2 INJECTION, SOLUTION INTRAVENOUS at 08:09

## 2024-02-21 RX ADMIN — PROPOFOL 200 MG: 10 INJECTION, EMULSION INTRAVENOUS at 08:00

## 2024-02-21 RX ADMIN — Medication 200 MCG: at 08:30

## 2024-02-21 RX ADMIN — LISINOPRIL 5 MG: 5 TABLET ORAL at 20:44

## 2024-02-21 RX ADMIN — SODIUM CHLORIDE 100 ML/HR: 9 INJECTION, SOLUTION INTRAVENOUS at 23:43

## 2024-02-21 RX ADMIN — SODIUM CHLORIDE 100 ML/HR: 9 INJECTION, SOLUTION INTRAVENOUS at 14:06

## 2024-02-21 RX ADMIN — GABAPENTIN 300 MG: 300 CAPSULE ORAL at 14:05

## 2024-02-21 RX ADMIN — ROCURONIUM BROMIDE 20 MG: 10 INJECTION INTRAVENOUS at 08:53

## 2024-02-21 RX ADMIN — Medication 200 MCG: at 08:02

## 2024-02-21 RX ADMIN — HYDROMORPHONE HYDROCHLORIDE 0.5 MG: 1 INJECTION, SOLUTION INTRAMUSCULAR; INTRAVENOUS; SUBCUTANEOUS at 10:22

## 2024-02-21 RX ADMIN — EPHEDRINE SULFATE 5 MG: 50 INJECTION, SOLUTION INTRAVENOUS at 08:51

## 2024-02-21 RX ADMIN — FAMOTIDINE 40 MG: 20 TABLET, FILM COATED ORAL at 20:45

## 2024-02-21 RX ADMIN — ACETAMINOPHEN 650 MG: 325 TABLET ORAL at 20:43

## 2024-02-21 RX ADMIN — Medication 2 L/MIN: at 13:15

## 2024-02-21 RX ADMIN — DEXAMETHASONE SODIUM PHOSPHATE 4 MG: 4 INJECTION, SOLUTION INTRAMUSCULAR; INTRAVENOUS at 10:38

## 2024-02-21 RX ADMIN — ONDANSETRON 4 MG: 2 INJECTION INTRAMUSCULAR; INTRAVENOUS at 10:38

## 2024-02-21 RX ADMIN — CEFAZOLIN SODIUM 2 G: 2 INJECTION, SOLUTION INTRAVENOUS at 23:43

## 2024-02-21 RX ADMIN — GABAPENTIN 300 MG: 300 CAPSULE ORAL at 20:44

## 2024-02-21 RX ADMIN — FOLIC ACID 1 MG: 1 TABLET ORAL at 20:45

## 2024-02-21 RX ADMIN — CELECOXIB 200 MG: 200 CAPSULE ORAL at 06:26

## 2024-02-21 RX ADMIN — Medication 300 MCG: at 07:11

## 2024-02-21 RX ADMIN — METOPROLOL SUCCINATE 25 MG: 25 TABLET, EXTENDED RELEASE ORAL at 20:46

## 2024-02-21 RX ADMIN — MIDAZOLAM 1 MG: 1 INJECTION INTRAMUSCULAR; INTRAVENOUS at 08:00

## 2024-02-21 RX ADMIN — Medication 400 MG: at 20:45

## 2024-02-21 RX ADMIN — EPHEDRINE SULFATE 10 MG: 50 INJECTION, SOLUTION INTRAVENOUS at 08:38

## 2024-02-21 RX ADMIN — CEFAZOLIN SODIUM 2 G: 2 INJECTION, SOLUTION INTRAVENOUS at 16:14

## 2024-02-21 RX ADMIN — PROPOFOL 100 MCG/KG/MIN: 10 INJECTION, EMULSION INTRAVENOUS at 08:05

## 2024-02-21 RX ADMIN — TAMSULOSIN HYDROCHLORIDE 0.4 MG: 0.4 CAPSULE ORAL at 20:44

## 2024-02-21 RX ADMIN — EPHEDRINE SULFATE 10 MG: 50 INJECTION, SOLUTION INTRAVENOUS at 09:16

## 2024-02-21 RX ADMIN — SODIUM CHLORIDE, POTASSIUM CHLORIDE, SODIUM LACTATE AND CALCIUM CHLORIDE: 600; 310; 30; 20 INJECTION, SOLUTION INTRAVENOUS at 07:54

## 2024-02-21 RX ADMIN — ALLOPURINOL 100 MG: 100 TABLET ORAL at 20:45

## 2024-02-21 RX ADMIN — Medication 0.8 MCG/KG/MIN: at 09:12

## 2024-02-21 RX ADMIN — ACETAMINOPHEN 650 MG: 325 TABLET ORAL at 14:05

## 2024-02-21 RX ADMIN — ROCURONIUM BROMIDE 10 MG: 10 INJECTION INTRAVENOUS at 08:58

## 2024-02-21 RX ADMIN — Medication 100 MG: at 08:00

## 2024-02-21 RX ADMIN — FENTANYL CITRATE 50 MCG: 50 INJECTION, SOLUTION INTRAMUSCULAR; INTRAVENOUS at 08:00

## 2024-02-21 RX ADMIN — ACETAMINOPHEN 650 MG: 325 TABLET ORAL at 06:27

## 2024-02-21 RX ADMIN — FENTANYL CITRATE 50 MCG: 50 INJECTION, SOLUTION INTRAMUSCULAR; INTRAVENOUS at 10:16

## 2024-02-21 RX ADMIN — ROCURONIUM BROMIDE 10 MG: 10 INJECTION INTRAVENOUS at 08:00

## 2024-02-21 RX ADMIN — EZETIMIBE 10 MG: 10 TABLET ORAL at 20:46

## 2024-02-21 RX ADMIN — TRANEXAMIC ACID 1950 MG: 650 TABLET ORAL at 06:26

## 2024-02-21 RX ADMIN — Medication 4000 UNITS: at 20:47

## 2024-02-21 RX ADMIN — Medication 300 MCG: at 08:23

## 2024-02-21 RX ADMIN — CYCLOBENZAPRINE 10 MG: 10 TABLET, FILM COATED ORAL at 20:46

## 2024-02-21 RX ADMIN — MONTELUKAST 10 MG: 10 TABLET, FILM COATED ORAL at 20:45

## 2024-02-21 RX ADMIN — LIDOCAINE HYDROCHLORIDE 80 MG: 20 INJECTION, SOLUTION EPIDURAL; INFILTRATION; INTRACAUDAL; PERINEURAL at 08:00

## 2024-02-21 SDOH — SOCIAL STABILITY: SOCIAL INSECURITY: WERE YOU ABLE TO COMPLETE ALL THE BEHAVIORAL HEALTH SCREENINGS?: YES

## 2024-02-21 SDOH — SOCIAL STABILITY: SOCIAL INSECURITY: ABUSE: ADULT

## 2024-02-21 SDOH — SOCIAL STABILITY: SOCIAL INSECURITY: ARE YOU OR HAVE YOU BEEN THREATENED OR ABUSED PHYSICALLY, EMOTIONALLY, OR SEXUALLY BY ANYONE?: NO

## 2024-02-21 SDOH — SOCIAL STABILITY: SOCIAL INSECURITY: ARE THERE ANY APPARENT SIGNS OF INJURIES/BEHAVIORS THAT COULD BE RELATED TO ABUSE/NEGLECT?: NO

## 2024-02-21 SDOH — SOCIAL STABILITY: SOCIAL INSECURITY: DOES ANYONE TRY TO KEEP YOU FROM HAVING/CONTACTING OTHER FRIENDS OR DOING THINGS OUTSIDE YOUR HOME?: NO

## 2024-02-21 SDOH — SOCIAL STABILITY: SOCIAL INSECURITY: HAS ANYONE EVER THREATENED TO HURT YOUR FAMILY OR YOUR PETS?: NO

## 2024-02-21 SDOH — SOCIAL STABILITY: SOCIAL INSECURITY: HAVE YOU HAD THOUGHTS OF HARMING ANYONE ELSE?: NO

## 2024-02-21 SDOH — SOCIAL STABILITY: SOCIAL INSECURITY: DO YOU FEEL ANYONE HAS EXPLOITED OR TAKEN ADVANTAGE OF YOU FINANCIALLY OR OF YOUR PERSONAL PROPERTY?: NO

## 2024-02-21 SDOH — SOCIAL STABILITY: SOCIAL INSECURITY: DO YOU FEEL UNSAFE GOING BACK TO THE PLACE WHERE YOU ARE LIVING?: NO

## 2024-02-21 ASSESSMENT — ACTIVITIES OF DAILY LIVING (ADL)
PATIENT'S MEMORY ADEQUATE TO SAFELY COMPLETE DAILY ACTIVITIES?: YES
DRESSING YOURSELF: INDEPENDENT
LACK_OF_TRANSPORTATION: PATIENT DECLINED
FEEDING YOURSELF: INDEPENDENT
JUDGMENT_ADEQUATE_SAFELY_COMPLETE_DAILY_ACTIVITIES: YES
BATHING: INDEPENDENT
HEARING - RIGHT EAR: FUNCTIONAL
GROOMING: INDEPENDENT
WALKS IN HOME: INDEPENDENT
ADEQUATE_TO_COMPLETE_ADL: YES
HEARING - LEFT EAR: FUNCTIONAL
TOILETING: INDEPENDENT

## 2024-02-21 ASSESSMENT — LIFESTYLE VARIABLES
AUDIT-C TOTAL SCORE: 0
HOW OFTEN DO YOU HAVE A DRINK CONTAINING ALCOHOL: NEVER
SKIP TO QUESTIONS 9-10: 1
HOW MANY STANDARD DRINKS CONTAINING ALCOHOL DO YOU HAVE ON A TYPICAL DAY: PATIENT DOES NOT DRINK
HOW OFTEN DO YOU HAVE 6 OR MORE DRINKS ON ONE OCCASION: NEVER
AUDIT-C TOTAL SCORE: 0

## 2024-02-21 ASSESSMENT — COGNITIVE AND FUNCTIONAL STATUS - GENERAL
MOBILITY SCORE: 24
PATIENT BASELINE BEDBOUND: NO
DRESSING REGULAR LOWER BODY CLOTHING: A LITTLE
DRESSING REGULAR UPPER BODY CLOTHING: A LITTLE
STANDING UP FROM CHAIR USING ARMS: A LITTLE
DAILY ACTIVITIY SCORE: 24
MOBILITY SCORE: 17
DAILY ACTIVITIY SCORE: 19
PERSONAL GROOMING: A LITTLE
WALKING IN HOSPITAL ROOM: A LITTLE
CLIMB 3 TO 5 STEPS WITH RAILING: A LOT
TURNING FROM BACK TO SIDE WHILE IN FLAT BAD: A LITTLE
HELP NEEDED FOR BATHING: A LITTLE
MOVING TO AND FROM BED TO CHAIR: A LITTLE
TOILETING: A LITTLE
MOVING FROM LYING ON BACK TO SITTING ON SIDE OF FLAT BED WITH BEDRAILS: A LITTLE

## 2024-02-21 ASSESSMENT — PAIN - FUNCTIONAL ASSESSMENT
PAIN_FUNCTIONAL_ASSESSMENT: 0-10

## 2024-02-21 ASSESSMENT — PATIENT HEALTH QUESTIONNAIRE - PHQ9
2. FEELING DOWN, DEPRESSED OR HOPELESS: NOT AT ALL
SUM OF ALL RESPONSES TO PHQ9 QUESTIONS 1 & 2: 0
1. LITTLE INTEREST OR PLEASURE IN DOING THINGS: NOT AT ALL

## 2024-02-21 ASSESSMENT — PAIN SCALES - GENERAL
PAINLEVEL_OUTOF10: 0 - NO PAIN
PAINLEVEL_OUTOF10: 2
PAINLEVEL_OUTOF10: 0 - NO PAIN

## 2024-02-21 ASSESSMENT — COLUMBIA-SUICIDE SEVERITY RATING SCALE - C-SSRS
1. IN THE PAST MONTH, HAVE YOU WISHED YOU WERE DEAD OR WISHED YOU COULD GO TO SLEEP AND NOT WAKE UP?: NO
2. HAVE YOU ACTUALLY HAD ANY THOUGHTS OF KILLING YOURSELF?: NO
6. HAVE YOU EVER DONE ANYTHING, STARTED TO DO ANYTHING, OR PREPARED TO DO ANYTHING TO END YOUR LIFE?: NO

## 2024-02-21 ASSESSMENT — PAIN DESCRIPTION - LOCATION: LOCATION: BACK

## 2024-02-21 NOTE — CONSULTS
Inpatient consult to Medicine  Consult performed by: Poncho Moreno MD  Consult ordered by: Marc Segura PA-C  Reason for consult: Medical management          Reason For Consult  Medical management    History Of Present Illness  Russ Lagos is a 78-year-old male with past medical history of rheumatoid arthritis, CAD s/p PCI, chronic back pain, COPD, former smoker, hyperlipidemia, hypertension, neuropathy, PMR, restless leg syndrome presented for elective procedure with Dr. Norton.  Patient had L4-L5 revision with instrumentation.  Patient is being seen postoperatively for medical management.  Patient was holding his Plavix for a week.  He was on aspirin before for his stents but was upgraded to Plavix after a TIA.  Patient is on oxygen and has a catheter after the procedure.  Otherwise he is feeling well and has no complaints.     Past Medical History  He has a past medical history of Acid reflux, Allergy status to unspecified drugs, medicaments and biological substances, Arrhythmia, Chronic pain disorder, CKD (chronic kidney disease), COPD (chronic obstructive pulmonary disease) (CMS/[facility]), Coronary artery disease, Family history of prostate problems, Heart attack (CMS/[facility]), Hypertension, Lumbar disc disease, Old myocardial infarction, Peripheral neuropathy, Personal history of urinary calculi, PMR (polymyalgia rheumatica) (CMS/[facility]), Rheumatoid arthritis (CMS/[facility]), Sciatica, Syncope, TIA (transient ischemic attack), and Unspecified abdominal hernia without obstruction or gangrene.    Surgical History  He has a past surgical history that includes Other surgical history (12/04/2014); Other surgical history (11/17/2021); Other surgical history (11/17/2021); Other surgical history (11/17/2021); Other surgical history (11/17/2021); Other surgical history (11/17/2021); Other surgical history (11/17/2021); Other surgical history (11/17/2021); US guided biopsy prostate (06/13/2019); MR angio head wo IV contrast  (08/08/2023); MR angio neck wo IV contrast (08/08/2023); Wrist surgery; and Back surgery.     Social History  He reports that he quit smoking about 24 years ago. His smoking use included cigarettes. He has never used smokeless tobacco. He reports that he does not currently use alcohol. He reports that he does not use drugs.    Family History  No family history on file.     Allergies  Pollen extracts and Statins-hmg-coa reductase inhibitors    Review of Systems  Complete review of system negative     Physical Exam  General: Not in acute distress, alert.  On nasal cannula  HEENT: PERRLA, head intact and normocephalic  Neck: Normal to inspection  Lungs: Clear to auscultation, work of breathing within normal limit  Cardiac: Regular rate and rhythm  Abdomen: Soft nontender, positive bowel sounds  : Montoya catheter in place  Skin: Unable to examine the back for the dressing due to patient currently being  Hematology: No petechia or excessive ecchymosis  Musculoskeletal: Without significant trauma.  Bilateral lower extremity compression stockings noted  Neurological: Alert awake oriented, no focal deficit, cranial nerves grossly intact  Psych: No suicidal ideation or homicidal ideation       Last Recorded Vitals  /74 (BP Location: Right arm, Patient Position: Lying)   Pulse 60   Temp 36 °C (96.8 °F) (Temporal)   Resp 16   Wt 95.3 kg (210 lb)   SpO2 96%     Relevant Results  ECG 12 Lead    Result Date: 2/15/2024  Sinus bradycardia Right bundle branch block Abnormal ECG When compared with ECG of 08-AUG-2023 11:14, Premature ventricular complexes are no longer Present Criteria for Inferior infarct are no longer Present Nonspecific T wave abnormality no longer evident in Anterior leads Confirmed by Brett Meza ([id number]) on 2/15/2024 9:45:59 PM    MR lumbar spine w and wo IV contrast    Result Date: 1/31/2024  Interpreted By:  Jon Butler, STUDY: MR LUMBAR SPINE W AND WO IV CONTRAST;  1/30/2024 10:16 pm    INDICATION: Signs/Symptoms:low back and leg pain hx of Sx. Status post L2 through S1 midline laminectomy in 2021. Recurrent left low back pain 6 months ago radiating to buttock and lateral thigh   COMPARISON: None.   ACCESSION NUMBER(S): [id number]   ORDERING CLINICIAN: PER TORRES   TECHNIQUE: Multiplanar and multisequential MR images of the lumbar spine are performed. The study is supplemented with post gadolinium T1 weighted sequences obtained in the sagittal and axial plane. 19 cc of intravenous gadolinium is utilized.   FINDINGS: The patient is status post midline laminectomy from L2 through S1. There is no postoperative fluid collection at the surgical site. There is some atrophy of the paraspinal musculature.   There is bilateral L4 spondylolysis and anterolisthesis of L4 measuring 4-5 mm. There is mild lumbar levo convexity. The remaining lumbar AP alignment is within normal limits.   There are multilevel discogenic degenerative changes of the lumbar spine with disc desiccation from L2 through S1. There is mild to moderate disc space narrowing at L4-5 and mild disc space narrowing at L5-S1. There are mild discogenic degenerative changes also noted in the lower thoracic spine at T11-12 and to a lesser extent T10-11.   The conus medullaris is of normal morphology and signal. The tip of the conus descends to the L1 level. There is no pathologic enhancement in the conus.   The lumbar vertebral body heights are maintained. There is mild degenerative endplate signal at L4-5 primarily at the inferior endplate of L4 posteriorly. There is otherwise no acute bone marrow edema. There is no pathologic bone marrow enhancement. Vacuum phenomenon is identified at L4-5 and L5-S1.   Axial images are performed through the lumbar disc spaces from the mid T12 level through S1.   The T12-L1 disc space level demonstrates mild bilateral facet arthrosis and ligamentum flavum hypertrophy. There is no central canal or neural  foraminal stenosis.   The L1-2 disc space level demonstrates mild bilateral facet arthrosis and ligamentum flavum hypertrophy. There is mild bulging disc with some flattening of the anterior thecal sac and mild central canal narrowing. The there is no significant neural foraminal stenosis.   The L2-3 disc space level demonstrates previous midline laminectomy. There is mild to moderate bilateral facet arthrosis and ligamentum flavum hypertrophy. There is mild circumferential bulging disc with flattening of the anterior thecal sac. There is disc and osteophyte encroachment with moderate bilateral neural foraminal narrowing.   The L3-4 disc space level demonstrates previous midline laminectomy. There is mild to moderate bilateral facet arthrosis. There is moderate circumferential bulging disc with mass effect upon the anterior thecal sac. There is disc encroachment with moderate bilateral neural foraminal narrowing.   The L4-5 disc space level demonstrates previous midline laminectomy. There is moderate central canal narrowing. There is severe circumferential bulging disc with mass effect upon the anterior thecal sac and narrowing of the lateral recesses. There is narrowing at the lateral aspects of the thecal sac with crowding of the intrathecal nerve roots. There is disc encroachment and moderate to severe bilateral neural foraminal narrowing.   The L5-S1 disc space level demonstrates mild bilateral facet arthrosis greater on the right. There is mild to moderate bulging disc asymmetric to the left. There is no mass effect upon the thecal sac or S1 nerve roots. There is disc encroachment with mild narrowing at the caudal neural foramen bilaterally.       Status post midline laminectomy from L2 through S1. There is no posterior fusion hardware. There is no postoperative fluid collection at the surgical site.   L4 anterolisthesis measuring 4-5 mm.   Multilevel discogenic degenerative changes, more pronounced at L4-5.  There is multilevel bulging disc which is greatest at L4-5 though also significant at L3-4 and L5-S1. At L4-5 there is narrowing of the transverse thecal sac diameter with crowding of the intrathecal nerve roots.   There is bilateral multilevel neural foraminal narrowing from L2 through S1 secondary to disc and osteophyte encroachment. Disc space narrowing is greatest and moderate to severe bilaterally at L4-5 and of moderate degree at L2-3 and L3-4 bilaterally.   No acute osseous abnormality.   Signed by: Jon Butler 1/31/2024 3:24 PM Dictation workstation:   [id number]    XR lumbar spine 4+ views w flexion extension    Result Date: 1/23/2024  Interpreted By:  Brett Torres, STUDY: XR LUMBAR SPINE 4+ VIEWS WITH FLEXION EXTENSION;  ;  1/23/2024 9:38 am   INDICATION: Signs/Symptoms:low back pain.   ACCESSION NUMBER(S): [id number]   ORDERING CLINICIAN: BRETT TORRES   FINDINGS: AP lateral flexion extension x-rays lumbar spine show moderate degenerative changes at all levels with endplate irregularity and osteophyte formation. There is a dynamic spondylolisthesis at L4-5 which slips forward to 9 mm. There is no spondylolysis. There is no fractures. Lumbar lordosis is maintained. Range of motion with flexion and extension is preserved. There is a scoliosis of 10 degrees. Pedicles are visualized at all levels. There is a midline laminectomy from L3 down through S1. Bony pelvis and hips are partially visualized and show mild bilateral hip degenerative changes.     Signed by: Brett Torres 1/23/2024 9:50 AM Dictation workstation:   [id number]       No results found for this or any previous visit (from the past 24 hour(s)).       Assessment/Plan     Russ Lagos is a 78-year-old male with past medical history of rheumatoid arthritis, CAD s/p PCI, chronic back pain, COPD, former smoker, hyperlipidemia, hypertension, neuropathy, PMR, restless leg syndrome presented for elective procedure with   [facility].  Patient had L4-L5 revision with instrumentation.  Patient is being seen postoperatively for medical management.  Patient was holding his Plavix for a week.  He was on aspirin before for his stents but was upgraded to Plavix after a TIA.  Patient is on oxygen and has a catheter after the procedure.  Otherwise he is feeling well and has no complaints.    Medical management consult  Patient's medication from prior to arrival is reviewed    L4-L5 revision with instrumentation  Management as primary service  Advised patient on incentive spirometry  PT OT and labs as per primary service  Multimodality pain control    Hypertension  Continue with metoprolol, hydrochlorothiazide, lisinopril    Hyperlipidemia  Zetia    Rheumatoid arthritis  Continue with folic acid and multimodality pain control    COPD  Stable  Breathing treatment as needed  Educated patient on incentive spirometry  Wean O2 as tolerated  Patient normally not on oxygen  Continue singular    BPH  Continue Flomax  Postprocedure patient has Montoya catheter  Remove Montoya once patient is mobile and working with therapy    History of TIA and CAD  Continue with metoprolol, lisinopril, Zetia, Plavix    Gout  Allopurinol    Restless leg syndrome with neuropathy  Continue with gabapentin    GERD  PPI and famotidine    DVT prophylaxis  As per primary service    Thank you for allowing us to participate in this patient's care.  Please call us or reconsult us if needed.  Plan was discussed with patient, significant family member, daughter and son-in-law at bedside.  Poncho Moreno MD

## 2024-02-21 NOTE — OP NOTE
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  L4-5 stenosis.  Lumbar spondylosis.    Postop diagnosis: Same    Procedure: 1) L4-5 removal of Patten fragment.  2) L4-5 posterior lumbar interbody fusion  3) L4-5 posterior lateral fusion 4) L4-5 instrumentation 5) L4-5 interbody cage  6) Thermal ablation of the medial nerve branch supplying the facets at L4-5 on the left.                 Surgeon: Brett Norton M.D.    Asst.: Marc MELO The physician assistant was present through the entire case. Given the nature of the disease process and the procedure to be performed a skilled surgical assistant was necessary during the case. The assistant was necessary in order to hold retractors and directly assist in the operation. A certified scrub tech was at the back table managing instruments and supplies for the surgical case.    Anesthesia: General    Blood Loss: 50 cc    Complications: None    HPI: The patient had pain from the above-described condition. The patient failed all nonoperative treatment. All of the risks, benefits, and potential complications for operative and nonoperative treatment were discussed at length with the patient. Risks of operative intervention include, but are not limited to: Anesthesia complications, excessive blood loss, infection, damaged to uninjured structures including, but not limited to, the dural sac and nerve roots, blood clots, and lack of improvement or worsening of symptoms. These complications could result in death, permanent disability, or need for reoperation. The patient completely understood those risks and wished to proceed with operative intervention.    Operative implants: NuVasive MIS screws.  Globus rise titanium cage size 30 mm long 10 mm wide 7 mm high, Actifuse gun bone graft.    Operative procedure: The patient was wheeled from the preanesthesia care unit to the theater. The patient was placed in supine position and all bony prominences were well-padded.  For the entire procedure anesthesia maintainined control of the patient's head neck. General anesthesia was induced. The patient was then placed prone on the Ed table. All bony prominences were well-padded. X-rays were then taken to confirm appropriate visualization of the operative level in AP and lateral projections.. Usingl fluoroscopic imaging the cephalad and caudal pedicles were marked on the patient's skin. The incisions were then marked. Next, the back was prepped and draped in normal sterile fashion.    Timeout was performed, site verification was verified, and appropriate antibiotic administration was confirmed. A longitudinal incision was then performed off to the left side of the marke pedicles. Dissection was carried down through the skin with a knife and Bovie was used to dissect down  the fat and the fascia. Blunt dissection was taken down to the transverse processes and facet on the left side. Next, the identical procedure was performed on the right side.    Using AP and lateral fluoroscopic imaging and Pediguard, an and intrapedicular approach was performed.  Using fluoroscopic imaging to to ensure that there was no breech of the pedicle, the trocar was brought down just through the posterior vertebral body wall of the right cephalad pedicle.  This was done by starting at the 3 o'clock position. The guidewire was docked into the vertebral body. Next the identical procedure was performed on the left  starting at the 9 o'clock position. In the identical procedure was then performed bilaterally at the caudal level for both pedicles. The wires were covered with suction tubing and appropriately stored. Appropriate pedicle guard readings were obtained for all 4 wires.    Using lateral x-ray, dilators were placed on the patient's left side over the L4-5 level facet and lamina. Dilators were dilated up to a size 22 and a permanent 26 mm tube was docked into appropriate position visualized on AP and  lateral projections. The microscope was then brought in for use. Bovie was used to remove a small amount of fat and muscle overlying the facet.  A Bovie was then used to perform thermal ablation at that level using direct visualization to confirm the thermal ablation was performed in the area of the medial sensory nerve branch to that facet.  Bovie was then used to free up soft tissues until the lateral edge of the laminectomy was seen.  A curved curette was used to enter just into the epidural space into the old laminectomy window.  Pars defect was clearly visualized of L4 at this level.  Through a distinct and separate procedure as would be needed to place the interbody cage the tube was then moved to a different approach laterally to remove the entire Patten fragment.  This was removed with Kerrisons straight pituitary and a bur.  Kerrisons were then used to perform a laminectomy from the cephalad portion of the L5 pedicle to the caudal portion of the L4 pedicle and into the epidural space peeling scar off of the medial portion of the prior laminectomy window.  This decompression nicely decompress the L4 nerve root with removal of the Patten fragment.    A laminectomy that was separate and distinct from the Patten fragment removal, was then performed to provide room to place the interbody cage.  At this point the lateral edge of the dural sac was seen clearly and was decompressed.  A nerve root retractor was used to gently retract and protect the dural sac and nerve roots.  This nicely exposed the disc. Bipolar cautery and FloSeal was used to maintain hemostasis. The disc was box cut and the annulus was removed with the pituitary rongeur. Pituitaries were then used to remove the nucleus inside the disc. Rotating zahra were then used. Curved curettes were used to the right into the left to remove disc off of the endplate all the way to the contralateral side and down to the annulus anteriorly. Upbiters and backbiters  were used to remove disc as well. At this point there was complete disc removal down to the endplates and the annulus.  The disc was irrigated copiously and suctioned free of all debris.    The Actifuse gun was then placed the disc space the disc space was filled with graft.  The cage was then placed down into the disc space visualized in AP and lateral projections to be appropriately positioned. It was expanded until appropriate tension was achieved and disc height restoration was achieved as visualized on AP and lateral fluoroscopic views. Using direct visualization, the insertion handle was removed and AP lateral x-rays confirmed appropriate positioning of the cage. Using the microscope for visualization a long ball was swung underneath the exiting nerve root and dural sac and traversing nerve root to ensure there was no impingement from extruded disc or bone graft.  Valsalva was performed was no evidence of any dural leak or excessive bleeding. Hemostasis was maintained for the entire case using FloSeal, however no FloSeal was left in the patient. The wound was irrigated copiously and the tube was removed under direct visualization.    Screws were then placed into all 4 pedicles at the L4-5 level by first measuring the length of the screw, tapping, using spinal cord monitoring to measure off the tap and obtaining appropriate reading, and then placing the screw. Wires were removed after the screw was into the vertebral body but not completely seated. At this point all 4 screws were placed and the rods were passed using the passing gun. The rods were locked into position using the locking caps which were torqued appropriately. Insertion handles were removed and final AP lateral x-rays of the entire construct confirmed appropriate positioning of the screws, rods and cage.  Next, the Actifuse gun was used to place bone graft from L4 transverse process to the L5 transverse process.  Morselized bone graft that was  prepared from local lamina and facet was also placed in the lateral gutter.  This bone graft completely filled the posterior lateral gutter to provide a nice fusion from transverse process to transverse process along the lateral portion of the facets.    The fascia for both incisions was closed with a running 0 Vicryl suture. The fatty layer was closed with 0 Vicryl the skin was closed with 2-0 Vicryl, 4-0 Monocryl, and Steri-Strips. A sterile dressing was applied. At the conclusion of the case the patient's toes were pink and warm with brisk capillary refill. The patient tolerated the procedure well. There were no EMG changes for the entire case. Appropriate spinal cord monitor readings were obtained for all pedicle screws.          This dictation was created using voice recognition software. If there are any questions about inaccuracies please do not hesitate to contact me.

## 2024-02-21 NOTE — ANESTHESIA POSTPROCEDURE EVALUATION
Patient: Russ Lagos    Procedure Summary       Date: 02/21/24 Room / Location: [facility] OR 06 / Virtual [facility] OR    Anesthesia Start: 0754 Anesthesia Stop:     Procedure: L4-5 LAMINECTOMY REVISION, INSTRUMENTATION, POSTERIOR INTERBODY FUSION, POSTERIOR LATERAL FUSION (Spine Lumbar) Diagnosis:       Spinal stenosis, lumbar region without neurogenic claudication      Spondylolisthesis, lumbar region      (Spinal stenosis, lumbar region without neurogenic claudication [M48.061])      (Spondylolisthesis, lumbar region [M43.16])    Surgeons: Brett Norton MD Responsible Provider: OLIVA Robert    Anesthesia Type: general ASA Status: 3            Anesthesia Type: general    Vitals Value Taken Time   /65 02/21/24 1121   Temp 36 02/21/24 1129   Pulse 68 02/21/24 1127   Resp 16 02/21/24 1127   SpO2 97 % 02/21/24 1127   Vitals shown include unvalidated device data.    Anesthesia Post Evaluation    Patient location during evaluation: PACU  Patient participation: waiting for patient participation  Level of consciousness: sleepy but conscious  Pain management: adequate  Airway patency: patent  Cardiovascular status: acceptable  Respiratory status: acceptable  Hydration status: balanced  Postoperative Nausea and Vomiting: none    36    There were no known notable events for this encounter.

## 2024-02-21 NOTE — ANESTHESIA PREPROCEDURE EVALUATION
Patient: Russ Lagos    Procedure Information       Date/Time: 02/21/24 0730    Procedure: L4-5 LAMINECTOMY REVISION, INSTRUMENTATION, POSTERIOR INTERBODY FUSION, POSTERIOR LATERAL FUSION (Spine Lumbar)    Location: [facility] OR 06 / [facility] OR    Surgeons: Brett Norton MD            Relevant Problems   Cardiovascular   (+) Atherosclerosis of coronary artery   (+) Mixed hyperlipidemia   (+) Non Q wave myocardial infarction (CMS/HCC)   (+) Primary hypertension      /Renal   (+) CKD (chronic kidney disease) stage 3, GFR 30-59 ml/min (CMS/HCC)   (+) UTI (urinary tract infection)      Neuro/Psych   (+) Back pain of lumbar region with sciatica   (+) CTS (carpal tunnel syndrome)   (+) Idiopathic peripheral autonomic neuropathy      Pulmonary   (+) Chronic obstructive pulmonary disease (CMS/HCC)      Musculoskeletal   (+) CTS (carpal tunnel syndrome)   (+) Lumbar spondylosis   (+) Lumbar stenosis   (+) Other intervertebral disc degeneration, lumbar region   (+) Rheumatoid arthritis (CMS/HCC)   (+) Spinal stenosis of lumbar region with neurogenic claudication   (+) Spinal stenosis, lumbar region without neurogenic claudication      Infectious Disease   (+) UTI (urinary tract infection)      Other   (+) Adhesive capsulitis of right shoulder   (+) Rheumatoid arthritis (CMS/HCC)       Clinical information reviewed:   Tobacco  Allergies  Meds   Med Hx  Surg Hx   Fam Hx  Soc Hx        NPO Detail:  NPO/Void Status  Carbohydrate Drink Given Prior to Surgery? : N  Date of Last Liquid: 02/21/24  Time of Last Liquid: 0625  Date of Last Solid: 02/20/24  Time of Last Solid: 2000  Last Intake Type: Clear fluids  Time of Last Void: 0630         Physical Exam    Airway  Mallampati: II  TM distance: >3 FB     Cardiovascular   Rhythm: regular  Rate: normal     Dental - normal exam     Pulmonary   Breath sounds clear to auscultation     Abdominal        Anesthesia Plan    History of general anesthesia?: yes  History of  complications of general anesthesia?: no    ASA 3     general     intravenous induction   Postoperative administration of opioids is intended.  Anesthetic plan and risks discussed with patient.

## 2024-02-21 NOTE — ANESTHESIA PROCEDURE NOTES
Airway  Date/Time: 2/21/2024 8:23 AM  Urgency: elective    Airway not difficult    Staffing  Performed: resident   Authorized by: OLIVA Robert    Performed by: OLIVA Robert  Patient location during procedure: OR    Indications and Patient Condition  Indications for airway management: anesthesia and airway protection  Spontaneous Ventilation: absent  Sedation level: deep  Preoxygenated: yes  Mask difficulty assessment: 0 - not attempted    Final Airway Details  Final airway type: endotracheal airway      Cuffed: yes   Successful intubation technique: video laryngoscopy  Facilitating devices/methods: intubating stylet  Endotracheal tube insertion site: oral  Blade: Erica  Blade size: #4  Cormack-Lehane Classification: grade I - full view of glottis  Placement verified by: chest auscultation and capnometry   Measured from: lips  ETT to lips (cm): 23  Number of attempts at approach: 1  Ventilation between attempts: BVM

## 2024-02-21 NOTE — DISCHARGE INSTR - ACTIVITY
Dr. Norton spine  Call Dr. Norton for any problems and/or concerns.  *Maintain spine precautions  *Log roll as instructed  *Walk as much as possible  *Avoid pushing, pulling, bending, twisting, and sitting/laying down in the same position for long periods of time  *No baths, hot tubs, or pools until cleared by physician; no lotions, creams, ointments over incision  *You may shower, do not soak or scrub incision; pat dry  *Continue the coughing and deep breathing exercises that you learned in the hospital  *Do not engage in sports, heavy work or lifting  *If you have a brace/collar-wear as instructed by physician and/or therapy, keep the brace/collar clean and dry, check the skin around the brace/collar every day and notify your physician of any concerns    Call Doctor right away for:  *Fever above 100.4/shaking chills  *New pain, weakness, warmth, or numbness in your legs  *Foot, ankle, or calf swelling that is not relieved by elevating your feet  *Inability to urinate/move bowels  *A severe headache  *Chest pain/shortness of breath-call 911  *Difficulty swallowing (cervical surgery)    If your doctor has ordered compression stockings, wear as directed as they help to prevent blood clots and reduce swelling in your legs    Do not use any products that contain nicotine or tobacco, such as cigarettes, e-cigarettes, and chewing tobacco. These can delay bone healing after surgery. If you need help quitting, ask your healthcare provider    -No heavy lifting or straining until patient is seen in the office.  -Encourage ambulation at least 3 times a day.  -No formal physical therapy until ordered by Dr. Norton.  -Follow-up in the office in 2 weeks.  Appointment should already be made.  -You must be accompanied by a responsible adult upon discharge and for 24 hours after surgery.  Do not drive a motor vehicle, operate machinery, power tools or appliances, drink alcoholic beverages, or make critical decisions for 24  hours and while on narcotic pain meds.  -Be aware of dizziness, which may cause a fall.  Change positions slowly.  -You may resume your regular diet, but do so slowly.  -Use your pain medication prescription as prescribed by your physician.  If possible, take your medication with food, and drink plenty of fluids.  -Call your surgeon if you have questions, temperature of 101° or greater, increased bleeding swelling or pain, drainage from the incision or increased redness around the incision.  -Call [phone] with any questions or concerns.

## 2024-02-22 VITALS
WEIGHT: 210 LBS | SYSTOLIC BLOOD PRESSURE: 126 MMHG | BODY MASS INDEX: 26.95 KG/M2 | DIASTOLIC BLOOD PRESSURE: 59 MMHG | RESPIRATION RATE: 16 BRPM | OXYGEN SATURATION: 96 % | HEART RATE: 68 BPM | TEMPERATURE: 97.2 F | HEIGHT: 74 IN

## 2024-02-22 LAB
ANION GAP SERPL CALC-SCNC: 11 MMOL/L (ref 10–20)
BUN SERPL-MCNC: 27 MG/DL (ref 6–23)
CALCIUM SERPL-MCNC: 8.5 MG/DL (ref 8.6–10.3)
CHLORIDE SERPL-SCNC: 109 MMOL/L (ref 98–107)
CO2 SERPL-SCNC: 26 MMOL/L (ref 21–32)
CREAT SERPL-MCNC: 1.24 MG/DL (ref 0.5–1.3)
EGFRCR SERPLBLD CKD-EPI 2021: 59 ML/MIN/1.73M*2
ERYTHROCYTE [DISTWIDTH] IN BLOOD BY AUTOMATED COUNT: 14.6 % (ref 11.5–14.5)
GLUCOSE SERPL-MCNC: 127 MG/DL (ref 74–99)
HCT VFR BLD AUTO: 42.2 % (ref 41–52)
HGB BLD-MCNC: 13.4 G/DL (ref 13.5–17.5)
MCH RBC QN AUTO: 30.7 PG (ref 26–34)
MCHC RBC AUTO-ENTMCNC: 31.8 G/DL (ref 32–36)
MCV RBC AUTO: 97 FL (ref 80–100)
NRBC BLD-RTO: 0 /100 WBCS (ref 0–0)
PLATELET # BLD AUTO: 191 X10*3/UL (ref 150–450)
POTASSIUM SERPL-SCNC: 4 MMOL/L (ref 3.5–5.3)
RBC # BLD AUTO: 4.37 X10*6/UL (ref 4.5–5.9)
SODIUM SERPL-SCNC: 142 MMOL/L (ref 136–145)
WBC # BLD AUTO: 12.6 X10*3/UL (ref 4.4–11.3)

## 2024-02-22 PROCEDURE — 2500000001 HC RX 250 WO HCPCS SELF ADMINISTERED DRUGS (ALT 637 FOR MEDICARE OP): Performed by: INTERNAL MEDICINE

## 2024-02-22 PROCEDURE — 97535 SELF CARE MNGMENT TRAINING: CPT | Mod: GO | Performed by: OCCUPATIONAL THERAPIST

## 2024-02-22 PROCEDURE — 2500000001 HC RX 250 WO HCPCS SELF ADMINISTERED DRUGS (ALT 637 FOR MEDICARE OP): Performed by: PHYSICIAN ASSISTANT

## 2024-02-22 PROCEDURE — 36415 COLL VENOUS BLD VENIPUNCTURE: CPT | Performed by: PHYSICIAN ASSISTANT

## 2024-02-22 PROCEDURE — 97161 PT EVAL LOW COMPLEX 20 MIN: CPT | Mod: GP

## 2024-02-22 PROCEDURE — 2500000004 HC RX 250 GENERAL PHARMACY W/ HCPCS (ALT 636 FOR OP/ED): Performed by: PHYSICIAN ASSISTANT

## 2024-02-22 PROCEDURE — 97165 OT EVAL LOW COMPLEX 30 MIN: CPT | Mod: GO | Performed by: OCCUPATIONAL THERAPIST

## 2024-02-22 PROCEDURE — 7100000011 HC EXTENDED STAY RECOVERY HOURLY - NURSING UNIT

## 2024-02-22 PROCEDURE — 2500000002 HC RX 250 W HCPCS SELF ADMINISTERED DRUGS (ALT 637 FOR MEDICARE OP, ALT 636 FOR OP/ED): Performed by: INTERNAL MEDICINE

## 2024-02-22 PROCEDURE — 80048 BASIC METABOLIC PNL TOTAL CA: CPT | Performed by: PHYSICIAN ASSISTANT

## 2024-02-22 PROCEDURE — 97116 GAIT TRAINING THERAPY: CPT | Mod: GP

## 2024-02-22 PROCEDURE — 85027 COMPLETE CBC AUTOMATED: CPT | Performed by: PHYSICIAN ASSISTANT

## 2024-02-22 RX ORDER — CYCLOBENZAPRINE HCL 10 MG
10 TABLET ORAL 3 TIMES DAILY PRN
Qty: 15 TABLET | Refills: 0 | Status: SHIPPED | OUTPATIENT
Start: 2024-02-22 | End: 2024-02-25

## 2024-02-22 RX ORDER — OXYCODONE HYDROCHLORIDE 5 MG/1
5 TABLET ORAL EVERY 6 HOURS PRN
Qty: 28 TABLET | Refills: 0 | Status: SHIPPED | OUTPATIENT
Start: 2024-02-22 | End: 2024-02-29

## 2024-02-22 RX ORDER — ACETAMINOPHEN 325 MG/1
650 TABLET ORAL EVERY 6 HOURS PRN
Qty: 240 TABLET | Refills: 0 | Status: SHIPPED | OUTPATIENT
Start: 2024-02-22 | End: 2024-03-23

## 2024-02-22 RX ADMIN — HYDROCHLOROTHIAZIDE 12.5 MG: 12.5 TABLET ORAL at 06:54

## 2024-02-22 RX ADMIN — PANTOPRAZOLE SODIUM 40 MG: 40 TABLET, DELAYED RELEASE ORAL at 06:55

## 2024-02-22 RX ADMIN — OXYCODONE HYDROCHLORIDE 5 MG: 5 TABLET ORAL at 06:54

## 2024-02-22 RX ADMIN — ACETAMINOPHEN 650 MG: 325 TABLET ORAL at 14:42

## 2024-02-22 RX ADMIN — ACETAMINOPHEN 650 MG: 325 TABLET ORAL at 08:34

## 2024-02-22 RX ADMIN — Medication 4000 UNITS: at 08:34

## 2024-02-22 RX ADMIN — ACETAMINOPHEN 650 MG: 325 TABLET ORAL at 03:05

## 2024-02-22 RX ADMIN — GABAPENTIN 300 MG: 300 CAPSULE ORAL at 14:42

## 2024-02-22 RX ADMIN — GABAPENTIN 300 MG: 300 CAPSULE ORAL at 08:33

## 2024-02-22 RX ADMIN — POLYETHYLENE GLYCOL 3350 17 G: 17 POWDER, FOR SOLUTION ORAL at 08:33

## 2024-02-22 RX ADMIN — OXYCODONE HYDROCHLORIDE 5 MG: 5 TABLET ORAL at 14:42

## 2024-02-22 RX ADMIN — OXYCODONE HYDROCHLORIDE 5 MG: 5 TABLET ORAL at 10:52

## 2024-02-22 RX ADMIN — OXYCODONE HYDROCHLORIDE 5 MG: 5 TABLET ORAL at 03:06

## 2024-02-22 ASSESSMENT — PAIN SCALES - GENERAL
PAINLEVEL_OUTOF10: 4
PAINLEVEL_OUTOF10: 3
PAINLEVEL_OUTOF10: 4
PAINLEVEL_OUTOF10: 2
PAINLEVEL_OUTOF10: 5 - MODERATE PAIN
PAINLEVEL_OUTOF10: 2
PAINLEVEL_OUTOF10: 3
PAINLEVEL_OUTOF10: 5 - MODERATE PAIN

## 2024-02-22 ASSESSMENT — PAIN DESCRIPTION - ORIENTATION
ORIENTATION: LOWER

## 2024-02-22 ASSESSMENT — COGNITIVE AND FUNCTIONAL STATUS - GENERAL
DAILY ACTIVITIY SCORE: 24
CLIMB 3 TO 5 STEPS WITH RAILING: A LITTLE
MOBILITY SCORE: 22
WALKING IN HOSPITAL ROOM: A LITTLE

## 2024-02-22 ASSESSMENT — PAIN - FUNCTIONAL ASSESSMENT
PAIN_FUNCTIONAL_ASSESSMENT: 0-10

## 2024-02-22 ASSESSMENT — ACTIVITIES OF DAILY LIVING (ADL)
HOME_MANAGEMENT_TIME_ENTRY: 8
ADL_ASSISTANCE: INDEPENDENT
ADL_ASSISTANCE: INDEPENDENT
LACK_OF_TRANSPORTATION: NO

## 2024-02-22 ASSESSMENT — PAIN DESCRIPTION - LOCATION
LOCATION: BACK

## 2024-02-22 NOTE — PROGRESS NOTES
"Russ Lagos is a 78 y.o. male on day 0 of admission presenting with Back pain of lumbar region with sciatica.    Subjective   Seen in conjunction with Dr. Norton.  Lying in bed upon entering room.  Currently verbalizing no complaints.  Believes symptoms that he was having prior to surgery have improved however unable to qualify or quantify.  No complaints of new numbness or tingling in lower extremities, denies saddle anesthesia, Montoya catheter recently removed, but denies any ulcerations or bowel or bladder function.  No complaints of chest pain, shortness of breath, lightheaded or dizziness.  Plan is for discharge to home later today       Objective     Physical Exam  Constitutional:       Appearance: Normal appearance.   HENT:      Head: Normocephalic and atraumatic.      Nose: Nose normal.      Mouth/Throat:      Mouth: Mucous membranes are moist.   Cardiovascular:      Rate and Rhythm: Normal rate.   Pulmonary:      Effort: Pulmonary effort is normal.   Abdominal:      General: Abdomen is flat.      Palpations: Abdomen is soft.   Musculoskeletal:      Cervical back: Neck supple.      Comments: Movement in bilateral lower extremities intact, plantar and dorsiflexion against resistance present in bilateral feet, sensation equal in bilateral lower extremities, back with dressings x 2 clean dry and intact   Skin:     General: Skin is warm and dry.   Neurological:      General: No focal deficit present.      Mental Status: He is alert.   Psychiatric:         Mood and Affect: Mood normal.         Last Recorded Vitals  Blood pressure 114/67, pulse 60, temperature 36 °C (96.8 °F), temperature source Temporal, resp. rate 16, height 1.88 m (6' 2\"), weight 95.3 kg (210 lb), SpO2 94 %.  Intake/Output last 3 Shifts:  I/O last 3 completed shifts:  In: 6654.2 (69.9 mL/kg) [P.O.:1380; I.V.:4974.2 (52.2 mL/kg); IV Piggyback:300]  Out: 3520 (37 mL/kg) [Urine:3490 (1 mL/kg/hr); Blood:30]  Weight: 95.3 kg     Relevant " Results  Results for orders placed or performed during the hospital encounter of 02/21/24 (from the past 24 hour(s))   CBC   Result Value Ref Range    WBC 12.6 (H) 4.4 - 11.3 x10*3/uL    nRBC 0.0 0.0 - 0.0 /100 WBCs    RBC 4.37 (L) 4.50 - 5.90 x10*6/uL    Hemoglobin 13.4 (L) 13.5 - 17.5 g/dL    Hematocrit 42.2 41.0 - 52.0 %    MCV 97 80 - 100 fL    MCH 30.7 26.0 - 34.0 pg    MCHC 31.8 (L) 32.0 - 36.0 g/dL    RDW 14.6 (H) 11.5 - 14.5 %    Platelets 191 150 - 450 x10*3/uL   Basic metabolic panel   Result Value Ref Range    Glucose 127 (H) 74 - 99 mg/dL    Sodium 142 136 - 145 mmol/L    Potassium 4.0 3.5 - 5.3 mmol/L    Chloride 109 (H) 98 - 107 mmol/L    Bicarbonate 26 21 - 32 mmol/L    Anion Gap 11 10 - 20 mmol/L    Urea Nitrogen 27 (H) 6 - 23 mg/dL    Creatinine 1.24 0.50 - 1.30 mg/dL    eGFR 59 (L) >60 mL/min/1.73m*2    Calcium 8.5 (L) 8.6 - 10.3 mg/dL     Scheduled medications  acetaminophen, 650 mg, oral, q6h  allopurinol, 100 mg, oral, Daily  cholecalciferol, 4,000 Units, oral, Daily  [Held by provider] clopidogrel, 75 mg, oral, Daily  ezetimibe, 10 mg, oral, Daily  famotidine, 40 mg, oral, Daily  folic acid, 1 mg, oral, Daily  gabapentin, 300 mg, oral, TID  hydroCHLOROthiazide, 12.5 mg, oral, Daily before breakfast  lisinopril, 5 mg, oral, Daily  magnesium oxide, 400 mg, oral, Daily  metoprolol succinate XL, 25 mg, oral, Daily  montelukast, 10 mg, oral, Nightly  oxygen, , inhalation, Continuous - 02/gases  pantoprazole, 40 mg, oral, Daily before breakfast  polyethylene glycol, 17 g, oral, Daily  tamsulosin, 0.4 mg, oral, Daily      Continuous medications  sodium chloride 0.9%, 100 mL/hr, Last Rate: 100 mL/hr (02/21/24 8737)      PRN medications  PRN medications: cyclobenzaprine, dextrose 10 % in water (D10W), dextrose, glucagon, ipratropium-albuteroL, naloxone, ondansetron ODT **OR** ondansetron, oxyCODONE, oxyCODONE, oxyCODONE    Assessment/Plan   Principal Problem:    Back pain of lumbar region with  sciatica  Active Problems:    Spinal stenosis    Spinal stenosis, lumbar region without neurogenic claudication    Spondylolisthesis, lumbar region    Postop day 1 of 1) L4-5 removal of Patten fragment. 2) L4-5 posterior lumbar interbody fusion 3) L4-5 posterior lateral fusion 4) L4-5 instrumentation 5) L4-5 interbody cage 6) Thermal ablation of the medial nerve branch supplying the facets at L4-5 on the left.   Physical and Occupational Therapy are on consult  Weightbearing as tolerated with walker  No VTE chemo prophylaxis at this time, patient may resume Plavix 1 week after surgery per Dr. Norton to avoid increased bleeding in surgical area and need for return to the OR, patient understands that during this time may be higher risk for stroke  Labs reviewed  Multimodal pain regime  Home medications ordered for chronic medical conditions as appropriate  Bowel regime  Encourage incentive spirometry  Plans on discharge to home  Follow-up with Dr. Norton as directed       I spent 30 minutes in the professional and overall care of this patient.      Ayla Farrar PA-C

## 2024-02-22 NOTE — PROGRESS NOTES
Physical Therapy    Physical Therapy Evaluation and Treatment    Patient Name: Russ Lagos  MRN: [id number]  Today's Date: 2/22/2024   Time Calculation  Start Time: 1107  Stop Time: 1123  Time Calculation (min): 16 min    Assessment/Plan   PT Assessment  PT Assessment Results: Decreased strength, Decreased endurance, Impaired balance, Decreased mobility, Decreased safety awareness, Pain, Orthopedic restrictions  Rehab Prognosis: Good  Evaluation/Treatment Tolerance: Patient tolerated treatment well  Medical Staff Made Aware: Yes  End of Session Communication: Bedside nurse  Assessment Comment: Pt is SBA to modified independent will all ambulation and transfers. Pt is safe to discharge home at this time with available support and assist. No further skilled inpatient PT needs. Pt may benefit from PT at a low intensity discharge to maximize safety and functional mobility.     End of Session Patient Position: Up in chair, Alarm on  IP OR SWING BED PT PLAN  Inpatient or Swing Bed: Inpatient  PT Plan  PT Plan: Skilled PT  PT Frequency: PT eval only  PT Discharge Recommendations: Low intensity level of continued care  Equipment Recommended upon Discharge: Wheeled walker  PT Recommended Transfer Status: Assist x1 (FWW)  PT - OK to Discharge: Yes (To next level of care when cleared by medical team   )    Subjective       General Visit Information:  General  Reason for Referral: s/p spine surgery  Referred By: Brett Norton MD  Past Medical History Relevant to Rehab: Pt admitted 2/21/24 following completion of 1) L4-5 removal of Patten fragment.  2) L4-5 posterior lumbar interbody fusion  3) L4-5 posterior lateral fusion 4) L4-5 instrumentation 5) L4-5 interbody cage  6) Thermal ablation of the medial nerve branch supplying the facets at L4-5 on the left. PMH: CKD stage 3, lumbar spondylosis and stenosis, neuropathy, RA, HLD  Family/Caregiver Present: No  Prior to Session Communication: Bedside nurse  Patient Position  Received: Up in chair  Preferred Learning Style: verbal  General Comment: Pt agreeable to PT, nursing cleared for treatment.    Home Living:  Home Living  Type of Home: House  Lives With: Spouse  Home Adaptive Equipment: Cane  Home Layout: Two level, Stairs to alternate level with rails, Bed/bath upstairs (4 stairs with rail up to kitchen)  Alternate Level Stairs-Number of Steps: 10  Home Access: Stairs to enter without rails  Entrance Stairs-Number of Steps: 2 steps down from garage  Bathroom Shower/Tub: Walk-in shower    Prior Level of Function:  Prior Function Per Pt/Caregiver Report  ADL Assistance: Independent  Homemaking Assistance: Independent  Ambulatory Assistance: Independent  Prior Function Comments: reports one fall in the past 6 months    Precautions:  Precautions  Medical Precautions: Fall precautions, Spinal precautions  Precautions Comment: Per MD, pt to wear LSO. Pt does not know where brace is and does not want to obtain a new brace.    Vital Signs:     Objective     Pain:  Pain Assessment  Pain Assessment: 0-10  Pain Score: 4  Pain Type: Surgical pain  Pain Location: Back  Pain Orientation: Lower    Cognition:  Cognition  Overall Cognitive Status: Within Functional Limits    General Assessments:      Activity Tolerance  Endurance: Tolerates 10 - 20 min exercise with multiple rests  Sensation  Sensation Comment: denies numbness and tingling              Static Sitting Balance  Static Sitting-Comment/Number of Minutes: good  Dynamic Sitting Balance  Dynamic Sitting-Comments: good  Static Standing Balance  Static Standing-Comment/Number of Minutes: fair  Dynamic Standing Balance  Dynamic Standing-Comments: fair    Functional Assessments:     Bed Mobility  Bed Mobility: No  Transfers  Transfer: Yes  Transfer 1  Transfer From 1: Sit to  Transfer to 1: Stand  Transfer Device 1: Walker  Transfer Level of Assistance 1: Modified independent  Transfers 2  Transfer From 2: Stand to  Transfer to 2:  Sit  Transfer Device 2: Walker  Transfer Level of Assistance 2: Modified independent  Ambulation/Gait Training  Ambulation/Gait Training Performed: Yes  Ambulation/Gait Training 1  Device 1: Rolling walker  Gait Support Devices: Gait belt  Assistance 1: Distant supervision  Quality of Gait 1: Inconsistent stride length, Decreased step length, Forward flexed posture (ambulates too far from walker frame at times. Mild right trunk lean without FWW)  Comments/Distance (ft) 1: 300 feet with FWW, 60 feet x 2 with no AD  Stairs  Stairs: Yes  Stairs  Rails 1: Left  Device 1: Railing  Support Devices 1: Gait belt  Assistance 1: Close supervision  Comment/Number of Steps 1: 3 stairs x 2     Treatment    Educated pt on spinal precautions and log roll technique. Cues for safe hand placement with use of FWW for sit<>stand. Instruction provided in safe ambulation with FWW with cues given for upright posture and walker positioning. Instruction provided in safe stair climbing strategy.     Extremity/Trunk Assessments:        RLE   RLE : Within Functional Limits  LLE   LLE : Within Functional Limits    Outcome Measures:  [facility] Basic Mobility  Turning from your back to your side while in a flat bed without using bedrails: None  Moving from lying on your back to sitting on the side of a flat bed without using bedrails: None  Moving to and from bed to chair (including a wheelchair): None  Standing up from a chair using your arms (e.g. wheelchair or bedside chair): None  To walk in hospital room: A little  Climbing 3-5 steps with railing: A little  Basic Mobility - Total Score: 22        Education Documentation  Precautions, taught by Gretchen Link PT at 2/22/2024 12:32 PM.  Learner: Patient  Readiness: Acceptance  Method: Explanation  Response: Verbalizes Understanding, Demonstrated Understanding  Comment: Spinal precautions, proper use of FWW for ambulation and transfers, safe stair climbing technique    Body Mechanics, taught  by Gretchen Link, PT at 2/22/2024 12:32 PM.  Learner: Patient  Readiness: Acceptance  Method: Explanation  Response: Verbalizes Understanding, Demonstrated Understanding  Comment: Spinal precautions, proper use of FWW for ambulation and transfers, safe stair climbing technique    Mobility Training, taught by Gretchen Link, PT at 2/22/2024 12:32 PM.  Learner: Patient  Readiness: Acceptance  Method: Explanation  Response: Verbalizes Understanding, Demonstrated Understanding  Comment: Spinal precautions, proper use of FWW for ambulation and transfers, safe stair climbing technique    Education Comments  No comments found.           Yes

## 2024-02-22 NOTE — NURSING NOTE
Pt cleared for discharge. Waiting for wife to get out of work, anytime after 4pm. Pt voiding and passing flatus. Reviewed DC instructions with pt. Addressed all questions and concerns. Pt has 3 prescriptions at own pharmacy to . IV not removed until pt is ready to leave.

## 2024-02-22 NOTE — PROGRESS NOTES
Occupational Therapy  Evaluation    Patient Name: Russ Lagos  MRN: [id number]  Today's Date: 2/22/2024  Time Calculation  Start Time: 0958  Stop Time: 1021  Time Calculation (min): 23 min    Assessment  IP OT Assessment  OT Assessment: Patient demonstrates impulsivity but indepdendent with FWW with self care and mobility.  No skilled acute OT needs at this time.  Anticipate safe discharge to prior level of living with low intensity therapy.  Prognosis: Good  Barriers to Discharge: None  Evaluation/Treatment Tolerance: Patient tolerated treatment well  Medical Staff Made Aware: Yes  End of Session Communication: Bedside nurse  End of Session Patient Position: Up in chair, Alarm on    Plan:  No Skilled OT: Safe to return home  OT Frequency: OT eval only  OT Discharge Recommendations: Low intensity level of continued care  Equipment Recommended upon Discharge: Wheeled walker  OT Recommended Transfer Status: Stand by assist, Assist of 1  OT - OK to Discharge: Yes (to next level of care when medically cleared by physician)    Subjective     Current Problem:  1. Back pain of lumbar region with sciatica            General:  General  Reason for Referral: s/p spine surgery  Referred By: Dr. Norton  Past Medical History Relevant to Rehab: RA, CAD s/p PCI, chronic back pain, COPD, HTN, HLD, neuropathy, MI, restless leg  Prior to Session Communication: Bedside nurse  Patient Position Received: Bed, 3 rail up, Alarm on  General Comment: Patient agreeable to OOB.    Precautions:  Medical Precautions: Fall precautions, Spinal precautions  Precautions Comment: Per MD, pt to wear LSO; however patient does not know where his currenlty is located.    Vital Signs:       Pain:  Pain Assessment  Pain Assessment: 0-10  Pain Score: 2  Pain Location: Back  Pain Orientation: Lower  Pain Interventions: Medication (See MAR)    Objective     Cognition:  Overall Cognitive Status: Within Functional Limits      Home Living:  Type of Home:  House  Lives With: Spouse  Home Adaptive Equipment: Cane  Home Layout: Two level  Home Access: Stairs to enter with rails  Entrance Stairs-Number of Steps: 6+4  Bathroom Shower/Tub: Walk-in shower     Prior Function:  ADL Assistance: Independent  Homemaking Assistance: Independent  Ambulatory Assistance: Independent      ADL:  Grooming Assistance: Stand by  UE Dressing Assistance: Stand by  LE Dressing Assistance: Stand by  Toileting Assistance with Device: Stand by    Activity Tolerance:  Endurance: Tolerates 10 - 20 min exercise with multiple rests    Bed Mobility/Transfers:   Bed Mobility  Bed Mobility: Yes  Bed Mobility 1  Bed Mobility 1: Supine to sitting  Level of Assistance 1: Distant supervision  Bed Mobility Comments 1: used log roll  Transfers  Transfer: Yes (sit to stand with FWW with SBA, transfer to chair with SBA with FWW)    Ambulation/Gait Training:  Ambulation/Gait Training  Ambulation/Gait Training Performed: Yes (Patient ambulated with FWW with SBA)           Extremities: RUE   RUE : Within Functional Limits and LUE   LUE: Within Functional Limits    Outcome Measures: [facility] Daily Activity  Putting on and taking off regular lower body clothing: None  Bathing (including washing, rinsing, drying): None  Putting on and taking off regular upper body clothing: None  Toileting, which includes using toilet, bedpan or urinal: None  Taking care of personal grooming such as brushing teeth: None  Eating Meals: None  Daily Activity - Total Score: 24      EDUCATION:  Education  Individual(s) Educated: Patient  Education Provided: Fall precautons, Risk and benefits of OT discussed with patient or other  Plan of Care Discussed and Agreed Upon: yes  Patient Response to Education: Patient/Caregiver Verbalized Understanding of Information    Goals: No goals

## 2024-02-22 NOTE — DISCHARGE SUMMARY
Discharge Diagnosis  Back pain of lumbar region with sciatica    Issues Requiring Follow-Up  Lumbar back surgery    Test Results Pending At Discharge  Pending Labs       No current pending labs.            Hospital Course     Discharge summary  This patient Russ Lagos was admitted to the hospital on 2/21/2024  after undergoing Procedure(s) (LRB):  L4-5 LAMINECTOMY REVISION, INSTRUMENTATION, POSTERIOR INTERBODY FUSION, POSTERIOR LATERAL FUSION (N/A) without complications that morning.    During the postoperative period,while in hospital, patient was medically managed by the hospitalist. Please see medical notes and H&P for patients additional diagnoses.  Ortho agrees with all medical diagnoses and treatments while patient in hospital.  No significant or unexpected findings or abnormal ortho imaging were noted during the hospital stay    Hospital course      Patient tolerated surgical procedure well and there was no complications. Patient progressed adequately through their recovery during hospital stay including PT and rehabilitation.    Patient was then D/C on  to home  in stable condition.  Patient was instructed on the use of pain medications, the signs and symptoms of infection, and was given our number to call should they have any questions or concerns following discharge.    Based on my clinical judgment, the patient was provided with a 7-day prescription for opioid medication at 30 MED, indicated for treatment of acute pain in the setting of recent surgery. OARRS report was run and has demonstrated an appropriate time course.  The patient has been provided with counseling pertaining to safe use of opioid medication.    No bending, twisting, or lifting  Surgical dressing to be removed after 24 hours  Follow up with surgeon in 2 weeks             Pertinent Physical Exam At Time of Discharge  Physical Exam    Home Medications     Medication List      START taking these medications     acetaminophen 325 mg  tablet; Commonly known as: Tylenol; Take 2 tablets   (650 mg) by mouth every 6 hours if needed for mild pain (1 - 3).   cyclobenzaprine 10 mg tablet; Commonly known as: Flexeril; Take 1 tablet   (10 mg) by mouth 3 times a day as needed for muscle spasms for up to 3   days.   oxyCODONE 5 mg immediate release tablet; Commonly known as: Roxicodone;   Take 1 tablet (5 mg) by mouth every 6 hours if needed for moderate pain (4   - 6) or severe pain (7 - 10) for up to 7 days.     CONTINUE taking these medications     allopurinol 100 mg tablet; Commonly known as: Zyloprim   cholecalciferol 50 MCG (2000 UT) tablet; Commonly known as: Vitamin D-3   clopidogrel 75 mg tablet; Commonly known as: Plavix; Take 1 tablet (75   mg) by mouth once daily.   ezetimibe 10 mg tablet; Commonly known as: Zetia; Take 1 tablet (10 mg)   by mouth once daily.   famotidine 40 mg tablet; Commonly known as: Pepcid   folic acid 800 mcg tablet; Commonly known as: Folvite   gabapentin 600 mg tablet; Commonly known as: Neurontin   hydroCHLOROthiazide 12.5 mg tablet; Commonly known as: HYDRODiuril; Take   1 tablet (12.5 mg) by mouth once daily in the morning. Take before meals.   ipratropium-albuteroL 0.5-2.5 mg/3 mL nebulizer solution; Commonly known   as: Duo-Neb   lisinopril 5 mg tablet; Take 1 tablet (5 mg) by mouth once daily.   magnesium oxide 400 mg (241.3 mg magnesium) tablet; Commonly known as:   Mag-Ox   metoprolol succinate XL 25 mg 24 hr tablet; Commonly known as:   Toprol-XL; Take 1 tablet (25 mg) by mouth once daily.   montelukast 10 mg tablet; Commonly known as: Singulair   omeprazole 40 mg DR capsule; Commonly known as: PriLOSEC   tamsulosin 0.4 mg 24 hr capsule; Commonly known as: Flomax     STOP taking these medications     chlorhexidine 0.12 % solution; Commonly known as: Peridex   Prostate Health 160-100-100 mg-unit-mcg tablet; Generic drug: saw-vit   E-sod [id number]       Outpatient Follow-Up  Future Appointments   Date Time  Provider Department [place]   3/5/2024  9:15 AM Brett Norton MD [id number] [place]   3/6/2024  9:00 AM PEDRO Martinez-CNP [id number] [place]   4/9/2024  9:30 AM Cristiano Laboy MD [id number] [place]   6/18/2024 10:00 AM Yovani Rangel MD [id number] [place]       ANTONINO RamirezC

## 2024-02-22 NOTE — CARE PLAN
The patient's goals for the shift include      The clinical goals for the shift include pt will demostrate comfort aeb sleeping in long intervals of 3 hours or greater by end of this shift    Over the shift, the patient did well with goals , able to ambulate with 1person , gait belt and walker tolerated well, pt has slept in long intervals and reposition self throughout this shift, pt is aware of logrolling and exercises which he was able to demostrate well pt has used ncl as needed and has remained safe this shift

## 2024-02-22 NOTE — PROGRESS NOTES
"Met with patient at the bedside. Patient lives at home with his wife, daughter, and two grandchildren. Patient is independent at home typically without use of device. Has crutches and canes--states he received a walker after back surgery a couple years ago with Dr Norton, but does not know where the walker is--patient declines receiving walker this admit-states \"If I need one, I will get one from [facility].\" Denies issues obtaining or affording medications. Denies any need for home therapy as well. Denies home-going needs. Await therapy evals.   "

## 2024-02-29 ENCOUNTER — TELEPHONE (OUTPATIENT)
Dept: ORTHOPEDIC SURGERY | Facility: CLINIC | Age: 79
End: 2024-02-29
Payer: MEDICARE

## 2024-02-29 DIAGNOSIS — M54.16 LUMBAR RADICULOPATHY: ICD-10-CM

## 2024-02-29 DIAGNOSIS — M54.50 LOW BACK PAIN, UNSPECIFIED BACK PAIN LATERALITY, UNSPECIFIED CHRONICITY, UNSPECIFIED WHETHER SCIATICA PRESENT: ICD-10-CM

## 2024-02-29 RX ORDER — OXYCODONE AND ACETAMINOPHEN 5; 325 MG/1; MG/1
1 TABLET ORAL EVERY 12 HOURS PRN
Qty: 10 TABLET | Refills: 0 | Status: SHIPPED | OUTPATIENT
Start: 2024-02-29 | End: 2024-03-04 | Stop reason: WASHOUT

## 2024-02-29 NOTE — TELEPHONE ENCOUNTER
Called and spoke with Chanda, after speaking with Dr. Norton's team, we discussed the drainage looking dark brown, like dry blood, we told her to go head and remove all the original bandaging from surgery and replace with new, keeping an eye out for any new signs of drainage, and to let us know if he continues to drain. Also requested a refill of pain medication which will be sent to pharmacy.

## 2024-02-29 NOTE — TELEPHONE ENCOUNTER
Patient's wife, Chanda, called and stated that Russ recently had back surgery and she noticed some drainage on his bandages. She would like to know if this is normal and if there is anything she should do. I advised I would reach out to Dr. Norton's team and would call them once I heard.

## 2024-03-04 RX ORDER — OXYCODONE AND ACETAMINOPHEN 5; 325 MG/1; MG/1
1 TABLET ORAL EVERY 12 HOURS PRN
Qty: 10 TABLET | Refills: 0 | Status: SHIPPED | OUTPATIENT
Start: 2024-03-04 | End: 2024-03-09

## 2024-03-04 NOTE — PROGRESS NOTES
Subjective   Russ Lagos is a 78 y.o. right-handed male. 6 month f/u  HPI  The pt. 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 DAPT therapy complete and he was continued on Plavix therapy due to aspirin failure.  His TTE showed a mildly decreased EF from baseline at 45% with global hypokinesis of left ventricle.   Today, he is doing ok. He continues with Plavix and Zetia daily. He had lumbar back surgery (L4-5 LAMINECTOMY REVISION, INSTRUMENTATION, POSTERIOR INTERBODY FUSION, POSTERIOR LATERAL FUSION). He is still having some pain, occasionally taking pain meds (Oxycodone vs. Tylenol). He is walking for PT.  He sees Dr. Laboy for cardiology and has another appt. In June and has given him a copy of his ECHO. He is concerned about his BP so low, but is taking all his medications at night time. Manual /72.  Review of systems are negative unless otherwise specified in HPI.   Objective   Neurological Exam  Mental Status  Awake, alert and oriented to person, place and time. Speech is normal. Language is fluent with no aphasia. Attention and concentration are normal.    Cranial Nerves  CN III, IV, VI: Pupils equal round and reactive to light bilaterally.  And EOM's Normal.    Motor   Strength is 5/5 throughout all four extremities.    Sensory  Light touch is normal in upper and lower extremities.     Reflexes  Deep tendon reflexes are 2+ and symmetric except as noted.  Hyporeflexia in BLE.    Gait  Casual gait: Wide stance.    Physical Exam  Eyes:      Pupils: Pupils are equal, round, and reactive to light.   Cardiovascular:      Rate and Rhythm: Normal rate.   Neurological:      Motor: Motor strength is normal.  Psychiatric:         Speech: Speech normal.         Assessment/Plan   Continue with Plavix and Zetia. Discussed taking his Lisinopril in the afternoon to space out his medications. He does not want to schedule a follow up in 1 year because he sees too many doctors. He will call us when he needs to  be seen. Refills sent for 1 year.   Discussed bleeding precautions with patient while on anti platelet and/or anticoagulation therapy. Discussed stroke prevention such as: HgbA1c <7, tight blood pressure control <130/<80, LDL <70 with statin therapy (if indicated), CPAP/BiPAP compliance (if indicated) and lifestyle modification (Mediterranean diet, daily exercise, nicotine cessation & limiting alcohol use).   Discussed s/sx of stroke such as: face drooping, arm/leg weakness, speech difficulty; sudden numbness of face/extremity, sudden confusion, sudden trouble seeing, sudden trouble walking, sudden severe headache, dizziness, loss of balance or coordination and to call 911 immediately.?

## 2024-03-05 ENCOUNTER — OFFICE VISIT (OUTPATIENT)
Dept: ORTHOPEDIC SURGERY | Facility: CLINIC | Age: 79
End: 2024-03-05
Payer: MEDICARE

## 2024-03-05 ENCOUNTER — HOSPITAL ENCOUNTER (OUTPATIENT)
Dept: RADIOLOGY | Facility: CLINIC | Age: 79
Discharge: HOME | End: 2024-03-05
Payer: MEDICARE

## 2024-03-05 DIAGNOSIS — M54.16 LUMBAR RADICULOPATHY: ICD-10-CM

## 2024-03-05 DIAGNOSIS — M54.16 LUMBAR RADICULOPATHY: Primary | ICD-10-CM

## 2024-03-05 PROCEDURE — 1159F MED LIST DOCD IN RCRD: CPT | Performed by: ORTHOPAEDIC SURGERY

## 2024-03-05 PROCEDURE — 72100 X-RAY EXAM L-S SPINE 2/3 VWS: CPT | Performed by: ORTHOPAEDIC SURGERY

## 2024-03-05 PROCEDURE — 99024 POSTOP FOLLOW-UP VISIT: CPT | Performed by: ORTHOPAEDIC SURGERY

## 2024-03-05 PROCEDURE — 72100 X-RAY EXAM L-S SPINE 2/3 VWS: CPT

## 2024-03-05 PROCEDURE — 1126F AMNT PAIN NOTED NONE PRSNT: CPT | Performed by: ORTHOPAEDIC SURGERY

## 2024-03-05 PROCEDURE — 1036F TOBACCO NON-USER: CPT | Performed by: ORTHOPAEDIC SURGERY

## 2024-03-05 PROCEDURE — 1160F RVW MEDS BY RX/DR IN RCRD: CPT | Performed by: ORTHOPAEDIC SURGERY

## 2024-03-05 NOTE — PROGRESS NOTES
Russ Lagos is a 78 y.o. male who presents for Post-op of the Lower Back (2/21/24 rev L4-5 MIS - TLIF 13 days out).    HPI:  78-year-old gentleman here for 2-week postop visit.  He is 2 weeks out from an L4-5 revision MIS TLIF.  He denies any fever chills nausea vomiting night sweats.  He has no bowel or bladder complaints.    Physical exam:  Well-nourished, well kept.No lymphangitis or lymphadenopathy in the examined extremities.  Gait normal.  Can stand on heels and toes.   Examination of the back shows mild tenderness in the paraspinous musculature.  There is minimally decreased range of motion in all directions due to guarding/muscle spasms and pain at extremes.  There is good strength and no instability.  Examination of the lower extremities reveals no point tenderness, swelling, or deformity.  Range of motion of the hips, knees, and ankles are full without crepitance, instability, or exacerbation of pain.  Strength is 5/5 throughout.  No redness, abrasions, or lesions on extremities  Gross sensation intact in the extremities.  Affect normal.  Alert and oriented ×3.  Coordination normal.  Incisions are both well-healed.  Steri-Strips were removed.  Incision open to air.  No signs of dehiscence redness swelling tenderness warmth or discharge.    Imaging studies:  AP lateral plain films were obtained and reviewed.    Assessment:  78-year-old gentleman is here for 2-week postop visit.  He is 2 weeks out from an L4-5 revision MIS TLIF.  He is doing well.  He has no left leg pain, his preoperative condition was radicular pain in the left leg.  He is 100% better in regard to his left leg.  He has some expected postoperative back pain, but is doing really well.    Plan:  We will see him back in 4 weeks just to see how he is doing.  I do not believe we will need any x-rays at that time

## 2024-03-06 ENCOUNTER — OFFICE VISIT (OUTPATIENT)
Dept: NEUROLOGY | Facility: CLINIC | Age: 79
End: 2024-03-06
Payer: MEDICARE

## 2024-03-06 VITALS
WEIGHT: 205.4 LBS | HEIGHT: 74 IN | BODY MASS INDEX: 26.36 KG/M2 | DIASTOLIC BLOOD PRESSURE: 69 MMHG | HEART RATE: 82 BPM | SYSTOLIC BLOOD PRESSURE: 103 MMHG

## 2024-03-06 DIAGNOSIS — I63.9 ACUTE CVA (CEREBROVASCULAR ACCIDENT) (MULTI): Primary | ICD-10-CM

## 2024-03-06 DIAGNOSIS — E78.2 MIXED HYPERLIPIDEMIA: ICD-10-CM

## 2024-03-06 PROBLEM — G25.81 RESTLESS LEGS SYNDROME: Status: ACTIVE | Noted: 2018-01-03

## 2024-03-06 PROBLEM — N50.819 PAIN IN TESTICLE: Status: ACTIVE | Noted: 2023-11-27

## 2024-03-06 PROBLEM — N39.0 URINARY TRACT INFECTION: Status: ACTIVE | Noted: 2023-11-27

## 2024-03-06 PROBLEM — M25.529 ELBOW PAIN: Status: ACTIVE | Noted: 2024-03-06

## 2024-03-06 PROBLEM — R69 DISORDER: Status: ACTIVE | Noted: 2024-03-06

## 2024-03-06 PROBLEM — N50.3 CYST OF EPIDIDYMIS: Status: ACTIVE | Noted: 2018-05-23

## 2024-03-06 PROBLEM — M25.539 PAIN IN WRIST: Status: ACTIVE | Noted: 2024-03-06

## 2024-03-06 PROBLEM — R05.9 COUGH, UNSPECIFIED: Status: ACTIVE | Noted: 2022-10-22

## 2024-03-06 PROBLEM — I49.9 CARDIAC ARRHYTHMIA: Status: ACTIVE | Noted: 2024-03-06

## 2024-03-06 PROBLEM — I21.4 NON-Q WAVE MYOCARDIAL INFARCTION (MULTI): Status: ACTIVE | Noted: 2023-11-27

## 2024-03-06 PROBLEM — R20.0 NUMBNESS OF LOWER EXTREMITY: Status: ACTIVE | Noted: 2023-11-27

## 2024-03-06 PROBLEM — M71.122 INFECTION OF LEFT OLECRANON BURSA: Status: ACTIVE | Noted: 2024-03-06

## 2024-03-06 PROBLEM — M70.20 OLECRANON BURSITIS: Status: ACTIVE | Noted: 2024-03-06

## 2024-03-06 PROBLEM — M25.559 ARTHRALGIA OF HIP: Status: ACTIVE | Noted: 2018-06-20

## 2024-03-06 PROBLEM — G45.9 TRANSIENT ISCHEMIC ATTACK: Status: ACTIVE | Noted: 2024-03-06

## 2024-03-06 PROBLEM — R36.1 HEMOSPERMIA: Status: ACTIVE | Noted: 2023-11-27

## 2024-03-06 PROBLEM — R93.89 STANDARD CHEST X-RAY ABNORMAL: Status: ACTIVE | Noted: 2023-11-27

## 2024-03-06 PROBLEM — Z20.822 CONTACT WITH AND (SUSPECTED) EXPOSURE TO COVID-19: Status: ACTIVE | Noted: 2022-10-22
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
## 2024-03-06 PROBLEM — M43.16 SPONDYLOLISTHESIS OF LUMBAR REGION: Status: ACTIVE | Noted: 2024-02-21

## 2024-03-06 PROBLEM — M70.30 BURSITIS OF ELBOW: Status: ACTIVE | Noted: 2024-03-06

## 2024-03-06 PROCEDURE — 99214 OFFICE O/P EST MOD 30 MIN: CPT

## 2024-03-06 PROCEDURE — 1159F MED LIST DOCD IN RCRD: CPT

## 2024-03-06 PROCEDURE — 3074F SYST BP LT 130 MM HG: CPT

## 2024-03-06 PROCEDURE — 3078F DIAST BP <80 MM HG: CPT

## 2024-03-06 PROCEDURE — 1126F AMNT PAIN NOTED NONE PRSNT: CPT

## 2024-03-06 PROCEDURE — 1160F RVW MEDS BY RX/DR IN RCRD: CPT

## 2024-03-06 PROCEDURE — 1036F TOBACCO NON-USER: CPT

## 2024-03-06 RX ORDER — METHYLPREDNISOLONE 4 MG/1
TABLET ORAL
COMMUNITY
Start: 2024-01-18 | End: 2024-04-09 | Stop reason: WASHOUT

## 2024-03-06 RX ORDER — SODIUM CHLORIDE 9 MG/ML
100 INJECTION, SOLUTION INTRAVENOUS
COMMUNITY
Start: 2024-02-21 | End: 2024-04-09 | Stop reason: WASHOUT

## 2024-03-06 RX ORDER — METHOTREXATE 2.5 MG/1
TABLET ORAL
COMMUNITY

## 2024-03-06 RX ORDER — POLYETHYLENE GLYCOL 3350 17 G/17G
17 POWDER, FOR SOLUTION ORAL
COMMUNITY
Start: 2024-02-21 | End: 2024-04-09 | Stop reason: WASHOUT

## 2024-03-06 RX ORDER — PANTOPRAZOLE SODIUM 40 MG/1
40 TABLET, DELAYED RELEASE ORAL
COMMUNITY
Start: 2024-02-22

## 2024-03-06 RX ORDER — DEXTROSE MONOHYDRATE 100 MG/ML
0.3 INJECTION, SOLUTION INTRAVENOUS
COMMUNITY
Start: 2024-02-21 | End: 2024-04-09 | Stop reason: WASHOUT

## 2024-03-06 RX ORDER — NAPROXEN 500 MG/1
TABLET ORAL
COMMUNITY
Start: 2020-12-08 | End: 2024-04-09 | Stop reason: WASHOUT

## 2024-03-06 RX ORDER — CLOPIDOGREL BISULFATE 75 MG/1
75 TABLET ORAL DAILY
Qty: 90 TABLET | Refills: 3 | Status: SHIPPED | OUTPATIENT
Start: 2024-03-06 | End: 2025-03-06

## 2024-03-06 RX ORDER — EZETIMIBE 10 MG/1
10 TABLET ORAL DAILY
Qty: 90 TABLET | Refills: 3 | Status: SHIPPED | OUTPATIENT
Start: 2024-03-06 | End: 2025-03-06

## 2024-03-06 RX ORDER — GUAIFENESIN 600 MG/1
TABLET, EXTENDED RELEASE ORAL EVERY 12 HOURS
COMMUNITY
End: 2024-04-09 | Stop reason: WASHOUT

## 2024-03-06 RX ORDER — BROMPHENIRAMINE MALEATE, PSEUDOEPHEDRINE HYDROCHLORIDE, AND DEXTROMETHORPHAN HYDROBROMIDE 2; 30; 10 MG/5ML; MG/5ML; MG/5ML
SYRUP ORAL
COMMUNITY
Start: 2023-12-30 | End: 2024-04-09 | Stop reason: WASHOUT

## 2024-03-06 RX ORDER — LIDOCAINE HYDROCHLORIDE 10 MG/ML
INJECTION, SOLUTION EPIDURAL; INFILTRATION; INTRACAUDAL; PERINEURAL
COMMUNITY
Start: 2023-03-07 | End: 2024-04-09 | Stop reason: WASHOUT

## 2024-03-06 RX ORDER — FLUCONAZOLE 150 MG/1
TABLET ORAL
COMMUNITY
Start: 2019-12-23 | End: 2024-04-09 | Stop reason: WASHOUT

## 2024-03-06 RX ORDER — NALOXONE HYDROCHLORIDE 0.4 MG/ML
0.2 INJECTION, SOLUTION INTRAMUSCULAR; INTRAVENOUS; SUBCUTANEOUS
COMMUNITY
Start: 2024-02-21 | End: 2024-04-09 | Stop reason: WASHOUT

## 2024-03-06 ASSESSMENT — PATIENT HEALTH QUESTIONNAIRE - PHQ9
1. LITTLE INTEREST OR PLEASURE IN DOING THINGS: NOT AT ALL
2. FEELING DOWN, DEPRESSED OR HOPELESS: NOT AT ALL
SUM OF ALL RESPONSES TO PHQ9 QUESTIONS 1 & 2: 0

## 2024-03-27 ENCOUNTER — TELEPHONE (OUTPATIENT)
Dept: ORTHOPEDIC SURGERY | Facility: CLINIC | Age: 79
End: 2024-03-27
Payer: MEDICARE

## 2024-03-27 NOTE — TELEPHONE ENCOUNTER
No Answer/Busy - Tried to return call to patient three times and it rang busy every time. He does not have to log roll to get out of bed anymore, as long as he isn't making any harsh movements. And it is still normal to have back discomfort this far out from Sx.

## 2024-04-09 ENCOUNTER — OFFICE VISIT (OUTPATIENT)
Dept: CARDIOLOGY | Facility: CLINIC | Age: 79
End: 2024-04-09
Payer: MEDICARE

## 2024-04-09 VITALS
DIASTOLIC BLOOD PRESSURE: 60 MMHG | BODY MASS INDEX: 28.7 KG/M2 | HEIGHT: 72 IN | SYSTOLIC BLOOD PRESSURE: 114 MMHG | HEART RATE: 68 BPM | WEIGHT: 211.9 LBS

## 2024-04-09 DIAGNOSIS — N18.30 STAGE 3 CHRONIC KIDNEY DISEASE, UNSPECIFIED WHETHER STAGE 3A OR 3B CKD (MULTI): ICD-10-CM

## 2024-04-09 DIAGNOSIS — R55 SYNCOPE AND COLLAPSE: ICD-10-CM

## 2024-04-09 DIAGNOSIS — Z87.891 FORMER SMOKER: ICD-10-CM

## 2024-04-09 DIAGNOSIS — E78.2 MIXED HYPERLIPIDEMIA: ICD-10-CM

## 2024-04-09 DIAGNOSIS — I25.10 ATHEROSCLEROSIS OF NATIVE CORONARY ARTERY OF NATIVE HEART WITHOUT ANGINA PECTORIS: ICD-10-CM

## 2024-04-09 DIAGNOSIS — I21.4 NON-Q WAVE MYOCARDIAL INFARCTION (MULTI): ICD-10-CM

## 2024-04-09 DIAGNOSIS — I63.9 ACUTE CVA (CEREBROVASCULAR ACCIDENT) (MULTI): ICD-10-CM

## 2024-04-09 DIAGNOSIS — I10 PRIMARY HYPERTENSION: ICD-10-CM

## 2024-04-09 PROCEDURE — 1036F TOBACCO NON-USER: CPT | Performed by: INTERNAL MEDICINE

## 2024-04-09 PROCEDURE — 3074F SYST BP LT 130 MM HG: CPT | Performed by: INTERNAL MEDICINE

## 2024-04-09 PROCEDURE — 1160F RVW MEDS BY RX/DR IN RCRD: CPT | Performed by: INTERNAL MEDICINE

## 2024-04-09 PROCEDURE — 99213 OFFICE O/P EST LOW 20 MIN: CPT | Performed by: INTERNAL MEDICINE

## 2024-04-09 PROCEDURE — 3078F DIAST BP <80 MM HG: CPT | Performed by: INTERNAL MEDICINE

## 2024-04-09 PROCEDURE — 1159F MED LIST DOCD IN RCRD: CPT | Performed by: INTERNAL MEDICINE

## 2024-04-09 NOTE — PATIENT INSTRUCTIONS
Patient to follow up in 6 months with Dr. Cristiano Laboy MD [facility]     No changes today.   Continue same medications and treatments.   Patient educated on proper medication use.   Patient educated on risk factor modification.   Please bring any lab results from other providers / physicians to your next appointment.     Please bring all medicines, vitamins, and herbal supplements with you when you come to the office.     Prescriptions will not be filled unless you are compliant with your follow up appointments or have a follow up appointment scheduled as per instruction of your physician. Refills should be requested at the time of your visit.    Fer CORTES RN am scribing for and in the presence of Dr. Cristiano Laboy MD [facility]

## 2024-04-09 NOTE — PROGRESS NOTES
Referred by Dr. Shaikh ref. provider found provider found for   Chief Complaint   Patient presents with    Follow-up     9 month        History of Present Illness  Russ Lagos is a 78 y.o. year old male patient with history of coronary disease.  Blood pressure about 114 systolic.  Did not have any symptoms of chest pain palpitations syncope or presyncope.  He is currently off hydrochlorothiazide.  Discussed with the patient in length we will continue medication will call for any problem and follow-up as scheduled    Past Medical History  Past Medical History:   Diagnosis Date    Acid reflux     Allergy status to unspecified drugs, medicaments and biological substances     History of seasonal allergies    Arrhythmia     NSVT and PSVT    Chronic pain disorder     CKD (chronic kidney disease)     stage 2-3    COPD (chronic obstructive pulmonary disease) (CMS/[facility])     Coronary artery disease     with PCI x3    Family history of prostate problems     Heart attack (CMS/[facility])     Hypertension     Lumbar disc disease     Old myocardial infarction     History of myocardial infarction    Peripheral neuropathy     Personal history of urinary calculi     History of renal calculi    PMR (polymyalgia rheumatica) (CMS/[facility])     Rheumatoid arthritis (CMS/[facility])     Sciatica     Stroke (CMS/[facility])     Syncope     TIA (transient ischemic attack)     Unspecified abdominal hernia without obstruction or gangrene     Hernia       Social History  Social History     Tobacco Use    Smoking status: Former     Types: Cigarettes     Quit date:      Years since quittin.2    Smokeless tobacco: Never   Vaping Use    Vaping Use: Never used   Substance Use Topics    Alcohol use: Not Currently     Comment: quit 30 years ago    Drug use: Never       Family History     Family History   Problem Relation Name Age of Onset    Heart attack Mother      Lymphoma Father         Review of Systems  As per HPI, all other systems reviewed and  negative.    Allergies:  Allergies   Allergen Reactions    Pollen Extracts Other and Unknown     Sinus congestion    Statins-Hmg-Coa Reductase Inhibitors Myalgia        Outpatient Medications:  Current Outpatient Medications   Medication Instructions    cholecalciferol (VITAMIN D-3) 100 mcg, oral, Daily    clopidogrel (PLAVIX) 75 mg, oral, Daily    cyclobenzaprine (FLEXERIL) 10 mg, oral, 3 times daily PRN    ezetimibe (ZETIA) 10 mg, oral, Daily    famotidine (PEPCID) 40 mg, oral, Daily    folic acid (FOLVITE) 0.8 mg, oral, Daily    gabapentin (NEURONTIN) 300 mg, oral, 3 times daily    hydroCHLOROthiazide (MICROZIDE) 12.5 mg, oral, Daily before breakfast    ipratropium-albuteroL (Duo-Neb) 0.5-2.5 mg/3 mL nebulizer solution Take 3 mL by nebulization 4 times a day as needed for shortness of breath or wheezing.    lisinopril 5 mg, oral, Daily    magnesium oxide (Mag-Ox) 400 mg (241.3 mg magnesium) tablet 1 tablet, oral, Daily    methotrexate (Trexall) 2.5 mg tablet oral    metoprolol succinate XL (TOPROL-XL) 25 mg, oral, Daily    montelukast (SINGULAIR) 10 mg, oral, Nightly    omeprazole (PriLOSEC) 40 mg DR capsule 1 capsule, oral, Daily    pantoprazole (PROTONIX) 40 mg, oral    tamsulosin (FLOMAX) 0.4 mg, oral, Daily    triamcinolone acetonide (Kenalog) 10 mg/mL injection Kenalog 10 MG/ML Injection Suspension Rt wrist Quantity: 0 Refills: 0 Ordered: 7-Mar-2023 Juliocesar Jefferson DO Start : 7-Mar-2023 Complete         Vitals:  Vitals:    04/09/24 0938   BP: 114/60   Pulse: 68       Physical Exam:  Physical Exam  Constitutional:       Appearance: Normal appearance.   HENT:      Head: Normocephalic and atraumatic.   Eyes:      Extraocular Movements: Extraocular movements intact.      Pupils: Pupils are equal, round, and reactive to light.   Cardiovascular:      Rate and Rhythm: Normal rate and regular rhythm.      Pulses: Normal pulses.      Heart sounds: Normal heart sounds.   Pulmonary:      Effort: Pulmonary effort is  normal.      Breath sounds: Normal breath sounds.   Abdominal:      General: Abdomen is flat.      Palpations: Abdomen is soft.   Musculoskeletal:      Right lower leg: No edema.      Left lower leg: No edema.   Skin:     General: Skin is warm and dry.   Neurological:      General: No focal deficit present.      Mental Status: He is alert and oriented to person, place, and time.             Assessment/Plan   Diagnoses and all orders for this visit:  Primary hypertension  Non-Q wave myocardial infarction (CMS/HCC)  Mixed hyperlipidemia  Atherosclerosis of native coronary artery of native heart without angina pectoris  Stage 3 chronic kidney disease, unspecified whether stage 3a or 3b CKD (CMS/HCC)  Syncope and collapse  BMI 28.0-28.9,adult  Former smoker  Acute CVA (cerebrovascular accident) (CMS/[facility])          Cristiano Laboy MD formerly [facility]  Interventional Cardiology   of [facility]     Thank you for allowing me to participate in the care of this patient. Please do not hesitate to contact me with any further questions or concerns.

## 2024-06-03 ENCOUNTER — LAB (OUTPATIENT)
Dept: LAB | Facility: LAB | Age: 79
End: 2024-06-03
Payer: MEDICARE

## 2024-06-03 DIAGNOSIS — R35.1 NOCTURIA: ICD-10-CM

## 2024-06-03 LAB
APPEARANCE UR: CLEAR
BILIRUB UR STRIP.AUTO-MCNC: NEGATIVE MG/DL
CAOX CRY #/AREA UR COMP ASSIST: ABNORMAL /HPF
COLOR UR: YELLOW
GLUCOSE UR STRIP.AUTO-MCNC: NEGATIVE MG/DL
HYALINE CASTS #/AREA URNS AUTO: ABNORMAL /LPF
KETONES UR STRIP.AUTO-MCNC: NEGATIVE MG/DL
LEUKOCYTE ESTERASE UR QL STRIP.AUTO: ABNORMAL
MUCOUS THREADS #/AREA URNS AUTO: ABNORMAL /LPF
NITRITE UR QL STRIP.AUTO: NEGATIVE
PH UR STRIP.AUTO: 5 [PH]
PROT UR STRIP.AUTO-MCNC: NEGATIVE MG/DL
PSA SERPL-MCNC: 13.4 NG/ML
RBC # UR STRIP.AUTO: NEGATIVE /UL
RBC #/AREA URNS AUTO: ABNORMAL /HPF
SP GR UR STRIP.AUTO: 1.02
UROBILINOGEN UR STRIP.AUTO-MCNC: <2 MG/DL
WBC #/AREA URNS AUTO: ABNORMAL /HPF

## 2024-06-03 PROCEDURE — 36415 COLL VENOUS BLD VENIPUNCTURE: CPT

## 2024-06-03 PROCEDURE — 88112 CYTOPATH CELL ENHANCE TECH: CPT

## 2024-06-03 PROCEDURE — 84153 ASSAY OF PSA TOTAL: CPT

## 2024-06-03 PROCEDURE — 87086 URINE CULTURE/COLONY COUNT: CPT

## 2024-06-03 PROCEDURE — 81001 URINALYSIS AUTO W/SCOPE: CPT

## 2024-06-04 LAB — BACTERIA UR CULT: NO GROWTH

## 2024-06-05 LAB
LABORATORY COMMENT REPORT: NORMAL
LABORATORY COMMENT REPORT: NORMAL
PATH REPORT.FINAL DX SPEC: NORMAL
PATH REPORT.GROSS SPEC: NORMAL
PATH REPORT.RELEVANT HX SPEC: NORMAL
PATH REPORT.TOTAL CANCER: NORMAL

## 2024-06-07 ENCOUNTER — OFFICE VISIT (OUTPATIENT)
Dept: FAMILY MEDICINE CLINIC | Age: 79
End: 2024-06-07

## 2024-06-07 VITALS
SYSTOLIC BLOOD PRESSURE: 122 MMHG | RESPIRATION RATE: 16 BRPM | HEIGHT: 74 IN | HEART RATE: 68 BPM | OXYGEN SATURATION: 92 % | WEIGHT: 217 LBS | DIASTOLIC BLOOD PRESSURE: 68 MMHG | BODY MASS INDEX: 27.85 KG/M2

## 2024-06-07 DIAGNOSIS — I63.9 CEREBRAL INFARCTION, UNSPECIFIED MECHANISM (HCC): ICD-10-CM

## 2024-06-07 DIAGNOSIS — M05.9 RHEUMATOID ARTHRITIS WITH POSITIVE RHEUMATOID FACTOR, INVOLVING UNSPECIFIED SITE (HCC): ICD-10-CM

## 2024-06-07 DIAGNOSIS — J44.0 CHRONIC OBSTRUCTIVE PULMONARY DISEASE WITH ACUTE LOWER RESPIRATORY INFECTION (HCC): Primary | ICD-10-CM

## 2024-06-07 RX ORDER — CEFDINIR 300 MG/1
300 CAPSULE ORAL 2 TIMES DAILY
Qty: 20 CAPSULE | Refills: 0 | Status: SHIPPED | OUTPATIENT
Start: 2024-06-07 | End: 2024-06-17

## 2024-06-07 ASSESSMENT — ENCOUNTER SYMPTOMS
GASTROINTESTINAL NEGATIVE: 1
COUGH: 1
ALLERGIC/IMMUNOLOGIC NEGATIVE: 1
SHORTNESS OF BREATH: 0
EYES NEGATIVE: 1

## 2024-06-07 NOTE — PROGRESS NOTES
Conjunctiva/sclera: Conjunctivae normal.      Pupils: Pupils are equal, round, and reactive to light.   Neck:      Thyroid: No thyromegaly.   Cardiovascular:      Rate and Rhythm: Normal rate and regular rhythm.      Heart sounds: Normal heart sounds. No murmur heard.     No friction rub. No gallop.   Pulmonary:      Effort: Pulmonary effort is normal. No respiratory distress.      Breath sounds: Normal breath sounds. No wheezing.   Abdominal:      General: Bowel sounds are normal. There is no distension.      Palpations: Abdomen is soft. There is no mass.      Tenderness: There is no abdominal tenderness. There is no guarding or rebound.      Hernia: No hernia is present.   Genitourinary:     Penis: Normal.    Musculoskeletal:         General: No tenderness. Normal range of motion.      Cervical back: Normal range of motion and neck supple.   Lymphadenopathy:      Cervical: No cervical adenopathy.   Skin:     General: Skin is warm and dry.   Neurological:      Mental Status: He is alert and oriented to person, place, and time.      Cranial Nerves: No cranial nerve deficit.      Coordination: Coordination normal.         Assessment   Diagnosis Orders   1. Chronic obstructive pulmonary disease with acute lower respiratory infection (HCC)        2. Cerebral infarction, unspecified mechanism (HCC)        3. Rheumatoid arthritis with positive rheumatoid factor, involving unspecified site (HCC)          Problem List       Rheumatoid arthritis (HCC)    Relevant Medications    allopurinol (ZYLOPRIM) 100 MG tablet    Chronic obstructive pulmonary disease (HCC) - Primary    Relevant Medications    albuterol (PROVENTIL) nebulizer solution 2.5 mg    montelukast (SINGULAIR) 10 MG tablet       Plan  No orders of the defined types were placed in this encounter.    No orders of the defined types were placed in this encounter.    No follow-ups on file.  Jose Gonzalez MD

## 2024-06-11 ENCOUNTER — OFFICE VISIT (OUTPATIENT)
Dept: ORTHOPEDIC SURGERY | Facility: CLINIC | Age: 79
End: 2024-06-11
Payer: MEDICARE

## 2024-06-11 ENCOUNTER — HOSPITAL ENCOUNTER (OUTPATIENT)
Dept: RADIOLOGY | Facility: CLINIC | Age: 79
Discharge: HOME | End: 2024-06-11
Payer: MEDICARE

## 2024-06-11 DIAGNOSIS — M54.50 LOW BACK PAIN, UNSPECIFIED BACK PAIN LATERALITY, UNSPECIFIED CHRONICITY, UNSPECIFIED WHETHER SCIATICA PRESENT: ICD-10-CM

## 2024-06-11 DIAGNOSIS — M54.50 LOW BACK PAIN, UNSPECIFIED BACK PAIN LATERALITY, UNSPECIFIED CHRONICITY, UNSPECIFIED WHETHER SCIATICA PRESENT: Primary | ICD-10-CM

## 2024-06-11 PROCEDURE — 72100 X-RAY EXAM L-S SPINE 2/3 VWS: CPT

## 2024-06-11 PROCEDURE — 99213 OFFICE O/P EST LOW 20 MIN: CPT | Performed by: ORTHOPAEDIC SURGERY

## 2024-06-11 PROCEDURE — 72100 X-RAY EXAM L-S SPINE 2/3 VWS: CPT | Performed by: ORTHOPAEDIC SURGERY

## 2024-06-11 NOTE — PROGRESS NOTES
Russ Lagos is a 78 y.o. male who presents for Post-op of the Lower Back (2/21/24 rev L4-5 MIS - TLIF 3 month 21 days/xray).    HPI:  78-year-old gentleman here for postop visit.  He is 3 months out from an L4-5 MIS TLIF.  He denies any fever chills nausea vomiting night sweats.  He has no bowel or bladder complaints.    Physical exam:  Well-nourished, well kept.No lymphangitis or lymphadenopathy in the examined extremities.  Gait normal.  Can stand on heels and toes.   Examination of the back shows tenderness in the paraspinous musculature.  There is mildly decreased range of motion in all directions due to guarding/muscle spasms and pain at extremes.  There is good strength and no instability.  Examination of the lower extremities reveals no point tenderness, swelling, or deformity.  Range of motion of the hips, knees, and ankles are full without crepitance, instability, or exacerbation of pain.  Strength is 5/5 throughout.  No redness, abrasions, or lesions on extremities  Gross sensation intact in the extremities.  Affect normal.  Alert and oriented ×3.  Coordination normal.  Incisions are very well-healed.    Imaging studies:  AP lateral plain films of the lumbar spine were ordered and reviewed today.    Assessment:  78-year-old gentleman here for postop visit.  He is 3 months out from an L4-5 MIS TLIF.  He had a prior L2-S1 midline laminectomy in 2021.  He is still noticing some back pain, especially when he is active.  He is not describing any leg pain except he has bilateral knee stiffness and aches.  Overall he feels like his pain level was maybe a 3 or a 4 on average but it is mostly in his back.    For complete plan and/or surgical details, please refer to Dr. Norton's portion of this split dictation.    -Marc Segura PA-C    In a face-to-face encounter, I performed a history and physical examination, discussed pertinent diagnostic studies if indicated, and discussed diagnosis and  management strategies with both the patient and the midlevel provider.  I reviewed the midlevel's note and agree with the documented findings and plan of care.    3 months out from an L4-5 MIS TLIF on a patient who had a prior L2-S1 midline laminectomy in 2021.  The buttock and leg symptoms he was having preoperatively are gone.  He now has just some back pain.  He has no interest in physical therapy.  His x-rays look good as far as the positioning of his hardware.  He just wants to live with his symptoms.  He only has back pain when he gets up and does more aggressive type activities.  When he is sitting he has no pain at all.  He is walking at least a half a mile a day.  Will let him engage in activities as tolerated and he can follow-up with us in 3 months for AP lateral x-rays of the lumbar spine.  We have ordered and reviewed x-rays today.    Brett Norton MD  Orthopedic surgery

## 2024-06-18 ENCOUNTER — APPOINTMENT (OUTPATIENT)
Dept: UROLOGY | Facility: CLINIC | Age: 79
End: 2024-06-18
Payer: MEDICARE

## 2024-06-18 VITALS
WEIGHT: 213.85 LBS | HEIGHT: 72 IN | BODY MASS INDEX: 28.96 KG/M2 | RESPIRATION RATE: 16 BRPM | HEART RATE: 72 BPM | DIASTOLIC BLOOD PRESSURE: 64 MMHG | SYSTOLIC BLOOD PRESSURE: 122 MMHG

## 2024-06-18 DIAGNOSIS — R97.20 HIGH PROSTATE SPECIFIC ANTIGEN (PSA): Primary | ICD-10-CM

## 2024-06-18 DIAGNOSIS — R39.15 URINARY URGENCY: ICD-10-CM

## 2024-06-18 DIAGNOSIS — R35.0 URINARY FREQUENCY: ICD-10-CM

## 2024-06-18 DIAGNOSIS — R97.20 HIGH PROSTATE SPECIFIC ANTIGEN (PSA): ICD-10-CM

## 2024-06-18 DIAGNOSIS — R82.89 ABNORMAL URINE CYTOLOGY: ICD-10-CM

## 2024-06-18 PROCEDURE — 1160F RVW MEDS BY RX/DR IN RCRD: CPT | Performed by: UROLOGY

## 2024-06-18 PROCEDURE — 99213 OFFICE O/P EST LOW 20 MIN: CPT | Performed by: UROLOGY

## 2024-06-18 PROCEDURE — 1159F MED LIST DOCD IN RCRD: CPT | Performed by: UROLOGY

## 2024-06-18 PROCEDURE — 3078F DIAST BP <80 MM HG: CPT | Performed by: UROLOGY

## 2024-06-18 PROCEDURE — 1036F TOBACCO NON-USER: CPT | Performed by: UROLOGY

## 2024-06-18 PROCEDURE — 1126F AMNT PAIN NOTED NONE PRSNT: CPT | Performed by: UROLOGY

## 2024-06-18 PROCEDURE — 51798 US URINE CAPACITY MEASURE: CPT | Performed by: UROLOGY

## 2024-06-18 PROCEDURE — 3074F SYST BP LT 130 MM HG: CPT | Performed by: UROLOGY

## 2024-06-18 RX ORDER — TAMSULOSIN HYDROCHLORIDE 0.4 MG/1
0.4 CAPSULE ORAL DAILY
Qty: 90 CAPSULE | Refills: 3 | Status: SHIPPED | OUTPATIENT
Start: 2024-06-18

## 2024-06-18 ASSESSMENT — ENCOUNTER SYMPTOMS
HEMATURIA: 0
DIFFICULTY URINATING: 0
DYSURIA: 0
FREQUENCY: 0

## 2024-06-18 ASSESSMENT — PAIN SCALES - GENERAL: PAINLEVEL: 0-NO PAIN

## 2024-06-18 NOTE — LETTER
June 18, 2024     Jah Wilks MD  [address] [facility] [id number]    Patient: Russ Lagos   YOB: 1945   Date of Visit: 6/18/2024       Dear Dr. Jah Wilks MD:    Thank you for referring Russ Lagos to me for evaluation. Below are my notes for this consultation.  If you have questions, please do not hesitate to call me. I look forward to following your patient along with you.       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 No family history of prostate or breast cancer. 20-pack-year cigarette smoking history.     June 2000, PSA 3.7, TRUS biopsy (6) BPH     May of 2004, PSA 6.6, biopsy (8), BPH     August 2007, PSA 4.4, biopsy (10), BPH     01/12/12, PSA 9.1, biopsy (12) revealed ASAP     May 9 2012, biopsy (14) revealed BPH.     06/24/14 patient presented with significant ORCHALGIA.     12/05/14, PSA was 8.0, last PSA in his last biopsy was done at 9.1. Urine cytologies again ATYPICAL. Scrotal ultrasound is negative.     06/08/15, PSA 11.2. Patient states that he has had some recent discomfort which may be related to prostatitis or even epididymitis. 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 We will have a trial of Cipro for 2 weeks and repeat PSA.     10/19/15, patient had an outside PSA which was reportedly ELEVATED. We have repeated it and it is 7.7 in our laboratory. This is clearly within range of his 2012 biopsy which revealed BPH, when the PSA was 9.1. Of note, the patient is stating that he has URINARY FREQUENCY every hour, NOCTURIA Ã--3, and URGENCY. He states that these are new symptoms over the last 4-6 months. I have offered him a cystoscopy with urodynamic studies. He would  like to review our brochures and discuss at our next appointment.     12/29/15, the patient states that his frequency and urgency have abated. He only has NOCTURIA Ã--1 at this point. He refuses evaluation with cystoscopy and urodynamics. PSA is 5.5 which is well within range of his last biopsy. Cytology is negative. Testicular ultrasound shows a right epididymal head complex cyst which is most probably benign. He will return in June 2016 for his annual visit.     06/24/16 patient without any complaints. PSA 7.0, within limits of his 01/12/12 biopsy when the PSA was 9.1. Cytology negative. Testicular ultrasound once again shows a septated right EPIDIDYMAL HEAD CYST without change. He will return in 6 months with a PSA.     12/21/16, PSA 7.4 which is within range of his 01/12/12 biopsy when the PSA was 9.1. Once again, he is complaining of URGENCY AND FREQUENCY but is not interested in a cystoscopy with urodynamics he is also complaining of foul-smelling urine. We will obtain a urinalysis and urine culture and I will prescribe a 5 day course of Macrobid. When he returns in June, we will order a flow rate and PVR in addition to his PSA, cytology and testicular ultrasound.     06/06/17, PSA is 11.0 which is higher than the PSA of 9.1 when he had a biopsy on 01/12/12 which was benign. We will try 2 weeks course of antibiotics and retake the PSA. If it is above 10.1, we will recommend a TRUS biopsy (14). Patient no longer is complaining of urgency or frequency. Testicular ultrasound shows stable right COMPLEX EPIDIDYMAL CYST. Urine cytology is negative.     12/15/17, PSA is 25.6, which is higher than the PSA of 9.1 when he had his last biopsy on 01/12/12, which proved to be benign. 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 We will try Cipro for 2 weeks and repeat the PSA and make a decision at that point. Clearly if it is the same or higher we  will proceed with another biopsy, TRUS biopsy (14). However if it decreases we will either continue the Cipro is a lower dose and repeat PSA or wait until June of next year.     01/07/18, following 2 weeks of Cipro, PSA decreased to 17.2     02/02/18 following 2 more weeks of Cipro, PSA down to 11.4.     06/06/18, patient is complaining of generalized FATIGUE AND WEAKNESS. His blood pressure is now at 100 for systolic which is quite low for him. He will see Dr. Laboy, his cardiologist tomorrow to reevaluate. In addition, his PSA once again is elevated to 26.2. 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 We will give him a trial of Cipro at the 500 mg dose for 2 weeks and repeat Cipro and repeat the PSA. If it is still elevated, I would recommend an MRI guided prostate biopsy with Dr. Arriaza. Testicular ultrasound shows no change and his urine cytology was negative.     07/25/18, patient saw Dr. Arriaza, MRI was ordered: Negative for abnormalities. Therefore patient will return to my care.     12/05/18, PSA is 22.5. His within range of his PSAs over the past year. His MRI was negative for months ago. We will have another PSA in 6 months and I will obtain an [employer] urine test a month prior to his visit. He is now complaining of some urgency, frequency and nocturia. We will discuss a cystoscopy with urodynamic studies at his next visit.     06/07/19, PSA is now 35.50. [employer] urine test is 28.86 which is a high risk for high grade prostate cancer. Cytology is negative. Culture is negative. He will be scheduled for a TRUS biopsy, he wishes to have this performed under anesthesia. This is been scheduled for 06/13/19 and Dr. Laboy will provide preoperative cardiac risk stratification.     06/13/19, OR, TRUS biopsy. PSA 35.50 volume 153.0 cc. Pathology: BPH.     06/19/19, telephone conversation, preoperative chest x-ray showed a nodule     07/08/19, telephone conversation, CAT  scan of the chest of the right lung nodules are benign.     12/14/19, patient arrives alone. He states that he is now experiencing URINARY FREQUENCY, URGENCY, AND INCOMPLETE EMPTYING. He does have a 153.0 g prostate on ultrasound. We will pursue a cystoscopy with urodynamic studies. He may benefit from an alpha agent, with consideration to hypotension, a 5 alpha reductase inhibitor or both. His most recent PSA is 24.50 which is significantly less then his PSA of 35.50 on 06/13/19 where he underwent a negative biopsy.     10/26/20, cystoscopy today reveals a severely obstructed prostatic urethra and a severely elevated median lobe. Multiple trabeculations and small diverticulum within the bladder. Flow rate of 3 cc/s with a total volume of 23 cc. PVR 15 cc's. Patient states that his urinary frequency in the daytime is the major issue urinating approximately every hour. His nocturia is vacillating between times one to Ã--5. I will initiate Flomax 0.4 mg daily. He will return in 3 months and we will consider adding Proscar if necessary.     02/03/21, patient arrives alone. He states that since starting Flomax, his urinary frequency has changed from every hour during the daytime to every 2-1/2 hours. Nocturia is now down from Ã--5 to Ã--1. He did not have much urine today, only 28 cc. Therefore his flow rate was 3 cc with a PVR of 80 cc. PSA is 13.5 which is dramatically lower then his PSA of 35.50 on 06/13/19 when he underwent a negative prostate biopsy. Urine cytologies lacking atypia. Testicular ultrasound shows stable bilateral epididymal cysts. We will no longer proceed with ultrasounds. He does state that he notices an odor to his urine and we will now start checking his UA and culture. If he starts having more urinary symptoms we can add a 5 alpha reductase inhibitor in the future.     June 4, 2021, patient arrives alone. He states that Flomax is working with decreased urgency and frequency and nocturia has  decreased to x1. He states that this is associated with fluid intake and activity. Flow rate of 8 cc/s, total volume 41 cc and PVR 37 cc. PSA is 15.90 which is lower than his PSA of 35.50 on June 13, 2019 when he underwent a negative prostate biopsy. Urinalysis and urine culture were negative. Urine cytology was not performed due to laboratory indiscretion. He will return in 1 year.     August 22, 2021, OR, Dr. Brett Norton, lumbar surgery     December 21, 2021, patient calls complaining of hesitancy and incomplete emptying. Believes he has a UTI. Macrobid ordered. UA and CNS were negative.     June 8, 2022, patient arrives alone. He states that his urinary urgency and frequency has returned. He states his nocturia x3 has returned. He continues on daily Flomax and I will now add Proscar to his regimen. He will return in 6 months. If he still has the same symptoms, we will have him see Dr. Jon George in consultation for minimally invasive prostate surgery. Today's flow rate 7 cc/s, total volume 71 cc, no PVR due to mechanical failure. PSA is 17.49 which is lower than his PSA of 35.50 on June 13, 2019 when he underwent a negative prostate biopsy. Urinalysis shows 1 red blood cell. Urine culture and urine cytology were ordered but not performed.     June 27, 2022, telephone call, patient complains of bladder pain and pressure. We obtained a urinalysis and urine culture which was negative.     August 3, 2022, telephone call, patient states symptoms have improved and he no longer has any issues. He continues to take Flomax and Proscar.     January 17, 2023, patient arrives alone. He has lost 30 pounds as a side effect of one of his medications from pain management. He is discontinued that medicine and has stabilized. Dr. Laboy his cardiologist is manipulating his antihypertensive medication and he does occasionally have dizziness. He has been taking Flomax for over 3 years without side effect. It is most probably  secondary to his weight loss and other medications. Today, now that he is on both Flomax and Proscar, his parameters are excellent. Flow rate of 10 cc/s, total volume 105 cc, PVR 17 cc. Urinalysis is negative for blood. Renal colic CAT scan does not identify any stones in the urinary tract. He will return in 6 months     July 28, 2023, patient arrives alone. He continues on Flomax and Proscar with excellent results. Flow rate of 12 cc/s with a PVR of 7 cc. Urinalysis is negative for blood, urine culture no growth, urine cytology is lacking atypia. Corrected PSA is 13.28 which is significantly lower than his PSA of 35.50 on June 13, 2019 when he underwent a negative prostate biopsy. He will return in December.     August 8, 2023, patient admitted to [facility] for transient aphasia     December 2023, patient discontinued Proscar secondary to gynecomastia.     PLAVIX     February 2, 2024, patient arrives alone.  He continues on Flomax alone with a flow rate 10 cc/s, total volume 212 cc,  cc.  He is now developed CKD stage III.  Corrected PSA is 11.10 which is significantly lower than the PSA 35.50 on June 13, 2019 when he underwent a negative prostate biopsy.  He will return in June.    February 21, 2024, OR, Dr. Brett Norton, lumbar surgery    June 18, 2024, patient arrives alone.  He continues on Flomax alone.  PVR today is 0 cc.  On June 13, 2019, with a PSA of 35.50, he underwent a TRUS biopsy showing a volume of 153 cc.  Pathology was negative at that time.  PSA now is 13.40.  He has no new urologic complaints.  Urinalysis no blood.  Urine culture no growth.  Urine cytology was lacking atypia.  He will return in December.     PLAN:     #1 patient will return in December 2024 with a PVR and PSA     Also in June 2025 with a PSA, urine studies, flow rate and PVR.      #2 continue Flomax 0.4 mg by mouth daily      Physical Exam  Vitals and nursing note reviewed.   Constitutional:       Appearance: Normal  appearance.   HENT:      Head: Normocephalic and atraumatic.   Pulmonary:      Effort: Pulmonary effort is normal.   Abdominal:      Palpations: Abdomen is soft.      Tenderness: There is no abdominal tenderness.   Musculoskeletal:         General: Normal range of motion.      Cervical back: Normal range of motion and neck supple.   Neurological:      General: No focal deficit present.      Mental Status: He is alert and oriented to person, place, and time.   Psychiatric:         Mood and Affect: Mood normal.         Behavior: Behavior normal.        This note was created with voice-recognition software and was not corrected for typographical or grammatical errors.

## 2024-06-18 NOTE — PROGRESS NOTES
Patient denies any pain today. Patient had lumbar surgery with Dr. Norton on 2/21/24. Patient denies any concerns about falling or safety. Patient has occasional nocturia x4 and urgency, not sure if its due to eating. Patient taking Flomax as directed. CV     Review of Systems   Genitourinary:  Positive for enuresis and urgency. Negative for decreased urine volume, difficulty urinating, dysuria, frequency and hematuria.   All other systems reviewed and are negative.

## 2024-06-18 NOTE — PROGRESS NOTES
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 No family history of prostate or breast cancer. 20-pack-year cigarette smoking history.     June 2000, PSA 3.7, TRUS biopsy (6) BPH     May of 2004, PSA 6.6, biopsy (8), BPH     August 2007, PSA 4.4, biopsy (10), BPH     01/12/12, PSA 9.1, biopsy (12) revealed ASAP     May 9 2012, biopsy (14) revealed BPH.     06/24/14 patient presented with significant ORCHALGIA.     12/05/14, PSA was 8.0, last PSA in his last biopsy was done at 9.1. Urine cytologies again ATYPICAL. Scrotal ultrasound is negative.     06/08/15, PSA 11.2. Patient states that he has had some recent discomfort which may be related to prostatitis or even epididymitis. 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 We will have a trial of Cipro for 2 weeks and repeat PSA.     10/19/15, patient had an outside PSA which was reportedly ELEVATED. We have repeated it and it is 7.7 in our laboratory. This is clearly within range of his 2012 biopsy which revealed BPH, when the PSA was 9.1. Of note, the patient is stating that he has URINARY FREQUENCY every hour, NOCTURIA Ã--3, and URGENCY. He states that these are new symptoms over the last 4-6 months. I have offered him a cystoscopy with urodynamic studies. He would like to review our brochures and discuss at our next appointment.     12/29/15, the patient states that his frequency and urgency have abated. He only has NOCTURIA Ã--1 at this point. He refuses evaluation with cystoscopy and urodynamics. PSA is 5.5 which is well within range of his last biopsy. Cytology is negative. Testicular ultrasound shows a right epididymal head complex cyst which is most probably benign. He will return in June 2016 for his annual visit.     06/24/16 patient without any complaints. PSA 7.0, within limits of his 01/12/12 biopsy when the PSA was 9.1. Cytology negative. Testicular ultrasound once  again shows a septated right EPIDIDYMAL HEAD CYST without change. He will return in 6 months with a PSA.     12/21/16, PSA 7.4 which is within range of his 01/12/12 biopsy when the PSA was 9.1. Once again, he is complaining of URGENCY AND FREQUENCY but is not interested in a cystoscopy with urodynamics he is also complaining of foul-smelling urine. We will obtain a urinalysis and urine culture and I will prescribe a 5 day course of Macrobid. When he returns in June, we will order a flow rate and PVR in addition to his PSA, cytology and testicular ultrasound.     06/06/17, PSA is 11.0 which is higher than the PSA of 9.1 when he had a biopsy on 01/12/12 which was benign. We will try 2 weeks course of antibiotics and retake the PSA. If it is above 10.1, we will recommend a TRUS biopsy (14). Patient no longer is complaining of urgency or frequency. Testicular ultrasound shows stable right COMPLEX EPIDIDYMAL CYST. Urine cytology is negative.     12/15/17, PSA is 25.6, which is higher than the PSA of 9.1 when he had his last biopsy on 01/12/12, which proved to be benign. 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 We will try Cipro for 2 weeks and repeat the PSA and make a decision at that point. Clearly if it is the same or higher we will proceed with another biopsy, TRUS biopsy (14). However if it decreases we will either continue the Cipro is a lower dose and repeat PSA or wait until June of next year.     01/07/18, following 2 weeks of Cipro, PSA decreased to 17.2     02/02/18 following 2 more weeks of Cipro, PSA down to 11.4.     06/06/18, patient is complaining of generalized FATIGUE AND WEAKNESS. His blood pressure is now at 100 for systolic which is quite low for him. He will see Dr. Laboy, his cardiologist tomorrow to reevaluate. In addition, his PSA once again is elevated to 26.2. 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 We will give him a trial of Cipro at the 500 mg dose for 2 weeks and repeat Cipro and repeat the PSA. If it is still elevated, I would recommend an MRI guided prostate biopsy with Dr. Arriaza. Testicular ultrasound shows no change and his urine cytology was negative.     07/25/18, patient saw Dr. Arriaza, MRI was ordered: Negative for abnormalities. Therefore patient will return to my care.     12/05/18, PSA is 22.5. His within range of his PSAs over the past year. His MRI was negative for months ago. We will have another PSA in 6 months and I will obtain an [employer] urine test a month prior to his visit. He is now complaining of some urgency, frequency and nocturia. We will discuss a cystoscopy with urodynamic studies at his next visit.     06/07/19, PSA is now 35.50. [employer] urine test is 28.86 which is a high risk for high grade prostate cancer. Cytology is negative. Culture is negative. He will be scheduled for a TRUS biopsy, he wishes to have this performed under anesthesia. This is been scheduled for 06/13/19 and Dr. Laboy will provide preoperative cardiac risk stratification.     06/13/19, OR, TRUS biopsy. PSA 35.50 volume 153.0 cc. Pathology: BPH.     06/19/19, telephone conversation, preoperative chest x-ray showed a nodule     07/08/19, telephone conversation, CAT scan of the chest of the right lung nodules are benign.     12/14/19, patient arrives alone. He states that he is now experiencing URINARY FREQUENCY, URGENCY, AND INCOMPLETE EMPTYING. He does have a 153.0 g prostate on ultrasound. We will pursue a cystoscopy with urodynamic studies. He may benefit from an alpha agent, with consideration to hypotension, a 5 alpha reductase inhibitor or both. His most recent PSA is 24.50 which is significantly less then his PSA of 35.50 on 06/13/19 where he underwent a negative biopsy.     10/26/20, cystoscopy today reveals a severely obstructed prostatic urethra and a severely elevated  median lobe. Multiple trabeculations and small diverticulum within the bladder. Flow rate of 3 cc/s with a total volume of 23 cc. PVR 15 cc's. Patient states that his urinary frequency in the daytime is the major issue urinating approximately every hour. His nocturia is vacillating between times one to Ã--5. I will initiate Flomax 0.4 mg daily. He will return in 3 months and we will consider adding Proscar if necessary.     02/03/21, patient arrives alone. He states that since starting Flomax, his urinary frequency has changed from every hour during the daytime to every 2-1/2 hours. Nocturia is now down from Ã--5 to Ã--1. He did not have much urine today, only 28 cc. Therefore his flow rate was 3 cc with a PVR of 80 cc. PSA is 13.5 which is dramatically lower then his PSA of 35.50 on 06/13/19 when he underwent a negative prostate biopsy. Urine cytologies lacking atypia. Testicular ultrasound shows stable bilateral epididymal cysts. We will no longer proceed with ultrasounds. He does state that he notices an odor to his urine and we will now start checking his UA and culture. If he starts having more urinary symptoms we can add a 5 alpha reductase inhibitor in the future.     June 4, 2021, patient arrives alone. He states that Flomax is working with decreased urgency and frequency and nocturia has decreased to x1. He states that this is associated with fluid intake and activity. Flow rate of 8 cc/s, total volume 41 cc and PVR 37 cc. PSA is 15.90 which is lower than his PSA of 35.50 on June 13, 2019 when he underwent a negative prostate biopsy. Urinalysis and urine culture were negative. Urine cytology was not performed due to laboratory indiscretion. He will return in 1 year.     August 22, 2021, OR, Dr. Brett Norton, lumbar surgery     December 21, 2021, patient calls complaining of hesitancy and incomplete emptying. Believes he has a UTI. Macrobid ordered. UA and CNS were negative.     June 8, 2022, patient  arrives alone. He states that his urinary urgency and frequency has returned. He states his nocturia x3 has returned. He continues on daily Flomax and I will now add Proscar to his regimen. He will return in 6 months. If he still has the same symptoms, we will have him see Dr. Jon George in consultation for minimally invasive prostate surgery. Today's flow rate 7 cc/s, total volume 71 cc, no PVR due to mechanical failure. PSA is 17.49 which is lower than his PSA of 35.50 on June 13, 2019 when he underwent a negative prostate biopsy. Urinalysis shows 1 red blood cell. Urine culture and urine cytology were ordered but not performed.     June 27, 2022, telephone call, patient complains of bladder pain and pressure. We obtained a urinalysis and urine culture which was negative.     August 3, 2022, telephone call, patient states symptoms have improved and he no longer has any issues. He continues to take Flomax and Proscar.     January 17, 2023, patient arrives alone. He has lost 30 pounds as a side effect of one of his medications from pain management. He is discontinued that medicine and has stabilized. Dr. Laboy his cardiologist is manipulating his antihypertensive medication and he does occasionally have dizziness. He has been taking Flomax for over 3 years without side effect. It is most probably secondary to his weight loss and other medications. Today, now that he is on both Flomax and Proscar, his parameters are excellent. Flow rate of 10 cc/s, total volume 105 cc, PVR 17 cc. Urinalysis is negative for blood. Renal colic CAT scan does not identify any stones in the urinary tract. He will return in 6 months     July 28, 2023, patient arrives alone. He continues on Flomax and Proscar with excellent results. Flow rate of 12 cc/s with a PVR of 7 cc. Urinalysis is negative for blood, urine culture no growth, urine cytology is lacking atypia. Corrected PSA is 13.28 which is significantly lower than his PSA of 35.50  on June 13, 2019 when he underwent a negative prostate biopsy. He will return in December.     August 8, 2023, patient admitted to [facility] for transient aphasia     December 2023, patient discontinued Proscar secondary to gynecomastia.     PLAVIX     February 2, 2024, patient arrives alone.  He continues on Flomax alone with a flow rate 10 cc/s, total volume 212 cc,  cc.  He is now developed CKD stage III.  Corrected PSA is 11.10 which is significantly lower than the PSA 35.50 on June 13, 2019 when he underwent a negative prostate biopsy.  He will return in June.    February 21, 2024, OR, Dr. Brett Norton, lumbar surgery    June 18, 2024, patient arrives alone.  He continues on Flomax alone.  PVR today is 0 cc.  On June 13, 2019, with a PSA of 35.50, he underwent a TRUS biopsy showing a volume of 153 cc.  Pathology was negative at that time.  PSA now is 13.40.  He has no new urologic complaints.  Urinalysis no blood.  Urine culture no growth.  Urine cytology was lacking atypia.  He will return in December.     PLAN:     #1 patient will return in December 2024 with a PVR and PSA     Also in June 2025 with a PSA, urine studies, flow rate and PVR.      #2 continue Flomax 0.4 mg by mouth daily      Physical Exam  Vitals and nursing note reviewed.   Constitutional:       Appearance: Normal appearance.   HENT:      Head: Normocephalic and atraumatic.   Pulmonary:      Effort: Pulmonary effort is normal.   Abdominal:      Palpations: Abdomen is soft.      Tenderness: There is no abdominal tenderness.   Musculoskeletal:         General: Normal range of motion.      Cervical back: Normal range of motion and neck supple.   Neurological:      General: No focal deficit present.      Mental Status: He is alert and oriented to person, place, and time.   Psychiatric:         Mood and Affect: Mood normal.         Behavior: Behavior normal.        This note was created with voice-recognition software and was not corrected  for typographical or grammatical errors.

## 2024-06-18 NOTE — PATIENT INSTRUCTIONS
Patient Discussion/Summary     It was good to see you once again.  You have been taking Flomax alone and your residuals today are only 0 cc.  Your PSA, is stable at 13.40, is significantly lower than your PSA in 2019 of 35.5 when you had a prostate biopsy which was negative.  Your urine studies were all normal.  We will see you again in December      This note was created with voice-recognition software and was not corrected for typographical or grammatical errors

## 2024-09-10 ENCOUNTER — HOSPITAL ENCOUNTER (OUTPATIENT)
Dept: RADIOLOGY | Facility: CLINIC | Age: 79
Discharge: HOME | End: 2024-09-10
Payer: MEDICARE

## 2024-09-10 ENCOUNTER — OFFICE VISIT (OUTPATIENT)
Dept: ORTHOPEDIC SURGERY | Facility: CLINIC | Age: 79
End: 2024-09-10
Payer: MEDICARE

## 2024-09-10 ENCOUNTER — APPOINTMENT (OUTPATIENT)
Dept: ORTHOPEDIC SURGERY | Facility: CLINIC | Age: 79
End: 2024-09-10
Payer: MEDICARE

## 2024-09-10 DIAGNOSIS — M54.50 LOW BACK PAIN, UNSPECIFIED BACK PAIN LATERALITY, UNSPECIFIED CHRONICITY, UNSPECIFIED WHETHER SCIATICA PRESENT: ICD-10-CM

## 2024-09-10 DIAGNOSIS — M54.50 LOW BACK PAIN, UNSPECIFIED BACK PAIN LATERALITY, UNSPECIFIED CHRONICITY, UNSPECIFIED WHETHER SCIATICA PRESENT: Primary | ICD-10-CM

## 2024-09-10 PROCEDURE — 72100 X-RAY EXAM L-S SPINE 2/3 VWS: CPT | Performed by: ORTHOPAEDIC SURGERY

## 2024-09-10 PROCEDURE — 72100 X-RAY EXAM L-S SPINE 2/3 VWS: CPT

## 2024-09-10 PROCEDURE — 99213 OFFICE O/P EST LOW 20 MIN: CPT | Performed by: ORTHOPAEDIC SURGERY

## 2024-09-10 NOTE — PROGRESS NOTES
Russ Lagos is a 79 y.o. male who presents for Follow-up of the Lower Back (2/21/24 Revision L4-5 MIS TLIF/6 1/2 months out/X-rays today).    HPI:  79-year-old gentleman here for surgical follow-up.  He is 6-1/2 months out from an L4-5 revision MIS TLIF.  He denies any fever chills nausea vomiting night sweats.  He has no bowel or bladder complaints.    Physical exam:  Well-nourished, well kept.No lymphangitis or lymphadenopathy in the examined extremities.  Gait normal.  Can stand on heels and toes.   Examination of the back shows mild tenderness in the paraspinous musculature.  There is no significant decreased range of motion in all directions due to guarding/muscle spasms and pain at extremes.  There is good strength and no instability.  Examination of the lower extremities reveals no point tenderness, swelling, or deformity.  Range of motion of the hips, knees, and ankles are full without crepitance, instability, or exacerbation of pain.  Strength is 5/5 throughout.  No redness, abrasions, or lesions on extremities  Gross sensation intact in the extremities.  Affect normal.  Alert and oriented ×3.  Coordination normal.    Imaging studies:  AP lateral plain films of the lumbar spine were ordered and reviewed today.    Assessment:  79-year-old gentleman here for surgical follow-up.  He is 6-1/2 months out from a revision L4-5 MIS TLIF after a previous L2-S1 midline laminectomy in 2021.  He still having some back ache, but he does notice improvement since his surgery.  He used to have a lot of difficulty sitting down bending over and putting on his socks prior to his TLIF, he does notice that has improved since surgery.  His legs are doing fine.  He did not get into any physical therapy after his surgery.  Overall he is doing well.    For complete plan and/or surgical details, please refer to Dr. Norton's portion of this split dictation.    -Marc Segura PA-C    In a face-to-face encounter, I  performed a history and physical examination, discussed pertinent diagnostic studies if indicated, and discussed diagnosis and management strategies with both the patient and the midlevel provider.  I reviewed the midlevel's note and agree with the documented findings and plan of care.    6 months out L4-5 MIS TLIF which was a revision after a prior L2-S1 midline laminectomy.  His preoperative symptoms were left greater than right leg pain and back pain.  He does not have any leg pain anymore.  Does not recall that he even had preoperative leg pain.  But he does have back achiness and stiffness.  Overall he is at least 40% better with regards to his back pain.  Based on the fact that he says he does not have any leg pain is 100s and better with regards to that.  His x-rays look good.  We are going to let him continue to engage in activities as tolerated and he can follow-up with us in 6 months for AP lateral x-rays of the lumbar spine.  We ordered and reviewed x-rays today.  He is chronic stable problem of back pain.    Brett Norton MD  Orthopedic surgery

## 2024-10-10 ENCOUNTER — APPOINTMENT (OUTPATIENT)
Dept: CARDIOLOGY | Facility: CLINIC | Age: 79
End: 2024-10-10
Payer: MEDICARE

## 2024-10-22 ENCOUNTER — APPOINTMENT (OUTPATIENT)
Dept: CARDIOLOGY | Facility: CLINIC | Age: 79
End: 2024-10-22
Payer: MEDICARE

## 2024-10-22 VITALS
DIASTOLIC BLOOD PRESSURE: 60 MMHG | HEART RATE: 82 BPM | BODY MASS INDEX: 28.59 KG/M2 | HEIGHT: 72 IN | SYSTOLIC BLOOD PRESSURE: 102 MMHG | WEIGHT: 211.1 LBS

## 2024-10-22 DIAGNOSIS — I10 PRIMARY HYPERTENSION: ICD-10-CM

## 2024-10-22 DIAGNOSIS — I63.9 ACUTE CVA (CEREBROVASCULAR ACCIDENT) (MULTI): ICD-10-CM

## 2024-10-22 DIAGNOSIS — Z87.891 FORMER SMOKER: ICD-10-CM

## 2024-10-22 DIAGNOSIS — I25.10 ATHEROSCLEROSIS OF NATIVE CORONARY ARTERY OF NATIVE HEART WITHOUT ANGINA PECTORIS: ICD-10-CM

## 2024-10-22 DIAGNOSIS — E78.2 MIXED HYPERLIPIDEMIA: ICD-10-CM

## 2024-10-22 DIAGNOSIS — I21.4 NON-Q WAVE MYOCARDIAL INFARCTION (MULTI): ICD-10-CM

## 2024-10-22 PROCEDURE — 3074F SYST BP LT 130 MM HG: CPT | Performed by: INTERNAL MEDICINE

## 2024-10-22 PROCEDURE — 1159F MED LIST DOCD IN RCRD: CPT | Performed by: INTERNAL MEDICINE

## 2024-10-22 PROCEDURE — 3078F DIAST BP <80 MM HG: CPT | Performed by: INTERNAL MEDICINE

## 2024-10-22 PROCEDURE — 1036F TOBACCO NON-USER: CPT | Performed by: INTERNAL MEDICINE

## 2024-10-22 PROCEDURE — 99214 OFFICE O/P EST MOD 30 MIN: CPT | Performed by: INTERNAL MEDICINE

## 2024-10-22 RX ORDER — CLOPIDOGREL BISULFATE 75 MG/1
75 TABLET ORAL DAILY
Qty: 90 TABLET | Refills: 3 | Status: SHIPPED | OUTPATIENT
Start: 2024-10-22 | End: 2025-10-22

## 2024-10-22 RX ORDER — METOPROLOL SUCCINATE 25 MG/1
25 TABLET, EXTENDED RELEASE ORAL DAILY
Qty: 90 TABLET | Refills: 3 | Status: SHIPPED | OUTPATIENT
Start: 2024-10-22 | End: 2025-10-22

## 2024-10-22 RX ORDER — ALLOPURINOL 100 MG/1
100 TABLET ORAL DAILY
COMMUNITY

## 2024-10-22 NOTE — PATIENT INSTRUCTIONS
STOP Lisinopril  Follow up office visit in 6 months.    Continue same medications/treatment.  Patient educated on proper medication use.  Patient educated on risk factor modification.  Please bring any lab results from other providers / physicians to your next appointment.    Please bring all medicines, vitamins and herbal supplements with you when you come to the office.    Prescriptions will not be filled unless you are compliant with your follow up appointments or have a follow up  appointment scheduled as per instruction of your physician.  Refills should be requested at the time of  Your visit.    Bertha CORTES LPN, am scribing for and in the presence of  Dr. Cristiano Laboy MD, FACC

## 2024-10-22 NOTE — PROGRESS NOTES
Referred by Dr. Shaikh ref. provider found provider found for   Chief Complaint   Patient presents with    Follow-up     6 month follow up.        History of Present Illness  Russ Lagos is a 79 y.o. year old male patient here for follow-up.  Doing well from cardiac standpoint no complaint apparently he had these to be exacerbation of COPD requiring prednisone treatment.  He is doing better now and symptoms of syncope or presyncope.  His blood pressure is somewhat low and he states that his pressures been on the lower side therefore I will stop his lisinopril and continue other medication will call for any problem and follow-up as scheduled    Past Medical History  Past Medical History:   Diagnosis Date    Acid reflux     Allergy status to unspecified drugs, medicaments and biological substances     History of seasonal allergies    Arrhythmia     NSVT and PSVT    Chronic pain disorder     CKD (chronic kidney disease)     stage 2-3    COPD (chronic obstructive pulmonary disease) (Multi)     Coronary artery disease     with PCI x3    Family history of prostate problems     Heart attack     Hypertension     Lumbar disc disease     Old myocardial infarction     History of myocardial infarction    Peripheral neuropathy     Personal history of urinary calculi     History of renal calculi    PMR (polymyalgia rheumatica) (Multi)     Rheumatoid arthritis     Sciatica     Stroke (Multi)     Syncope     TIA (transient ischemic attack)     Unspecified abdominal hernia without obstruction or gangrene     Hernia       Social History  Social History     Tobacco Use    Smoking status: Former     Current packs/day: 0.00     Types: Cigarettes     Quit date:      Years since quittin.8    Smokeless tobacco: Never   Vaping Use    Vaping status: Never Used   Substance Use Topics    Alcohol use: Not Currently     Comment: quit 30 years ago    Drug use: Never       Family History     Family History   Problem Relation Name Age of  Onset    Heart attack Mother      Lymphoma Father         Review of Systems  As per HPI, all other systems reviewed and negative.    Allergies:  Allergies   Allergen Reactions    Pollen Extracts Other and Unknown     Sinus congestion    Statins-Hmg-Coa Reductase Inhibitors Myalgia        Outpatient Medications:  Current Outpatient Medications   Medication Instructions    allopurinol (ZYLOPRIM) 100 mg, Daily    cholecalciferol (VITAMIN D-3) 100 mcg, Daily    clopidogrel (PLAVIX) 75 mg, oral, Daily    cyclobenzaprine (FLEXERIL) 10 mg, oral, 3 times daily PRN    ezetimibe (ZETIA) 10 mg, oral, Daily    famotidine (PEPCID) 40 mg, Daily    folic acid (FOLVITE) 0.8 mg, Daily    gabapentin (NEURONTIN) 300 mg, 3 times daily    ipratropium-albuteroL (Duo-Neb) 0.5-2.5 mg/3 mL nebulizer solution Take 3 mL by nebulization 4 times a day as needed for shortness of breath or wheezing.    lisinopril 5 mg, oral, Daily    magnesium oxide (Mag-Ox) 400 mg (241.3 mg magnesium) tablet 1 tablet, Daily    methotrexate (Trexall) 2.5 mg tablet Take by mouth.    metoprolol succinate XL (TOPROL-XL) 25 mg, oral, Daily    montelukast (SINGULAIR) 10 mg, Nightly    omeprazole (PriLOSEC) 40 mg DR capsule 1 capsule, Daily    pantoprazole (PROTONIX) 40 mg    tamsulosin (FLOMAX) 0.4 mg, oral, Daily         Vitals:  There were no vitals filed for this visit.    Physical Exam:  Physical Exam  Vitals and nursing note reviewed.   Constitutional:       Appearance: Normal appearance.   HENT:      Head: Normocephalic and atraumatic.   Eyes:      Extraocular Movements: Extraocular movements intact.      Pupils: Pupils are equal, round, and reactive to light.   Cardiovascular:      Rate and Rhythm: Normal rate and regular rhythm.      Pulses: Normal pulses.   Pulmonary:      Effort: Pulmonary effort is normal.      Breath sounds: Normal breath sounds.   Musculoskeletal:         General: Normal range of motion.      Cervical back: Normal range of motion.       Right lower leg: No edema.      Left lower leg: No edema.   Skin:     General: Skin is warm and dry.   Neurological:      General: No focal deficit present.      Mental Status: He is alert and oriented to person, place, and time.             Assessment/Plan   Diagnoses and all orders for this visit:  Atherosclerosis of native coronary artery of native heart without angina pectoris  Non-Q wave myocardial infarction (Multi)  Primary hypertension  Mixed hyperlipidemia  BMI 28.0-28.9,adult  Former smoker  Acute CVA (cerebrovascular accident) (Multi)          Cristiano Laboy MD [facility]  Interventional Cardiology   of [facility]     Thank you for allowing me to participate in the care of this patient. Please do not hesitate to contact me with any further questions or concerns.

## 2024-11-11 ENCOUNTER — LAB (OUTPATIENT)
Dept: LAB | Facility: LAB | Age: 79
End: 2024-11-11
Payer: MEDICARE

## 2024-11-11 DIAGNOSIS — M54.50 LOW BACK PAIN, UNSPECIFIED: ICD-10-CM

## 2024-11-11 DIAGNOSIS — M15.9 POLYOSTEOARTHRITIS, UNSPECIFIED: ICD-10-CM

## 2024-11-11 DIAGNOSIS — R97.20 HIGH PROSTATE SPECIFIC ANTIGEN (PSA): ICD-10-CM

## 2024-11-11 DIAGNOSIS — M47.817 SPONDYLOSIS WITHOUT MYELOPATHY OR RADICULOPATHY, LUMBOSACRAL REGION: ICD-10-CM

## 2024-11-11 DIAGNOSIS — M10.9 GOUT, UNSPECIFIED: Primary | ICD-10-CM

## 2024-11-11 LAB
PSA SERPL-MCNC: 18.7 NG/ML
URATE SERPL-MCNC: 5.9 MG/DL (ref 4–7.5)

## 2024-11-11 PROCEDURE — 36415 COLL VENOUS BLD VENIPUNCTURE: CPT

## 2024-12-10 ENCOUNTER — APPOINTMENT (OUTPATIENT)
Dept: UROLOGY | Facility: CLINIC | Age: 79
End: 2024-12-10
Payer: MEDICARE

## 2024-12-14 ENCOUNTER — APPOINTMENT (OUTPATIENT)
Dept: UROLOGY | Facility: CLINIC | Age: 79
End: 2024-12-14
Payer: MEDICARE

## 2024-12-14 VITALS
BODY MASS INDEX: 28.36 KG/M2 | DIASTOLIC BLOOD PRESSURE: 77 MMHG | SYSTOLIC BLOOD PRESSURE: 120 MMHG | HEIGHT: 73 IN | RESPIRATION RATE: 16 BRPM | WEIGHT: 214 LBS | HEART RATE: 83 BPM

## 2024-12-14 DIAGNOSIS — R39.15 URINARY URGENCY: ICD-10-CM

## 2024-12-14 DIAGNOSIS — N40.0 BENIGN PROSTATIC HYPERPLASIA, UNSPECIFIED WHETHER LOWER URINARY TRACT SYMPTOMS PRESENT: ICD-10-CM

## 2024-12-14 DIAGNOSIS — R97.20 HIGH PROSTATE SPECIFIC ANTIGEN (PSA): Primary | ICD-10-CM

## 2024-12-14 DIAGNOSIS — R35.0 URINARY FREQUENCY: ICD-10-CM

## 2024-12-14 DIAGNOSIS — R82.89 ABNORMAL URINE CYTOLOGY: ICD-10-CM

## 2024-12-14 RX ORDER — TAMSULOSIN HYDROCHLORIDE 0.4 MG/1
0.4 CAPSULE ORAL DAILY
Qty: 90 CAPSULE | Refills: 3 | Status: SHIPPED | OUTPATIENT
Start: 2024-12-14

## 2024-12-14 ASSESSMENT — ENCOUNTER SYMPTOMS
DIFFICULTY URINATING: 1
DYSURIA: 0
FREQUENCY: 1
HEMATURIA: 0

## 2024-12-14 ASSESSMENT — PAIN SCALES - GENERAL: PAINLEVEL_OUTOF10: 0-NO PAIN

## 2024-12-14 NOTE — PATIENT INSTRUCTIONS
Patient Discussion/Summary     It was good to see you once again.  You have been taking Flomax alone and your residuals today are only 0 cc.  Your PSA, is stable at 18.70, which is significantly lower than your PSA in 2019 of 35.5 when you had a prostate biopsy which was negative.  We will see you again in 6 months      This note was created with voice-recognition software and was not corrected for typographical or grammatical errors

## 2024-12-14 NOTE — PROGRESS NOTES
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 No family history of prostate or breast cancer. 20-pack-year cigarette smoking history.     June 2000, PSA 3.7, TRUS biopsy (6) BPH     May of 2004, PSA 6.6, biopsy (8), BPH     August 2007, PSA 4.4, biopsy (10), BPH     01/12/12, PSA 9.1, biopsy (12) revealed ASAP     May 9 2012, biopsy (14) revealed BPH.     06/24/14 patient presented with significant ORCHALGIA.     12/05/14, PSA was 8.0, last PSA in his last biopsy was done at 9.1. Urine cytologies again ATYPICAL. Scrotal ultrasound is negative.     06/08/15, PSA 11.2. Patient states that he has had some recent discomfort which may be related to prostatitis or even epididymitis. 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 We will have a trial of Cipro for 2 weeks and repeat PSA.     10/19/15, patient had an outside PSA which was reportedly ELEVATED. We have repeated it and it is 7.7 in our laboratory. This is clearly within range of his 2012 biopsy which revealed BPH, when the PSA was 9.1. Of note, the patient is stating that he has URINARY FREQUENCY every hour, NOCTURIA Ã--3, and URGENCY. He states that these are new symptoms over the last 4-6 months. I have offered him a cystoscopy with urodynamic studies. He would like to review our brochures and discuss at our next appointment.     12/29/15, the patient states that his frequency and urgency have abated. He only has NOCTURIA Ã--1 at this point. He refuses evaluation with cystoscopy and urodynamics. PSA is 5.5 which is well within range of his last biopsy. Cytology is negative. Testicular ultrasound shows a right epididymal head complex cyst which is most probably benign. He will return in June 2016 for his annual visit.     06/24/16 patient without any complaints. PSA 7.0, within limits of his 01/12/12 biopsy when the PSA was 9.1. Cytology negative. Testicular ultrasound once  again shows a septated right EPIDIDYMAL HEAD CYST without change. He will return in 6 months with a PSA.     12/21/16, PSA 7.4 which is within range of his 01/12/12 biopsy when the PSA was 9.1. Once again, he is complaining of URGENCY AND FREQUENCY but is not interested in a cystoscopy with urodynamics he is also complaining of foul-smelling urine. We will obtain a urinalysis and urine culture and I will prescribe a 5 day course of Macrobid. When he returns in June, we will order a flow rate and PVR in addition to his PSA, cytology and testicular ultrasound.     06/06/17, PSA is 11.0 which is higher than the PSA of 9.1 when he had a biopsy on 01/12/12 which was benign. We will try 2 weeks course of antibiotics and retake the PSA. If it is above 10.1, we will recommend a TRUS biopsy (14). Patient no longer is complaining of urgency or frequency. Testicular ultrasound shows stable right COMPLEX EPIDIDYMAL CYST. Urine cytology is negative.     12/15/17, PSA is 25.6, which is higher than the PSA of 9.1 when he had his last biopsy on 01/12/12, which proved to be benign. 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 We will try Cipro for 2 weeks and repeat the PSA and make a decision at that point. Clearly if it is the same or higher we will proceed with another biopsy, TRUS biopsy (14). However if it decreases we will either continue the Cipro is a lower dose and repeat PSA or wait until June of next year.     01/07/18, following 2 weeks of Cipro, PSA decreased to 17.2     02/02/18 following 2 more weeks of Cipro, PSA down to 11.4.     06/06/18, patient is complaining of generalized FATIGUE AND WEAKNESS. His blood pressure is now at 100 for systolic which is quite low for him. He will see Dr. Laboy, his cardiologist tomorrow to reevaluate. In addition, his PSA once again is elevated to 26.2. 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 We will give him a trial of Cipro at the 500 mg dose for 2 weeks and repeat Cipro and repeat the PSA. If it is still elevated, I would recommend an MRI guided prostate biopsy with Dr. Arriaza. Testicular ultrasound shows no change and his urine cytology was negative.     07/25/18, patient saw Dr. Arriaza, MRI was ordered: Negative for abnormalities. Therefore patient will return to my care.     12/05/18, PSA is 22.5. His within range of his PSAs over the past year. His MRI was negative for months ago. We will have another PSA in 6 months and I will obtain an [employer] urine test a month prior to his visit. He is now complaining of some urgency, frequency and nocturia. We will discuss a cystoscopy with urodynamic studies at his next visit.     06/07/19, PSA is now 35.50. [facility] urine test is 28.86 which is a high risk for high grade prostate cancer. Cytology is negative. Culture is negative. He will be scheduled for a TRUS biopsy, he wishes to have this performed under anesthesia. This is been scheduled for 06/13/19 and Dr. Laboy will provide preoperative cardiac risk stratification.     06/13/19, OR, TRUS biopsy. PSA 35.50 volume 153.0 cc. Pathology: BPH.     06/19/19, telephone conversation, preoperative chest x-ray showed a nodule     07/08/19, telephone conversation, CAT scan of the chest of the right lung nodules are benign.     12/14/19, patient arrives alone. He states that he is now experiencing URINARY FREQUENCY, URGENCY, AND INCOMPLETE EMPTYING. He does have a 153.0 g prostate on ultrasound. We will pursue a cystoscopy with urodynamic studies. He may benefit from an alpha agent, with consideration to hypotension, a 5 alpha reductase inhibitor or both. His most recent PSA is 24.50 which is significantly less then his PSA of 35.50 on 06/13/19 where he underwent a negative biopsy.     10/26/20, cystoscopy today reveals a severely obstructed prostatic urethra and a severely elevated  median lobe. Multiple trabeculations and small diverticulum within the bladder. Flow rate of 3 cc/s with a total volume of 23 cc. PVR 15 cc's. Patient states that his urinary frequency in the daytime is the major issue urinating approximately every hour. His nocturia is vacillating between times one to Ã--5. I will initiate Flomax 0.4 mg daily. He will return in 3 months and we will consider adding Proscar if necessary.     02/03/21, patient arrives alone. He states that since starting Flomax, his urinary frequency has changed from every hour during the daytime to every 2-1/2 hours. Nocturia is now down from Ã--5 to Ã--1. He did not have much urine today, only 28 cc. Therefore his flow rate was 3 cc with a PVR of 80 cc. PSA is 13.5 which is dramatically lower then his PSA of 35.50 on 06/13/19 when he underwent a negative prostate biopsy. Urine cytologies lacking atypia. Testicular ultrasound shows stable bilateral epididymal cysts. We will no longer proceed with ultrasounds. He does state that he notices an odor to his urine and we will now start checking his UA and culture. If he starts having more urinary symptoms we can add a 5 alpha reductase inhibitor in the future.     June 4, 2021, patient arrives alone. He states that Flomax is working with decreased urgency and frequency and nocturia has decreased to x1. He states that this is associated with fluid intake and activity. Flow rate of 8 cc/s, total volume 41 cc and PVR 37 cc. PSA is 15.90 which is lower than his PSA of 35.50 on June 13, 2019 when he underwent a negative prostate biopsy. Urinalysis and urine culture were negative. Urine cytology was not performed due to laboratory indiscretion. He will return in 1 year.     August 22, 2021, OR, Dr. Brett Norton, lumbar surgery     December 21, 2021, patient calls complaining of hesitancy and incomplete emptying. Believes he has a UTI. Macrobid ordered. UA and CNS were negative.     June 8, 2022, patient  arrives alone. He states that his urinary urgency and frequency has returned. He states his nocturia x3 has returned. He continues on daily Flomax and I will now add Proscar to his regimen. He will return in 6 months. If he still has the same symptoms, we will have him see Dr. Jon George in consultation for minimally invasive prostate surgery. Today's flow rate 7 cc/s, total volume 71 cc, no PVR due to mechanical failure. PSA is 17.49 which is lower than his PSA of 35.50 on June 13, 2019 when he underwent a negative prostate biopsy. Urinalysis shows 1 red blood cell. Urine culture and urine cytology were ordered but not performed.     June 27, 2022, telephone call, patient complains of bladder pain and pressure. We obtained a urinalysis and urine culture which was negative.     August 3, 2022, telephone call, patient states symptoms have improved and he no longer has any issues. He continues to take Flomax and Proscar.     January 17, 2023, patient arrives alone. He has lost 30 pounds as a side effect of one of his medications from pain management. He is discontinued that medicine and has stabilized. Dr. Laboy his cardiologist is manipulating his antihypertensive medication and he does occasionally have dizziness. He has been taking Flomax for over 3 years without side effect. It is most probably secondary to his weight loss and other medications. Today, now that he is on both Flomax and Proscar, his parameters are excellent. Flow rate of 10 cc/s, total volume 105 cc, PVR 17 cc. Urinalysis is negative for blood. Renal colic CAT scan does not identify any stones in the urinary tract. He will return in 6 months     July 28, 2023, patient arrives alone. He continues on Flomax and Proscar with excellent results. Flow rate of 12 cc/s with a PVR of 7 cc. Urinalysis is negative for blood, urine culture no growth, urine cytology is lacking atypia. Corrected PSA is 13.28 which is significantly lower than his PSA of 35.50  on June 13, 2019 when he underwent a negative prostate biopsy. He will return in December.     August 8, 2023, patient admitted to [facility] for transient aphasia     December 2023, patient discontinued Proscar secondary to gynecomastia.     PLAVIX     February 2, 2024, patient arrives alone.  He continues on Flomax alone with a flow rate 10 cc/s, total volume 212 cc,  cc.  He is now developed CKD stage III.  Corrected PSA is 11.10 which is significantly lower than the PSA 35.50 on June 13, 2019 when he underwent a negative prostate biopsy.  He will return in June.     February 21, 2024, OR, Dr. Brett Norton, lumbar surgery     June 18, 2024, patient arrives alone.  He continues on Flomax alone.  PVR today is 0 cc.  On June 13, 2019, with a PSA of 35.50, he underwent a TRUS biopsy showing a volume of 153 cc.  Pathology was negative at that time.  PSA now is 13.40.  He has no new urologic complaints.  Urinalysis no blood.  Urine culture no growth.  Urine cytology was lacking atypia.  He will return in December.    December 14, 2024, patient arrives alone.  He continues on Flomax alone.  PVR today is 0 cc.  On June 13, 2019, with a PSA of 35.50, he underwent a TRUS biopsy showing a volume of 153 cc.  Pathology was negative at that time.  PSA now is 18.70.  He has no new urologic complaints.   He will return in June.     PLAN:     #1 patient will return in December 2025 with a PVR and PSA     Also in June 2025 with a PSA, urine studies and PVR.      #2 continue Flomax 0.4 mg by mouth daily      Physical Exam  Vitals and nursing note reviewed.   Constitutional:       Appearance: Normal appearance.   HENT:      Head: Normocephalic and atraumatic.   Pulmonary:      Effort: Pulmonary effort is normal.   Abdominal:      Palpations: Abdomen is soft.      Tenderness: There is no abdominal tenderness.   Musculoskeletal:         General: Normal range of motion.      Cervical back: Normal range of motion and neck supple.    Neurological:      General: No focal deficit present.      Mental Status: He is alert and oriented to person, place, and time.   Psychiatric:         Mood and Affect: Mood normal.         Behavior: Behavior normal.        This note was created with voice-recognition software and was not corrected for typographical or grammatical errors.

## 2024-12-14 NOTE — PROGRESS NOTES
Patient denies any pain today. Patient has not had any recent surgeries or hospital admits. Patient denies any concerns about falling or safety. Patient has occasional nocturia, frequency, urgency. Patient taking Flomax as directed. CV    Review of Systems   Genitourinary:  Positive for decreased urine volume, difficulty urinating, enuresis, frequency and urgency. Negative for dysuria and hematuria.   All other systems reviewed and are negative.

## 2024-12-14 NOTE — LETTER
December 14, 2024     Jah Wilks MD  [address] [facility] [address]    Patient: Russ Lagos   YOB: 1945   Date of Visit: 12/14/2024       Dear Dr. Jah Wilks MD:    Thank you for referring Russ Lagos to me for evaluation. Below are my notes for this consultation.  If you have questions, please do not hesitate to call me. I look forward to following your patient along with you.       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 No family history of prostate or breast cancer. 20-pack-year cigarette smoking history.     June 2000, PSA 3.7, TRUS biopsy (6) BPH     May of 2004, PSA 6.6, biopsy (8), BPH     August 2007, PSA 4.4, biopsy (10), BPH     01/12/12, PSA 9.1, biopsy (12) revealed ASAP     May 9 2012, biopsy (14) revealed BPH.     06/24/14 patient presented with significant ORCHALGIA.     12/05/14, PSA was 8.0, last PSA in his last biopsy was done at 9.1. Urine cytologies again ATYPICAL. Scrotal ultrasound is negative.     06/08/15, PSA 11.2. Patient states that he has had some recent discomfort which may be related to prostatitis or even epididymitis. 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 We will have a trial of Cipro for 2 weeks and repeat PSA.     10/19/15, patient had an outside PSA which was reportedly ELEVATED. We have repeated it and it is 7.7 in our laboratory. This is clearly within range of his 2012 biopsy which revealed BPH, when the PSA was 9.1. Of note, the patient is stating that he has URINARY FREQUENCY every hour, NOCTURIA Ã--3, and URGENCY. He states that these are new symptoms over the last 4-6 months. I have offered him a cystoscopy with urodynamic studies. He  would like to review our brochures and discuss at our next appointment.     12/29/15, the patient states that his frequency and urgency have abated. He only has NOCTURIA Ã--1 at this point. He refuses evaluation with cystoscopy and urodynamics. PSA is 5.5 which is well within range of his last biopsy. Cytology is negative. Testicular ultrasound shows a right epididymal head complex cyst which is most probably benign. He will return in June 2016 for his annual visit.     06/24/16 patient without any complaints. PSA 7.0, within limits of his 01/12/12 biopsy when the PSA was 9.1. Cytology negative. Testicular ultrasound once again shows a septated right EPIDIDYMAL HEAD CYST without change. He will return in 6 months with a PSA.     12/21/16, PSA 7.4 which is within range of his 01/12/12 biopsy when the PSA was 9.1. Once again, he is complaining of URGENCY AND FREQUENCY but is not interested in a cystoscopy with urodynamics he is also complaining of foul-smelling urine. We will obtain a urinalysis and urine culture and I will prescribe a 5 day course of Macrobid. When he returns in June, we will order a flow rate and PVR in addition to his PSA, cytology and testicular ultrasound.     06/06/17, PSA is 11.0 which is higher than the PSA of 9.1 when he had a biopsy on 01/12/12 which was benign. We will try 2 weeks course of antibiotics and retake the PSA. If it is above 10.1, we will recommend a TRUS biopsy (14). Patient no longer is complaining of urgency or frequency. Testicular ultrasound shows stable right COMPLEX EPIDIDYMAL CYST. Urine cytology is negative.     12/15/17, PSA is 25.6, which is higher than the PSA of 9.1 when he had his last biopsy on 01/12/12, which proved to be benign. 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 We will try Cipro for 2 weeks and repeat the PSA and make a decision at that point. Clearly if it is the same or  higher we will proceed with another biopsy, TRUS biopsy (14). However if it decreases we will either continue the Cipro is a lower dose and repeat PSA or wait until June of next year.     01/07/18, following 2 weeks of Cipro, PSA decreased to 17.2     02/02/18 following 2 more weeks of Cipro, PSA down to 11.4.     06/06/18, patient is complaining of generalized FATIGUE AND WEAKNESS. His blood pressure is now at 100 for systolic which is quite low for him. He will see Dr. Laboy, his cardiologist tomorrow to reevaluate. In addition, his PSA once again is elevated to 26.2. 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 We will give him a trial of Cipro at the 500 mg dose for 2 weeks and repeat Cipro and repeat the PSA. If it is still elevated, I would recommend an MRI guided prostate biopsy with Dr. Arriaza. Testicular ultrasound shows no change and his urine cytology was negative.     07/25/18, patient saw Dr. Arriaza, MRI was ordered: Negative for abnormalities. Therefore patient will return to my care.     12/05/18, PSA is 22.5. His within range of his PSAs over the past year. His MRI was negative for months ago. We will have another PSA in 6 months and I will obtain an [employer] urine test a month prior to his visit. He is now complaining of some urgency, frequency and nocturia. We will discuss a cystoscopy with urodynamic studies at his next visit.     06/07/19, PSA is now 35.50. [employer] urine test is 28.86 which is a high risk for high grade prostate cancer. Cytology is negative. Culture is negative. He will be scheduled for a TRUS biopsy, he wishes to have this performed under anesthesia. This is been scheduled for 06/13/19 and Dr. Laboy will provide preoperative cardiac risk stratification.     06/13/19, OR, TRUS biopsy. PSA 35.50 volume 153.0 cc. Pathology: BPH.     06/19/19, telephone conversation, preoperative chest x-ray showed a nodule     07/08/19, telephone  conversation, CAT scan of the chest of the right lung nodules are benign.     12/14/19, patient arrives alone. He states that he is now experiencing URINARY FREQUENCY, URGENCY, AND INCOMPLETE EMPTYING. He does have a 153.0 g prostate on ultrasound. We will pursue a cystoscopy with urodynamic studies. He may benefit from an alpha agent, with consideration to hypotension, a 5 alpha reductase inhibitor or both. His most recent PSA is 24.50 which is significantly less then his PSA of 35.50 on 06/13/19 where he underwent a negative biopsy.     10/26/20, cystoscopy today reveals a severely obstructed prostatic urethra and a severely elevated median lobe. Multiple trabeculations and small diverticulum within the bladder. Flow rate of 3 cc/s with a total volume of 23 cc. PVR 15 cc's. Patient states that his urinary frequency in the daytime is the major issue urinating approximately every hour. His nocturia is vacillating between times one to Ã--5. I will initiate Flomax 0.4 mg daily. He will return in 3 months and we will consider adding Proscar if necessary.     02/03/21, patient arrives alone. He states that since starting Flomax, his urinary frequency has changed from every hour during the daytime to every 2-1/2 hours. Nocturia is now down from Ã--5 to Ã--1. He did not have much urine today, only 28 cc. Therefore his flow rate was 3 cc with a PVR of 80 cc. PSA is 13.5 which is dramatically lower then his PSA of 35.50 on 06/13/19 when he underwent a negative prostate biopsy. Urine cytologies lacking atypia. Testicular ultrasound shows stable bilateral epididymal cysts. We will no longer proceed with ultrasounds. He does state that he notices an odor to his urine and we will now start checking his UA and culture. If he starts having more urinary symptoms we can add a 5 alpha reductase inhibitor in the future.     June 4, 2021, patient arrives alone. He states that Flomax is working with decreased urgency and frequency and  nocturia has decreased to x1. He states that this is associated with fluid intake and activity. Flow rate of 8 cc/s, total volume 41 cc and PVR 37 cc. PSA is 15.90 which is lower than his PSA of 35.50 on June 13, 2019 when he underwent a negative prostate biopsy. Urinalysis and urine culture were negative. Urine cytology was not performed due to laboratory indiscretion. He will return in 1 year.     August 22, 2021, OR, Dr. Brett Norton, lumbar surgery     December 21, 2021, patient calls complaining of hesitancy and incomplete emptying. Believes he has a UTI. Macrobid ordered. UA and CNS were negative.     June 8, 2022, patient arrives alone. He states that his urinary urgency and frequency has returned. He states his nocturia x3 has returned. He continues on daily Flomax and I will now add Proscar to his regimen. He will return in 6 months. If he still has the same symptoms, we will have him see Dr. Jon George in consultation for minimally invasive prostate surgery. Today's flow rate 7 cc/s, total volume 71 cc, no PVR due to mechanical failure. PSA is 17.49 which is lower than his PSA of 35.50 on June 13, 2019 when he underwent a negative prostate biopsy. Urinalysis shows 1 red blood cell. Urine culture and urine cytology were ordered but not performed.     June 27, 2022, telephone call, patient complains of bladder pain and pressure. We obtained a urinalysis and urine culture which was negative.     August 3, 2022, telephone call, patient states symptoms have improved and he no longer has any issues. He continues to take Flomax and Proscar.     January 17, 2023, patient arrives alone. He has lost 30 pounds as a side effect of one of his medications from pain management. He is discontinued that medicine and has stabilized. Dr. Laboy his cardiologist is manipulating his antihypertensive medication and he does occasionally have dizziness. He has been taking Flomax for over 3 years without side effect. It is  most probably secondary to his weight loss and other medications. Today, now that he is on both Flomax and Proscar, his parameters are excellent. Flow rate of 10 cc/s, total volume 105 cc, PVR 17 cc. Urinalysis is negative for blood. Renal colic CAT scan does not identify any stones in the urinary tract. He will return in 6 months     July 28, 2023, patient arrives alone. He continues on Flomax and Proscar with excellent results. Flow rate of 12 cc/s with a PVR of 7 cc. Urinalysis is negative for blood, urine culture no growth, urine cytology is lacking atypia. Corrected PSA is 13.28 which is significantly lower than his PSA of 35.50 on June 13, 2019 when he underwent a negative prostate biopsy. He will return in December.     August 8, 2023, patient admitted to [facility] for transient aphasia     December 2023, patient discontinued Proscar secondary to gynecomastia.     PLAVIX     February 2, 2024, patient arrives alone.  He continues on Flomax alone with a flow rate 10 cc/s, total volume 212 cc,  cc.  He is now developed CKD stage III.  Corrected PSA is 11.10 which is significantly lower than the PSA 35.50 on June 13, 2019 when he underwent a negative prostate biopsy.  He will return in June.     February 21, 2024, OR, Dr. Brett Norton, lumbar surgery     June 18, 2024, patient arrives alone.  He continues on Flomax alone.  PVR today is 0 cc.  On June 13, 2019, with a PSA of 35.50, he underwent a TRUS biopsy showing a volume of 153 cc.  Pathology was negative at that time.  PSA now is 13.40.  He has no new urologic complaints.  Urinalysis no blood.  Urine culture no growth.  Urine cytology was lacking atypia.  He will return in December.    December 14, 2024, patient arrives alone.  He continues on Flomax alone.  PVR today is 0 cc.  On June 13, 2019, with a PSA of 35.50, he underwent a TRUS biopsy showing a volume of 153 cc.  Pathology was negative at that time.  PSA now is 18.70.  He has no new urologic  complaints.   He will return in June.     PLAN:     #1 patient will return in December 2025 with a PVR and PSA     Also in June 2025 with a PSA, urine studies and PVR.      #2 continue Flomax 0.4 mg by mouth daily      Physical Exam  Vitals and nursing note reviewed.   Constitutional:       Appearance: Normal appearance.   HENT:      Head: Normocephalic and atraumatic.   Pulmonary:      Effort: Pulmonary effort is normal.   Abdominal:      Palpations: Abdomen is soft.      Tenderness: There is no abdominal tenderness.   Musculoskeletal:         General: Normal range of motion.      Cervical back: Normal range of motion and neck supple.   Neurological:      General: No focal deficit present.      Mental Status: He is alert and oriented to person, place, and time.   Psychiatric:         Mood and Affect: Mood normal.         Behavior: Behavior normal.        This note was created with voice-recognition software and was not corrected for typographical or grammatical errors.

## 2024-12-16 ENCOUNTER — OFFICE VISIT (OUTPATIENT)
Age: 79
End: 2024-12-16
Payer: MEDICARE

## 2024-12-16 VITALS
RESPIRATION RATE: 17 BRPM | SYSTOLIC BLOOD PRESSURE: 132 MMHG | HEIGHT: 74 IN | OXYGEN SATURATION: 95 % | BODY MASS INDEX: 27.86 KG/M2 | DIASTOLIC BLOOD PRESSURE: 78 MMHG | HEART RATE: 70 BPM

## 2024-12-16 DIAGNOSIS — J44.0 CHRONIC OBSTRUCTIVE PULMONARY DISEASE WITH ACUTE LOWER RESPIRATORY INFECTION (HCC): Primary | ICD-10-CM

## 2024-12-16 DIAGNOSIS — B96.89 ACUTE BACTERIAL SINUSITIS: ICD-10-CM

## 2024-12-16 DIAGNOSIS — J01.90 ACUTE BACTERIAL SINUSITIS: ICD-10-CM

## 2024-12-16 PROCEDURE — 90653 IIV ADJUVANT VACCINE IM: CPT | Performed by: FAMILY MEDICINE

## 2024-12-16 RX ORDER — CEFDINIR 300 MG/1
300 CAPSULE ORAL 2 TIMES DAILY
Qty: 20 CAPSULE | Refills: 0 | Status: SHIPPED | OUTPATIENT
Start: 2024-12-16 | End: 2024-12-26

## 2024-12-16 SDOH — ECONOMIC STABILITY: FOOD INSECURITY: WITHIN THE PAST 12 MONTHS, THE FOOD YOU BOUGHT JUST DIDN'T LAST AND YOU DIDN'T HAVE MONEY TO GET MORE.: NEVER TRUE

## 2024-12-16 SDOH — ECONOMIC STABILITY: FOOD INSECURITY: WITHIN THE PAST 12 MONTHS, YOU WORRIED THAT YOUR FOOD WOULD RUN OUT BEFORE YOU GOT MONEY TO BUY MORE.: NEVER TRUE

## 2024-12-16 SDOH — ECONOMIC STABILITY: INCOME INSECURITY: HOW HARD IS IT FOR YOU TO PAY FOR THE VERY BASICS LIKE FOOD, HOUSING, MEDICAL CARE, AND HEATING?: NOT HARD AT ALL

## 2024-12-16 ASSESSMENT — ENCOUNTER SYMPTOMS
GASTROINTESTINAL NEGATIVE: 1
COUGH: 1
ALLERGIC/IMMUNOLOGIC NEGATIVE: 1
EYES NEGATIVE: 1
SHORTNESS OF BREATH: 0

## 2024-12-16 NOTE — PROGRESS NOTES
allopurinol (ZYLOPRIM) 100 MG tablet Take 1 tablet by mouth daily      hydroCHLOROthiazide (MICROZIDE) 12.5 MG capsule Take 1 capsule by mouth daily      finasteride (PROSCAR) 5 MG tablet Take 1 tablet by mouth daily      gabapentin (NEURONTIN) 600 MG tablet Take 1 tablet by mouth 3 times daily.      clopidogrel (PLAVIX) 75 MG tablet Take 1 tablet by mouth daily 30 tablet 2    tamsulosin (FLOMAX) 0.4 MG capsule Take by mouth      montelukast (SINGULAIR) 10 MG tablet Take 1 tablet by mouth nightly      metoprolol succinate (TOPROL XL) 25 MG extended release tablet take 1 tablet daily  3    folic acid (FOLVITE) 1 MG tablet Take 1 tablet by mouth daily      Nebulizer MISC by Does not apply route      vitamin D-3 (CHOLECALCIFEROL) 5000 UNITS TABS Take 1 tablet by mouth daily      magnesium oxide (MAG-OX) 400 MG tablet Take 1 tablet by mouth daily       Current Facility-Administered Medications on File Prior to Visit   Medication Dose Route Frequency Provider Last Rate Last Admin    albuterol (PROVENTIL) nebulizer solution 2.5 mg  2.5 mg Nebulization Once Dennys Andrew, DO         Allergies   Allergen Reactions    Seasonal      Sinus congestion    Statins      MYALGIAS     Health Maintenance   Topic Date Due    DTaP/Tdap/Td vaccine (1 - Tdap) 05/18/2000    Hepatitis B vaccine (3 of 3 - 19+ 3-dose series) 07/12/2000    Respiratory Syncytial Virus (RSV) Pregnant or age 60 yrs+ (1 - 1-dose 75+ series) Never done    Prostate Specific Antigen (PSA) Screening or Monitoring  06/08/2024    COVID-19 Vaccine (3 - 2023-24 season) 09/01/2024    Shingles vaccine (1 of 2) 10/01/2025 (Originally 6/27/1993)    Depression Screen  01/30/2025    Annual Wellness Visit (Medicare)  01/30/2025    Flu vaccine  Completed    Pneumococcal 65+ years Vaccine  Completed    Hepatitis A vaccine  Aged Out    Hib vaccine  Aged Out    Polio vaccine  Aged Out    Meningococcal (ACWY) vaccine  Aged Out       Review of Systems     Review of Systems

## 2025-03-11 ENCOUNTER — OFFICE VISIT (OUTPATIENT)
Dept: ORTHOPEDIC SURGERY | Facility: CLINIC | Age: 80
End: 2025-03-11
Payer: MEDICARE

## 2025-03-11 ENCOUNTER — HOSPITAL ENCOUNTER (OUTPATIENT)
Dept: RADIOLOGY | Facility: CLINIC | Age: 80
Discharge: HOME | End: 2025-03-11
Payer: MEDICARE

## 2025-03-11 DIAGNOSIS — M54.50 LOW BACK PAIN, UNSPECIFIED BACK PAIN LATERALITY, UNSPECIFIED CHRONICITY, UNSPECIFIED WHETHER SCIATICA PRESENT: ICD-10-CM

## 2025-03-11 DIAGNOSIS — M54.50 LOW BACK PAIN, UNSPECIFIED BACK PAIN LATERALITY, UNSPECIFIED CHRONICITY, UNSPECIFIED WHETHER SCIATICA PRESENT: Primary | ICD-10-CM

## 2025-03-11 PROCEDURE — 99214 OFFICE O/P EST MOD 30 MIN: CPT | Performed by: ORTHOPAEDIC SURGERY

## 2025-03-11 PROCEDURE — 72100 X-RAY EXAM L-S SPINE 2/3 VWS: CPT

## 2025-03-11 PROCEDURE — 72100 X-RAY EXAM L-S SPINE 2/3 VWS: CPT | Performed by: ORTHOPAEDIC SURGERY

## 2025-03-11 PROCEDURE — 99213 OFFICE O/P EST LOW 20 MIN: CPT | Performed by: ORTHOPAEDIC SURGERY

## 2025-03-11 PROCEDURE — 1036F TOBACCO NON-USER: CPT | Performed by: ORTHOPAEDIC SURGERY

## 2025-03-11 PROCEDURE — 1159F MED LIST DOCD IN RCRD: CPT | Performed by: ORTHOPAEDIC SURGERY

## 2025-03-11 NOTE — PROGRESS NOTES
Russ Lagos is a 79 y.o. male who presents for Follow-up of the Lower Back (2/21/24 Revision L4-5 MIS TLIF/1 year out/X-rays today).    HPI:  79-year-old gentleman here for low back surgical follow-up.  1 year out from an L4-5 MIS TLIF after prior L2-S1 midline laminectomy.  He denies any fever chills nausea vomiting night sweats.  He has no bowel or bladder complaints.    Physical exam:  Well-nourished, well kept.No lymphangitis or lymphadenopathy in the examined extremities.  Gait normal.  Can stand on heels and toes.   Examination of the back shows minimal tenderness in the paraspinous musculature.  There is no significant decreased range of motion in all directions due to guarding/muscle spasms and pain at extremes.  There is good strength and no instability.  Examination of the lower extremities reveals no point tenderness, swelling, or deformity.  Range of motion of the hips, knees, and ankles are full without crepitance, instability, or exacerbation of pain.  Strength is 5/5 throughout.  No redness, abrasions, or lesions on extremities  Gross sensation intact in the extremities.   Affect normal.  Alert and oriented ×3.  Coordination normal.    Imaging studies:  We ordered and reviewed AP lateral x-rays of the lumbar spine today.    Assessment:  79-year-old gentleman here for surgical follow-up.  He is 1 year out from an L4-5 MIS TLIF after a prior L2-S1 midline laminectomy.  Overall he feels like his back pain is about 60% better, but if he overdoes it, if he stands too long or if he bangs she is alone he will start to get lumbosacral pain.  His legs in the upper portions are doing okay, he does have neuropathy that he states started in his feet and is now working its way up into the calf and shin bilaterally.  He does have some numbness and tingling bilaterally in the lower portion of his legs.  Overall he has good days and bad days based on his activity.    We have ordered and reviewed tests, x-rays.   We reviewed the notes from his primary from Dr. Jah Wilks from December 16, 2024 this mentions his stable back pain.  This is a patient with 2 chronic stable problems low back pain leg pain.    For complete plan and/or surgical details, please refer to Dr. Norton's portion of this split dictation.    -Marc Segura PA-C    In a face-to-face encounter, I performed a history and physical examination, discussed pertinent diagnostic studies if indicated, and discussed diagnosis and management strategies with both the patient and the midlevel provider.  I reviewed the midlevel's note and agree with the documented findings and plan of care.    1 year follow-up L4-5 MIS TLIF.  Prior to that he had an L2-S1 midline laminectomy.  All in all he says he is a lot better compared to before both of his surgeries.  He does have some back achiness that is flared up a little bit recently so he does have this exacerbation of a chronic problem.  But he says he can live with it.  I offered him physical therapy but he refused and said he want to the exercises anyway so it is not worth it.  He is walking fine.  His legs are getting some neuropathy in his feet but all in all feeling pretty good.  X-rays were ordered and reviewed today and show hardware is in good position without any evidence of failure.  We reviewed Dr. Wilks's notes from December 2024 which shows that he is still complaining of back pain as well.  We will let him engage in activities as tolerated and give us a call if he has any questions or concerns.    Brett Norton MD  Orthopedic surgery

## 2025-04-22 ENCOUNTER — APPOINTMENT (OUTPATIENT)
Dept: CARDIOLOGY | Facility: CLINIC | Age: 80
End: 2025-04-22
Payer: MEDICARE

## 2025-04-23 DIAGNOSIS — I10 PRIMARY HYPERTENSION: ICD-10-CM

## 2025-04-23 NOTE — TELEPHONE ENCOUNTER
Received request for prescription refills for patient.   Patient follows with Dr. Laboy    Request is for Metoprolol succ 25 mg  Is patient currently on medication yes    Last OV 10/22/2024  Next OV none sent to scheduling for additional refills    Pended for signing and sent to provider

## 2025-04-24 RX ORDER — METOPROLOL SUCCINATE 25 MG/1
25 TABLET, EXTENDED RELEASE ORAL DAILY
Qty: 90 TABLET | Refills: 0 | Status: SHIPPED | OUTPATIENT
Start: 2025-04-24

## 2025-04-29 ENCOUNTER — APPOINTMENT (OUTPATIENT)
Dept: CARDIOLOGY | Facility: CLINIC | Age: 80
End: 2025-04-29
Payer: MEDICARE

## 2025-04-29 VITALS
SYSTOLIC BLOOD PRESSURE: 118 MMHG | HEIGHT: 73 IN | BODY MASS INDEX: 28.68 KG/M2 | WEIGHT: 216.4 LBS | DIASTOLIC BLOOD PRESSURE: 60 MMHG | HEART RATE: 72 BPM

## 2025-04-29 DIAGNOSIS — I49.9 CARDIAC ARRHYTHMIA, UNSPECIFIED CARDIAC ARRHYTHMIA TYPE: ICD-10-CM

## 2025-04-29 DIAGNOSIS — I63.9 ACUTE CVA (CEREBROVASCULAR ACCIDENT) (MULTI): ICD-10-CM

## 2025-04-29 DIAGNOSIS — I25.10 ATHEROSCLEROSIS OF NATIVE CORONARY ARTERY OF NATIVE HEART WITHOUT ANGINA PECTORIS: ICD-10-CM

## 2025-04-29 DIAGNOSIS — I10 PRIMARY HYPERTENSION: ICD-10-CM

## 2025-04-29 DIAGNOSIS — E78.2 MIXED HYPERLIPIDEMIA: ICD-10-CM

## 2025-04-29 PROCEDURE — 3078F DIAST BP <80 MM HG: CPT | Performed by: INTERNAL MEDICINE

## 2025-04-29 PROCEDURE — 1159F MED LIST DOCD IN RCRD: CPT | Performed by: INTERNAL MEDICINE

## 2025-04-29 PROCEDURE — 1036F TOBACCO NON-USER: CPT | Performed by: INTERNAL MEDICINE

## 2025-04-29 PROCEDURE — 3074F SYST BP LT 130 MM HG: CPT | Performed by: INTERNAL MEDICINE

## 2025-04-29 PROCEDURE — 99213 OFFICE O/P EST LOW 20 MIN: CPT | Performed by: INTERNAL MEDICINE

## 2025-04-29 RX ORDER — METOPROLOL SUCCINATE 25 MG/1
25 TABLET, EXTENDED RELEASE ORAL DAILY
Qty: 90 TABLET | Refills: 3 | Status: SHIPPED | OUTPATIENT
Start: 2025-04-29

## 2025-04-29 RX ORDER — CLOPIDOGREL BISULFATE 75 MG/1
75 TABLET ORAL DAILY
Qty: 90 TABLET | Refills: 3 | Status: SHIPPED | OUTPATIENT
Start: 2025-04-29 | End: 2026-04-29

## 2025-04-29 RX ORDER — EZETIMIBE 10 MG/1
10 TABLET ORAL DAILY
Qty: 90 TABLET | Refills: 3 | Status: SHIPPED | OUTPATIENT
Start: 2025-04-29 | End: 2026-04-29

## 2025-04-29 NOTE — PROGRESS NOTES
Referred by Dr. Shaikh ref. provider found provider found for   Chief Complaint   Patient presents with    Follow-up     6 month follow-up for management of atherosclerosis of coronary artery, hypertension & hyperlipidemia        Chief complaint:   Chief Complaint   Patient presents with    Follow-up     6 month follow-up for management of atherosclerosis of coronary artery, hypertension & hyperlipidemia        History of Present Illness  Russ Lagos is a 79 y.o. year old male patient is here for follow-up.  Doing well from a cardiac standpoint.  No symptoms of chest pain.  Blood pressure is adequately controlled without elevation.  The patient will continue with significant follow-up and part of our cardiovascular    Past Medical History  Medical History[1]    Social History  Social History[2]    Family History   Family History[3]    Review of Systems  As per HPI, all other systems reviewed and negative.    Allergies:  RX Allergies[4]     Outpatient Medications:  Current Outpatient Medications   Medication Instructions    allopurinol (ZYLOPRIM) 100 mg, Daily    cholecalciferol (VITAMIN D-3) 100 mcg, Daily    clopidogrel (PLAVIX) 75 mg, oral, Daily    cyclobenzaprine (FLEXERIL) 10 mg, oral, 3 times daily PRN    ezetimibe (ZETIA) 10 mg, oral, Daily    folic acid (FOLVITE) 0.8 mg, Daily    gabapentin (NEURONTIN) 300 mg, 3 times daily    ipratropium-albuteroL (Duo-Neb) 0.5-2.5 mg/3 mL nebulizer solution Take 3 mL by nebulization 4 times a day as needed for shortness of breath or wheezing.    magnesium oxide (Mag-Ox) 400 mg (241.3 mg magnesium) tablet 1 tablet, Daily    metoprolol succinate XL (TOPROL-XL) 25 mg, oral, Daily    montelukast (SINGULAIR) 10 mg, Nightly    omeprazole (PriLOSEC) 40 mg DR capsule 1 capsule, Daily    pantoprazole (PROTONIX) 40 mg    tamsulosin (FLOMAX) 0.4 mg, oral, Daily         Vitals:  Vitals:    04/29/25 1253   BP: 118/60   Pulse: 72       Physical Exam:  Physical Exam  Constitutional:        Appearance: Normal appearance.   HENT:      Head: Normocephalic and atraumatic.   Eyes:      Extraocular Movements: Extraocular movements intact.      Pupils: Pupils are equal, round, and reactive to light.   Cardiovascular:      Rate and Rhythm: Normal rate and regular rhythm.      Pulses: Normal pulses.      Heart sounds: Normal heart sounds.   Pulmonary:      Effort: Pulmonary effort is normal.      Breath sounds: Normal breath sounds.   Abdominal:      General: Abdomen is flat.      Palpations: Abdomen is soft.   Musculoskeletal:      Right lower leg: No edema.      Left lower leg: No edema.   Skin:     General: Skin is warm and dry.   Neurological:      General: No focal deficit present.      Mental Status: He is alert and oriented to person, place, and time.             Assessment/Plan   Diagnoses and all orders for this visit:  Primary hypertension  Acute CVA (cerebrovascular accident) (Multi)  Mixed hyperlipidemia  Atherosclerosis of native coronary artery of native heart without angina pectoris  Cardiac arrhythmia, unspecified cardiac arrhythmia type          I,Niki Hernandez MA  am scribing for, and in the presence of Dr. Laboy.    I, Dr. Laboy, personally performed the services described in the documentation as scribed by Niki Hernandez MA  in my presence, and confirm it is both accurate and complete.      Cristiano Laboy MD [facility]  Interventional Cardiology  Thank you for allowing me to participate in the care of this patient. Please do not hesitate to contact me with any further questions or concerns.         [1]   Past Medical History:  Diagnosis Date    Acid reflux     Allergy status to unspecified drugs, medicaments and biological substances     History of seasonal allergies    Arrhythmia     NSVT and PSVT    Chronic pain disorder     CKD (chronic kidney disease)     stage 2-3    COPD (chronic obstructive pulmonary disease) (Multi)     Coronary artery disease     with PCI x3    Family history  of prostate problems     Heart attack     Hypertension     Lumbar disc disease     Old myocardial infarction     History of myocardial infarction    Peripheral neuropathy     Personal history of urinary calculi     History of renal calculi    PMR (polymyalgia rheumatica) (Multi)     Rheumatoid arthritis     Sciatica     Stroke (Multi)     Syncope     TIA (transient ischemic attack)     Unspecified abdominal hernia without obstruction or gangrene     Hernia   [2]   Social History  Tobacco Use    Smoking status: Former     Current packs/day: 0.00     Types: Cigarettes     Quit date:      Years since quittin.3    Smokeless tobacco: Never   Vaping Use    Vaping status: Never Used   Substance Use Topics    Alcohol use: Not Currently     Comment: quit 30 years ago    Drug use: Never   [3]   Family History  Problem Relation Name Age of Onset    Heart attack Mother      Lymphoma Father     [4]   Allergies  Allergen Reactions    Pollen Extracts Other and Unknown     Sinus congestion    Statins-Hmg-Coa Reductase Inhibitors Myalgia

## 2025-05-06 ENCOUNTER — TELEMEDICINE (OUTPATIENT)
Age: 80
End: 2025-05-06
Payer: MEDICARE

## 2025-05-06 DIAGNOSIS — Z00.00 MEDICARE ANNUAL WELLNESS VISIT, SUBSEQUENT: Primary | ICD-10-CM

## 2025-05-06 PROCEDURE — 1159F MED LIST DOCD IN RCRD: CPT | Performed by: NURSE PRACTITIONER

## 2025-05-06 PROCEDURE — 1123F ACP DISCUSS/DSCN MKR DOCD: CPT | Performed by: NURSE PRACTITIONER

## 2025-05-06 PROCEDURE — G0439 PPPS, SUBSEQ VISIT: HCPCS | Performed by: NURSE PRACTITIONER

## 2025-05-06 SDOH — ECONOMIC STABILITY: FOOD INSECURITY: WITHIN THE PAST 12 MONTHS, THE FOOD YOU BOUGHT JUST DIDN'T LAST AND YOU DIDN'T HAVE MONEY TO GET MORE.: NEVER TRUE

## 2025-05-06 SDOH — ECONOMIC STABILITY: FOOD INSECURITY: WITHIN THE PAST 12 MONTHS, YOU WORRIED THAT YOUR FOOD WOULD RUN OUT BEFORE YOU GOT MONEY TO BUY MORE.: NEVER TRUE

## 2025-05-06 ASSESSMENT — PATIENT HEALTH QUESTIONNAIRE - PHQ9
SUM OF ALL RESPONSES TO PHQ QUESTIONS 1-9: 0
1. LITTLE INTEREST OR PLEASURE IN DOING THINGS: NOT AT ALL
SUM OF ALL RESPONSES TO PHQ QUESTIONS 1-9: 0
2. FEELING DOWN, DEPRESSED OR HOPELESS: NOT AT ALL

## 2025-05-06 NOTE — PROGRESS NOTES
Medicare Annual Wellness Visit    Parker Polanco is here for Medicare AWV    Assessment & Plan   Medicare annual wellness visit, subsequent       No follow-ups on file.     Subjective       Patient's complete Health Risk Assessment and screening values have been reviewed and are found in Flowsheets. The following problems were reviewed today and where indicated follow up appointments were made and/or referrals ordered.    Positive Risk Factor Screenings with Interventions:             General HRA Questions:  Select all that apply: (!) New or Increased Pain  Interventions - Pain:  Patient advised to follow up in the office for further evaluation and treatment      Inactivity:  On average, how many days per week do you engage in moderate to strenuous exercise (like a brisk walk)?: 0 days (!) Abnormal  On average, how many minutes do you engage in exercise at this level?: 0 min  Interventions:  Patient declined any further interventions or treatment      Dentist Screen:  Have you seen the dentist within the past year?: (!) No    Intervention:  Advised to schedule with their dentist     Vision Screen:  Do you have difficulty driving, watching TV, or doing any of your daily activities because of your eyesight?: No  Have you had an eye exam within the past year?: (!) No  Interventions:   Patient encouraged to make appointment with their eye specialist                    Objective    Patient-Reported Vitals  No data recorded               Allergies   Allergen Reactions    Seasonal      Sinus congestion    Statins      MYALGIAS     Prior to Visit Medications    Medication Sig Taking? Authorizing Provider   lisinopril (PRINIVIL;ZESTRIL) 5 MG tablet Take 1 tablet by mouth daily Yes Amaya Mehta MD   famotidine (PEPCID) 40 MG tablet Take 1 tablet by mouth daily Yes Amaya Mehta MD   allopurinol (ZYLOPRIM) 100 MG tablet Take 1 tablet by mouth daily Yes Amaya Mehta MD   hydroCHLOROthiazide

## 2025-06-02 ENCOUNTER — OFFICE VISIT (OUTPATIENT)
Age: 80
End: 2025-06-02
Payer: MEDICARE

## 2025-06-02 VITALS
DIASTOLIC BLOOD PRESSURE: 66 MMHG | BODY MASS INDEX: 27.67 KG/M2 | HEART RATE: 71 BPM | SYSTOLIC BLOOD PRESSURE: 130 MMHG | WEIGHT: 215.6 LBS | HEIGHT: 74 IN | TEMPERATURE: 97.7 F | OXYGEN SATURATION: 94 %

## 2025-06-02 DIAGNOSIS — N18.31 STAGE 3A CHRONIC KIDNEY DISEASE (HCC): ICD-10-CM

## 2025-06-02 DIAGNOSIS — M05.9 RHEUMATOID ARTHRITIS WITH POSITIVE RHEUMATOID FACTOR, INVOLVING UNSPECIFIED SITE (HCC): ICD-10-CM

## 2025-06-02 DIAGNOSIS — J44.0 CHRONIC OBSTRUCTIVE PULMONARY DISEASE WITH ACUTE LOWER RESPIRATORY INFECTION (HCC): ICD-10-CM

## 2025-06-02 DIAGNOSIS — Z12.5 PROSTATE CANCER SCREENING: ICD-10-CM

## 2025-06-02 DIAGNOSIS — I63.9 CEREBRAL INFARCTION, UNSPECIFIED MECHANISM (HCC): Primary | ICD-10-CM

## 2025-06-02 PROCEDURE — 99213 OFFICE O/P EST LOW 20 MIN: CPT | Performed by: FAMILY MEDICINE

## 2025-06-02 PROCEDURE — 99214 OFFICE O/P EST MOD 30 MIN: CPT | Performed by: FAMILY MEDICINE

## 2025-06-02 PROCEDURE — 3075F SYST BP GE 130 - 139MM HG: CPT | Performed by: FAMILY MEDICINE

## 2025-06-02 PROCEDURE — G8427 DOCREV CUR MEDS BY ELIG CLIN: HCPCS | Performed by: FAMILY MEDICINE

## 2025-06-02 PROCEDURE — 1159F MED LIST DOCD IN RCRD: CPT | Performed by: FAMILY MEDICINE

## 2025-06-02 PROCEDURE — G8417 CALC BMI ABV UP PARAM F/U: HCPCS | Performed by: FAMILY MEDICINE

## 2025-06-02 PROCEDURE — 3023F SPIROM DOC REV: CPT | Performed by: FAMILY MEDICINE

## 2025-06-02 PROCEDURE — 88112 CYTOPATH CELL ENHANCE TECH: CPT

## 2025-06-02 PROCEDURE — 1123F ACP DISCUSS/DSCN MKR DOCD: CPT | Performed by: FAMILY MEDICINE

## 2025-06-02 PROCEDURE — 3078F DIAST BP <80 MM HG: CPT | Performed by: FAMILY MEDICINE

## 2025-06-02 PROCEDURE — 1036F TOBACCO NON-USER: CPT | Performed by: FAMILY MEDICINE

## 2025-06-02 RX ORDER — FLUTICASONE PROPIONATE 50 MCG
1 SPRAY, SUSPENSION (ML) NASAL 2 TIMES DAILY
COMMUNITY
Start: 2024-09-28

## 2025-06-02 RX ORDER — IPRATROPIUM BROMIDE AND ALBUTEROL SULFATE 2.5; .5 MG/3ML; MG/3ML
SOLUTION RESPIRATORY (INHALATION)
COMMUNITY
Start: 2025-04-11

## 2025-06-02 RX ORDER — EZETIMIBE 10 MG/1
10 TABLET ORAL DAILY
COMMUNITY
Start: 2025-04-29 | End: 2026-04-29

## 2025-06-02 RX ORDER — CETIRIZINE HYDROCHLORIDE 10 MG/1
10 TABLET ORAL DAILY
COMMUNITY
Start: 2025-04-11

## 2025-06-02 NOTE — PROGRESS NOTES
[facility]  [facility]  [address] [address]  Dept: [phone]  Dept Fax: [phone]  Loc: [phone]     Subjective  Parker Polanco, 79 y.o. male Established patient presents today with:  Chief Complaint   Patient presents with    COPD     6 month follow up        History of Present Illness  The patient presents for back pain and elevated PSA levels.    He reports persistent back pain, which is exacerbated by bending over. He has undergone back surgery twice in consecutive years, with the most recent procedure involving the insertion of a metal brayan. Despite being advised to attend physical therapy, he has not followed through with this recommendation, stating that he does not perform the exercises at home.    His Prostate-Specific Antigen (PSA) levels have been closely monitored, with an upcoming appointment scheduled with his physician in the next few weeks. He reports that a PSA level of 12 or 13 is considered low for him. He has undergone 3 or 4 biopsies, all of which have yielded normal results. A second opinion was sought from a specialist in [facility], but no additional findings were reported.    PAST SURGICAL HISTORY:  Back surgery twice in consecutive years, most recent involving insertion of a metal brayan.    FAMILY HISTORY  His uncle  of a heart attack at the age of 74 or 75 and had smoked all his life.      Past Medical History:   Diagnosis Date    Arthritis     R/A    CAD (coronary artery disease)     cardiac stents x 3    Chronic back pain     COPD (chronic obstructive pulmonary disease) (HCC)     past smoker > 25 yr habit / quit 25 yrs ago    COPD (chronic obstructive pulmonary disease) (HCC)     Coronary artery disease     Hemorrhoids     Hyperlipidemia     past trx / cannot tolerate statins    Hypertension     meds > 10 yrs / denies TIA or stroke    Neuropathy

## 2025-06-03 ENCOUNTER — LAB (OUTPATIENT)
Dept: LAB | Facility: HOSPITAL | Age: 80
End: 2025-06-03
Payer: MEDICARE

## 2025-06-03 DIAGNOSIS — R82.89 ABNORMAL URINE CYTOLOGY: ICD-10-CM

## 2025-06-04 LAB
BACTERIA UR CULT: NORMAL
PSA SERPL-MCNC: 12.24 NG/ML

## 2025-06-23 ENCOUNTER — APPOINTMENT (OUTPATIENT)
Dept: UROLOGY | Facility: CLINIC | Age: 80
End: 2025-06-23
Payer: MEDICARE

## 2025-06-23 VITALS — BODY MASS INDEX: 28.49 KG/M2 | WEIGHT: 215 LBS | HEIGHT: 73 IN

## 2025-06-23 DIAGNOSIS — N40.0 BENIGN LOCALIZED HYPERPLASIA OF PROSTATE: ICD-10-CM

## 2025-06-23 DIAGNOSIS — R97.20 HIGH PROSTATE SPECIFIC ANTIGEN (PSA): Primary | ICD-10-CM

## 2025-06-23 DIAGNOSIS — R82.89 ABNORMAL URINE CYTOLOGY: ICD-10-CM

## 2025-06-23 PROCEDURE — 99213 OFFICE O/P EST LOW 20 MIN: CPT | Performed by: UROLOGY

## 2025-06-23 PROCEDURE — G2211 COMPLEX E/M VISIT ADD ON: HCPCS | Performed by: UROLOGY

## 2025-06-23 PROCEDURE — 1159F MED LIST DOCD IN RCRD: CPT | Performed by: UROLOGY

## 2025-06-23 PROCEDURE — 1160F RVW MEDS BY RX/DR IN RCRD: CPT | Performed by: UROLOGY

## 2025-06-23 PROCEDURE — 1036F TOBACCO NON-USER: CPT | Performed by: UROLOGY

## 2025-06-23 ASSESSMENT — ENCOUNTER SYMPTOMS
HEMATURIA: 0
DYSURIA: 0
FREQUENCY: 1
DIFFICULTY URINATING: 0

## 2025-06-23 NOTE — PROGRESS NOTES
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 No family history of prostate or breast cancer. 20-pack-year cigarette smoking history.     June 2000, PSA 3.7, TRUS biopsy (6) BPH     May of 2004, PSA 6.6, biopsy (8), BPH     August 2007, PSA 4.4, biopsy (10), BPH     01/12/12, PSA 9.1, biopsy (12) revealed ASAP     May 9 2012, biopsy (14) revealed BPH.     06/24/14 patient presented with significant ORCHALGIA.     12/05/14, PSA was 8.0, last PSA in his last biopsy was done at 9.1. Urine cytologies again ATYPICAL. Scrotal ultrasound is negative.     06/08/15, PSA 11.2. Patient states that he has had some recent discomfort which may be related to prostatitis or even epididymitis. 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 We will have a trial of Cipro for 2 weeks and repeat PSA.     10/19/15, patient had an outside PSA which was reportedly ELEVATED. We have repeated it and it is 7.7 in our laboratory. This is clearly within range of his 2012 biopsy which revealed BPH, when the PSA was 9.1. Of note, the patient is stating that he has URINARY FREQUENCY every hour, NOCTURIA Ã--3, and URGENCY. He states that these are new symptoms over the last 4-6 months. I have offered him a cystoscopy with urodynamic studies. He would like to review our brochures and discuss at our next appointment.     12/29/15, the patient states that his frequency and urgency have abated. He only has NOCTURIA Ã--1 at this point. He refuses evaluation with cystoscopy and urodynamics. PSA is 5.5 which is well within range of his last biopsy. Cytology is negative. Testicular ultrasound shows a right epididymal head complex cyst which is most probably benign. He will return in June 2016 for his annual visit.     06/24/16 patient without any complaints. PSA 7.0, within limits of his 01/12/12 biopsy when the PSA was 9.1. Cytology negative. Testicular ultrasound once  again shows a septated right EPIDIDYMAL HEAD CYST without change. He will return in 6 months with a PSA.     12/21/16, PSA 7.4 which is within range of his 01/12/12 biopsy when the PSA was 9.1. Once again, he is complaining of URGENCY AND FREQUENCY but is not interested in a cystoscopy with urodynamics he is also complaining of foul-smelling urine. We will obtain a urinalysis and urine culture and I will prescribe a 5 day course of Macrobid. When he returns in June, we will order a flow rate and PVR in addition to his PSA, cytology and testicular ultrasound.     06/06/17, PSA is 11.0 which is higher than the PSA of 9.1 when he had a biopsy on 01/12/12 which was benign. We will try 2 weeks course of antibiotics and retake the PSA. If it is above 10.1, we will recommend a TRUS biopsy (14). Patient no longer is complaining of urgency or frequency. Testicular ultrasound shows stable right COMPLEX EPIDIDYMAL CYST. Urine cytology is negative.     12/15/17, PSA is 25.6, which is higher than the PSA of 9.1 when he had his last biopsy on 01/12/12, which proved to be benign. 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 We will try Cipro for 2 weeks and repeat the PSA and make a decision at that point. Clearly if it is the same or higher we will proceed with another biopsy, TRUS biopsy (14). However if it decreases we will either continue the Cipro is a lower dose and repeat PSA or wait until June of next year.     01/07/18, following 2 weeks of Cipro, PSA decreased to 17.2     02/02/18 following 2 more weeks of Cipro, PSA down to 11.4.     06/06/18, patient is complaining of generalized FATIGUE AND WEAKNESS. His blood pressure is now at 100 for systolic which is quite low for him. He will see Dr. Laboy, his cardiologist tomorrow to reevaluate. In addition, his PSA once again is elevated to 26.2. 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 We will give him a trial of Cipro at the 500 mg dose for 2 weeks and repeat Cipro and repeat the PSA. If it is still elevated, I would recommend an MRI guided prostate biopsy with Dr. Arriaza. Testicular ultrasound shows no change and his urine cytology was negative.     07/25/18, patient saw Dr. Arriaza, MRI was ordered: Negative for abnormalities. Therefore patient will return to my care.     12/05/18, PSA is 22.5. His within range of his PSAs over the past year. His MRI was negative for months ago. We will have another PSA in 6 months and I will obtain an [employer] urine test a month prior to his visit. He is now complaining of some urgency, frequency and nocturia. We will discuss a cystoscopy with urodynamic studies at his next visit.     06/07/19, PSA is now 35.50. [employer] urine test is 28.86 which is a high risk for high grade prostate cancer. Cytology is negative. Culture is negative. He will be scheduled for a TRUS biopsy, he wishes to have this performed under anesthesia. This is been scheduled for 06/13/19 and Dr. Laboy will provide preoperative cardiac risk stratification.     06/13/19, OR, TRUS biopsy. PSA 35.50 volume 153.0 cc. Pathology: BPH.     06/19/19, telephone conversation, preoperative chest x-ray showed a nodule     07/08/19, telephone conversation, CAT scan of the chest of the right lung nodules are benign.     12/14/19, patient arrives alone. He states that he is now experiencing URINARY FREQUENCY, URGENCY, AND INCOMPLETE EMPTYING. He does have a 153.0 g prostate on ultrasound. We will pursue a cystoscopy with urodynamic studies. He may benefit from an alpha agent, with consideration to hypotension, a 5 alpha reductase inhibitor or both. His most recent PSA is 24.50 which is significantly less then his PSA of 35.50 on 06/13/19 where he underwent a negative biopsy.     10/26/20, cystoscopy today reveals a severely obstructed prostatic urethra and a severely elevated  median lobe. Multiple trabeculations and small diverticulum within the bladder. Flow rate of 3 cc/s with a total volume of 23 cc. PVR 15 cc's. Patient states that his urinary frequency in the daytime is the major issue urinating approximately every hour. His nocturia is vacillating between times one to Ã--5. I will initiate Flomax 0.4 mg daily. He will return in 3 months and we will consider adding Proscar if necessary.     02/03/21, patient arrives alone. He states that since starting Flomax, his urinary frequency has changed from every hour during the daytime to every 2-1/2 hours. Nocturia is now down from Ã--5 to Ã--1. He did not have much urine today, only 28 cc. Therefore his flow rate was 3 cc with a PVR of 80 cc. PSA is 13.5 which is dramatically lower then his PSA of 35.50 on 06/13/19 when he underwent a negative prostate biopsy. Urine cytologies lacking atypia. Testicular ultrasound shows stable bilateral epididymal cysts. We will no longer proceed with ultrasounds. He does state that he notices an odor to his urine and we will now start checking his UA and culture. If he starts having more urinary symptoms we can add a 5 alpha reductase inhibitor in the future.     June 4, 2021, patient arrives alone. He states that Flomax is working with decreased urgency and frequency and nocturia has decreased to x1. He states that this is associated with fluid intake and activity. Flow rate of 8 cc/s, total volume 41 cc and PVR 37 cc. PSA is 15.90 which is lower than his PSA of 35.50 on June 13, 2019 when he underwent a negative prostate biopsy. Urinalysis and urine culture were negative. Urine cytology was not performed due to laboratory indiscretion. He will return in 1 year.     August 22, 2021, OR, Dr. Brett Norton, lumbar surgery     December 21, 2021, patient calls complaining of hesitancy and incomplete emptying. Believes he has a UTI. Macrobid ordered. UA and CNS were negative.     June 8, 2022, patient  arrives alone. He states that his urinary urgency and frequency has returned. He states his nocturia x3 has returned. He continues on daily Flomax and I will now add Proscar to his regimen. He will return in 6 months. If he still has the same symptoms, we will have him see Dr. Jon George in consultation for minimally invasive prostate surgery. Today's flow rate 7 cc/s, total volume 71 cc, no PVR due to mechanical failure. PSA is 17.49 which is lower than his PSA of 35.50 on June 13, 2019 when he underwent a negative prostate biopsy. Urinalysis shows 1 red blood cell. Urine culture and urine cytology were ordered but not performed.     June 27, 2022, telephone call, patient complains of bladder pain and pressure. We obtained a urinalysis and urine culture which was negative.     August 3, 2022, telephone call, patient states symptoms have improved and he no longer has any issues. He continues to take Flomax and Proscar.     January 17, 2023, patient arrives alone. He has lost 30 pounds as a side effect of one of his medications from pain management. He is discontinued that medicine and has stabilized. Dr. Laboy his cardiologist is manipulating his antihypertensive medication and he does occasionally have dizziness. He has been taking Flomax for over 3 years without side effect. It is most probably secondary to his weight loss and other medications. Today, now that he is on both Flomax and Proscar, his parameters are excellent. Flow rate of 10 cc/s, total volume 105 cc, PVR 17 cc. Urinalysis is negative for blood. Renal colic CAT scan does not identify any stones in the urinary tract. He will return in 6 months     July 28, 2023, patient arrives alone. He continues on Flomax and Proscar with excellent results. Flow rate of 12 cc/s with a PVR of 7 cc. Urinalysis is negative for blood, urine culture no growth, urine cytology is lacking atypia. Corrected PSA is 13.28 which is significantly lower than his PSA of 35.50  on June 13, 2019 when he underwent a negative prostate biopsy. He will return in December.     August 8, 2023, patient admitted to [facility] for transient aphasia     December 2023, patient discontinued Proscar secondary to gynecomastia.     PLAVIX     February 2, 2024, patient arrives alone.  He continues on Flomax alone with a flow rate 10 cc/s, total volume 212 cc,  cc.  He is now developed CKD stage III.  Corrected PSA is 11.10 which is significantly lower than the PSA 35.50 on June 13, 2019 when he underwent a negative prostate biopsy.  He will return in June.     February 21, 2024, OR, Dr. Brett Norton, lumbar surgery     June 18, 2024, patient arrives alone.  He continues on Flomax alone.  PVR today is 0 cc.  On June 13, 2019, with a PSA of 35.50, he underwent a TRUS biopsy showing a volume of 153 cc.  Pathology was negative at that time.  PSA now is 13.40.  He has no new urologic complaints.  Urinalysis no blood.  Urine culture no growth.  Urine cytology was lacking atypia.  He will return in December.     December 14, 2024, patient arrives alone.  He continues on Flomax alone.  PVR today is 0 cc.  On June 13, 2019, with a PSA of 35.50, he underwent a TRUS biopsy showing a volume of 153 cc.  Pathology was negative at that time.  PSA now is 18.70.  He has no new urologic complaints.   He will return in June.    June 23, 2025, 10:28 AM.  Telehealth visit.  Audio only.  Patient did not wish to participate in video conferencing.  Patient was identified by his name and date of birth.  He stated that he was at home and that he was alone.  I was in my [place] office.  He has no new urologic complaints.  Urine cytology shows atypical urothelial cells in clusters.  No specifics regarding neoplastic potential.  PSA is 12.24.  On June 13, 2019, with a PSA of 35.50, he underwent a TRUS biopsy.  Pathology at that time was negative and the volume was 153 cc.  Urine culture shows no growth.  He will return in  December.  He will continue on daily Flomax.     PLAN:     #1 patient will return in December 2025 with a PVR and PSA     Also in June 2026 with a PSA, urine studies and PVR.      #2 continue Flomax 0.4 mg by mouth daily      Physical Exam  Vitals and nursing note reviewed.   Constitutional:       Appearance: Normal appearance.   HENT:      Head: Normocephalic and atraumatic.   Pulmonary:      Effort: Pulmonary effort is normal.   Abdominal:      Palpations: Abdomen is soft.      Tenderness: There is no abdominal tenderness.   Musculoskeletal:         General: Normal range of motion.      Cervical back: Normal range of motion and neck supple.   Neurological:      General: No focal deficit present.      Mental Status: He is alert and oriented to person, place, and time.   Psychiatric:         Mood and Affect: Mood normal.         Behavior: Behavior normal.        This note was created with voice-recognition software and was not corrected for typographical or grammatical errors.

## 2025-06-23 NOTE — PATIENT INSTRUCTIONS
Patient Discussion/Summary     It was good to see you once again.  You have been taking Flomax alone.  Your PSA, is stable at 12.24, which is significantly lower than your PSA in 2019 of 35.5 when you had a prostate biopsy which was negative.  We will see you again in 6 months      This note was created with voice-recognition software and was not corrected for typographical or grammatical errors

## 2025-06-23 NOTE — PROGRESS NOTES
Patient denies any pain today. Patient has not had any recent surgeries or hospital admits. Patient denies any concerns about falling or safety. Patient has occasional urgency, states he urinates every 2 hours and makes sure to know where restrooms are when he is in public. Patient taking flomax as directed. CV    Review of Systems   Genitourinary:  Positive for frequency and urgency. Negative for decreased urine volume, difficulty urinating, dysuria, enuresis, hematuria, penile pain, penile swelling, scrotal swelling and testicular pain.   All other systems reviewed and are negative.

## 2025-06-26 DIAGNOSIS — R97.20 HIGH PROSTATE SPECIFIC ANTIGEN (PSA): ICD-10-CM

## 2025-12-19 ENCOUNTER — APPOINTMENT (OUTPATIENT)
Dept: UROLOGY | Facility: CLINIC | Age: 80
End: 2025-12-19
Payer: MEDICARE

## 2026-01-27 ENCOUNTER — APPOINTMENT (OUTPATIENT)
Dept: CARDIOLOGY | Facility: CLINIC | Age: 81
End: 2026-01-27
Payer: MEDICARE

## (undated) DEVICE — TIP, SUCTION, FRAZIER, W/CONTROL VENT, 12 FR

## (undated) DEVICE — BANDAGE COMPR W4INXL5YD WHT BGE POLY COT M E WRP WV HK AND

## (undated) DEVICE — GLOVE, SURGICAL, PROTEXIS PI MICRO, 7.5, PF, LF

## (undated) DEVICE — SYRINGE IRRIG 60ML SFT PLIABLE BLB EZ TO GRP 1 HND USE W/

## (undated) DEVICE — SUTURE ETHLN SZ 5-0 L18IN NONABSORBABLE BLK L13MM P-3 3/8 698H

## (undated) DEVICE — LABEL MED MINI W/ MARKER

## (undated) DEVICE — APPLICATOR MEDICATED 26 CC SOLUTION HI LT ORNG CHLORAPREP

## (undated) DEVICE — PADDING CAST W4INXL4YD HIGHLY ABSRB THAN COT EZ APPL

## (undated) DEVICE — GLOVE, SURGICAL, PROTEXIS PI , 7.5, PF, LF

## (undated) DEVICE — 1010 S-DRAPE TOWEL DRAPE 10/BX: Brand: STERI-DRAPE™

## (undated) DEVICE — SMARTGOWN BREATHABLE SPECIALTY GOWN: Brand: CONVERTORS

## (undated) DEVICE — BUR, MR8 14CM 3MM DIA, MATCH HEAD, LOW PROFILE SP SYM-TRI

## (undated) DEVICE — NEEDLE, PEDIGUARD, CANNULATED P2

## (undated) DEVICE — GLOVE ORANGE PI 8   MSG9080

## (undated) DEVICE — SYRINGE BLB 50CC IRRIG PLIABLE FNGR FLNG GRAD FLSK DISP

## (undated) DEVICE — STANDARD HYPODERMIC NEEDLE,POLYPROPYLENE HUB: Brand: MONOJECT

## (undated) DEVICE — ZIMMER® STERILE DISPOSABLE TOURNIQUET CUFF, DUAL PORT, SINGLE BLADDER, 18 IN. (46 CM)

## (undated) DEVICE — DRESSING GZ W1XL8IN COT XRFRM N ADH OVERWRAP CURAD

## (undated) DEVICE — ALCON SURGICAL BLADE 64: Brand: ALCON

## (undated) DEVICE — BLADE, MILL BONE, MEDIUM, DISP

## (undated) DEVICE — TRAY, SURESTEP, SILICONE DRAINAGE BAG, STATLOCK, 16FR

## (undated) DEVICE — Device

## (undated) DEVICE — WAX, BONE, 2.5 GM

## (undated) DEVICE — SEALANT, HEMOSTATIC, FLOSEAL, 10 ML

## (undated) DEVICE — GOWN, SURGICAL, IMPLT, BACK, XLARGE, XLONG, STERILE

## (undated) DEVICE — CONVERTED USE 248063 TOWELS OR BL ST

## (undated) DEVICE — GLOVE, SURGICAL, PROTEXIS PI BLUE W/NEUTHERA, 8.0, PF, LF

## (undated) DEVICE — GOWN,SIRUS,NONRNF,SETINSLV,2XL,18/CS: Brand: MEDLINE

## (undated) DEVICE — NEEDLE HYPO 22GA L1 1/2IN PIVOTING SHLD FOR LUERLOCK SYR

## (undated) DEVICE — DRAPE, INCISE, ANTIMICROBIAL, IOBAN 2, STERI DRAPE, 23 X 33 IN, DISPOSABLE, STERILE

## (undated) DEVICE — COVER, TABLE, 54X90

## (undated) DEVICE — TOWEL PACK, STERILE, 4/PACK, BLUE

## (undated) DEVICE — SUPER SPONGES,MEDIUM: Brand: KERLIX

## (undated) DEVICE — HANDLE, PEDIGUARD, CANNULATED

## (undated) DEVICE — SINGLE PORT MANIFOLD: Brand: NEPTUNE 2

## (undated) DEVICE — DRESSING PETRO W3XL8IN N ADH KNIT CELOS ACETT ADPTC

## (undated) DEVICE — SOLUTION, IRRIGATION, STERILE WATER, 1000 ML, POUR BOTTLE

## (undated) DEVICE — SOLUTION, IRRIGATION, SODIUM CHLORIDE 0.9%, 1000 ML, POUR BOTTLE

## (undated) DEVICE — GLOVE ORANGE PI 7 1/2   MSG9075

## (undated) DEVICE — APPLICATOR, CHLORAPREP, W/ORANGE TINT, 26ML

## (undated) DEVICE — HAND II: Brand: MEDLINE INDUSTRIES, INC.

## (undated) DEVICE — CURITY STRETCH BANDAGE: Brand: CURITY

## (undated) DEVICE — RELINE MAS TAP/DILATOR COMBO

## (undated) DEVICE — GLOVE ORANGE PI 8 1/2   MSG9085

## (undated) DEVICE — BANDAGE ELASTIC COMPRSS REINF 2INX4.5YD

## (undated) DEVICE — GAUZE,SPONGE,FLUFF,6"X6.75",STRL,10/TRAY: Brand: MEDLINE

## (undated) DEVICE — SLING ORTH BCKL CLOSURE MED UNISX ARM W/ PD LARGER PCH

## (undated) DEVICE — TIP,  ELECTRODE COATED INSULATED, EXTENDED, LF

## (undated) DEVICE — PAD SHLDR BLUE WHITE FELT FOR USE W ARM SLNG PROCARE

## (undated) DEVICE — SUTURE, VICRYL, 2-0, 18 IN CP-2, UNDYED

## (undated) DEVICE — DRAPE, SHEET, XL

## (undated) DEVICE — CORD,CAUTERY,BIPOLAR,STERILE: Brand: MEDLINE

## (undated) DEVICE — CHLORAPREP 26ML ORANGE

## (undated) DEVICE — PADDING UNDERCAST W4INXL12FT RAYON POLY SYN NONADHESIVE

## (undated) DEVICE — DRAPE, MICROSCOPE, W/REMOVABLE LENS